# Patient Record
Sex: FEMALE | Race: WHITE | NOT HISPANIC OR LATINO | Employment: FULL TIME | URBAN - METROPOLITAN AREA
[De-identification: names, ages, dates, MRNs, and addresses within clinical notes are randomized per-mention and may not be internally consistent; named-entity substitution may affect disease eponyms.]

---

## 2023-01-05 ENCOUNTER — APPOINTMENT (OUTPATIENT)
Dept: GENERAL RADIOLOGY | Facility: HOSPITAL | Age: 63
End: 2023-01-05
Payer: COMMERCIAL

## 2023-01-05 ENCOUNTER — HOSPITAL ENCOUNTER (INPATIENT)
Facility: HOSPITAL | Age: 63
LOS: 9 days | Discharge: NURSING, REHAB OR LONG-TERM CARE | End: 2023-01-14
Attending: EMERGENCY MEDICINE | Admitting: INTERNAL MEDICINE
Payer: COMMERCIAL

## 2023-01-05 DIAGNOSIS — L03.115 CELLULITIS OF RIGHT FOOT: Primary | ICD-10-CM

## 2023-01-05 DIAGNOSIS — Z86.39 HISTORY OF DIABETES MELLITUS, TYPE II: ICD-10-CM

## 2023-01-05 PROBLEM — E11.9 TYPE 2 DIABETES MELLITUS: Status: ACTIVE | Noted: 2023-01-05

## 2023-01-05 PROBLEM — L97.519 ULCER OF RIGHT FOOT: Status: ACTIVE | Noted: 2023-01-05

## 2023-01-05 PROBLEM — K21.9 GERD (GASTROESOPHAGEAL REFLUX DISEASE): Status: ACTIVE | Noted: 2023-01-05

## 2023-01-05 PROBLEM — L40.50 PSORIATIC ARTHRITIS: Status: ACTIVE | Noted: 2023-01-05

## 2023-01-05 PROBLEM — D84.9 IMMUNOCOMPROMISED: Status: ACTIVE | Noted: 2023-01-05

## 2023-01-05 PROBLEM — A41.9 SEPSIS: Status: ACTIVE | Noted: 2023-01-05

## 2023-01-05 LAB
ALBUMIN SERPL-MCNC: 4.3 G/DL (ref 3.5–5.2)
ALBUMIN/GLOB SERPL: 1.1 G/DL
ALP SERPL-CCNC: 113 U/L (ref 39–117)
ALT SERPL W P-5'-P-CCNC: 44 U/L (ref 1–33)
ANION GAP SERPL CALCULATED.3IONS-SCNC: 13 MMOL/L (ref 5–15)
AST SERPL-CCNC: 25 U/L (ref 1–32)
BASOPHILS # BLD AUTO: 0.08 10*3/MM3 (ref 0–0.2)
BASOPHILS NFR BLD AUTO: 0.4 % (ref 0–1.5)
BILIRUB SERPL-MCNC: 1.2 MG/DL (ref 0–1.2)
BUN SERPL-MCNC: 16 MG/DL (ref 8–23)
BUN/CREAT SERPL: 17 (ref 7–25)
CALCIUM SPEC-SCNC: 9.7 MG/DL (ref 8.6–10.5)
CHLORIDE SERPL-SCNC: 95 MMOL/L (ref 98–107)
CO2 SERPL-SCNC: 27 MMOL/L (ref 22–29)
CREAT SERPL-MCNC: 0.94 MG/DL (ref 0.57–1)
CRP SERPL-MCNC: 12.67 MG/DL (ref 0–0.5)
D-LACTATE SERPL-SCNC: 1.9 MMOL/L (ref 0.5–2)
DEPRECATED RDW RBC AUTO: 46.4 FL (ref 37–54)
EGFRCR SERPLBLD CKD-EPI 2021: 69.2 ML/MIN/1.73
EOSINOPHIL # BLD AUTO: 0.01 10*3/MM3 (ref 0–0.4)
EOSINOPHIL NFR BLD AUTO: 0 % (ref 0.3–6.2)
ERYTHROCYTE [DISTWIDTH] IN BLOOD BY AUTOMATED COUNT: 13.6 % (ref 12.3–15.4)
ERYTHROCYTE [SEDIMENTATION RATE] IN BLOOD: 74 MM/HR (ref 0–30)
GLOBULIN UR ELPH-MCNC: 4 GM/DL
GLUCOSE SERPL-MCNC: 364 MG/DL (ref 65–99)
HCT VFR BLD AUTO: 39.5 % (ref 34–46.6)
HGB BLD-MCNC: 13.1 G/DL (ref 12–15.9)
HOLD SPECIMEN: NORMAL
IMM GRANULOCYTES # BLD AUTO: 0.08 10*3/MM3 (ref 0–0.05)
IMM GRANULOCYTES NFR BLD AUTO: 0.4 % (ref 0–0.5)
LYMPHOCYTES # BLD AUTO: 0.9 10*3/MM3 (ref 0.7–3.1)
LYMPHOCYTES NFR BLD AUTO: 4.5 % (ref 19.6–45.3)
MCH RBC QN AUTO: 31.3 PG (ref 26.6–33)
MCHC RBC AUTO-ENTMCNC: 33.2 G/DL (ref 31.5–35.7)
MCV RBC AUTO: 94.5 FL (ref 79–97)
MONOCYTES # BLD AUTO: 0.97 10*3/MM3 (ref 0.1–0.9)
MONOCYTES NFR BLD AUTO: 4.8 % (ref 5–12)
NEUTROPHILS NFR BLD AUTO: 17.97 10*3/MM3 (ref 1.7–7)
NEUTROPHILS NFR BLD AUTO: 89.9 % (ref 42.7–76)
NRBC BLD AUTO-RTO: 0 /100 WBC (ref 0–0.2)
PLATELET # BLD AUTO: 367 10*3/MM3 (ref 140–450)
PMV BLD AUTO: 9.4 FL (ref 6–12)
POTASSIUM SERPL-SCNC: 3.6 MMOL/L (ref 3.5–5.2)
PROCALCITONIN SERPL-MCNC: 0.35 NG/ML (ref 0–0.25)
PROT SERPL-MCNC: 8.3 G/DL (ref 6–8.5)
RBC # BLD AUTO: 4.18 10*6/MM3 (ref 3.77–5.28)
SODIUM SERPL-SCNC: 135 MMOL/L (ref 136–145)
WBC NRBC COR # BLD: 20.01 10*3/MM3 (ref 3.4–10.8)
WHOLE BLOOD HOLD COAG: NORMAL
WHOLE BLOOD HOLD SPECIMEN: NORMAL

## 2023-01-05 PROCEDURE — 86140 C-REACTIVE PROTEIN: CPT | Performed by: INTERNAL MEDICINE

## 2023-01-05 PROCEDURE — 87205 SMEAR GRAM STAIN: CPT | Performed by: NURSE PRACTITIONER

## 2023-01-05 PROCEDURE — 80053 COMPREHEN METABOLIC PANEL: CPT

## 2023-01-05 PROCEDURE — 99285 EMERGENCY DEPT VISIT HI MDM: CPT

## 2023-01-05 PROCEDURE — 83605 ASSAY OF LACTIC ACID: CPT

## 2023-01-05 PROCEDURE — 85025 COMPLETE CBC W/AUTO DIFF WBC: CPT

## 2023-01-05 PROCEDURE — 99222 1ST HOSP IP/OBS MODERATE 55: CPT | Performed by: INTERNAL MEDICINE

## 2023-01-05 PROCEDURE — 87070 CULTURE OTHR SPECIMN AEROBIC: CPT | Performed by: NURSE PRACTITIONER

## 2023-01-05 PROCEDURE — 87040 BLOOD CULTURE FOR BACTERIA: CPT | Performed by: EMERGENCY MEDICINE

## 2023-01-05 PROCEDURE — 73630 X-RAY EXAM OF FOOT: CPT

## 2023-01-05 PROCEDURE — 36415 COLL VENOUS BLD VENIPUNCTURE: CPT

## 2023-01-05 PROCEDURE — 87040 BLOOD CULTURE FOR BACTERIA: CPT

## 2023-01-05 PROCEDURE — 85652 RBC SED RATE AUTOMATED: CPT | Performed by: INTERNAL MEDICINE

## 2023-01-05 PROCEDURE — 25010000002 MEROPENEM PER 100 MG: Performed by: NURSE PRACTITIONER

## 2023-01-05 PROCEDURE — 84145 PROCALCITONIN (PCT): CPT | Performed by: INTERNAL MEDICINE

## 2023-01-05 RX ORDER — ASPIRIN 81 MG/1
81 TABLET, CHEWABLE ORAL DAILY
COMMUNITY

## 2023-01-05 RX ORDER — BETAMETHASONE DIPROPIONATE 0.5 MG/G
1 CREAM TOPICAL 2 TIMES DAILY PRN
COMMUNITY

## 2023-01-05 RX ORDER — SELENIUM SULFIDE 2.5 MG/100ML
LOTION TOPICAL DAILY PRN
COMMUNITY

## 2023-01-05 RX ORDER — FOLIC ACID 1 MG/1
0.5 TABLET ORAL DAILY
COMMUNITY

## 2023-01-05 RX ORDER — ZINC 25 MG
TABLET ORAL
COMMUNITY

## 2023-01-05 RX ORDER — DEXLANSOPRAZOLE 60 MG/1
60 CAPSULE, DELAYED RELEASE ORAL DAILY
COMMUNITY

## 2023-01-05 RX ORDER — PHENOL 1.4 %
600 AEROSOL, SPRAY (ML) MUCOUS MEMBRANE DAILY
COMMUNITY

## 2023-01-05 RX ORDER — POTASSIUM CITRATE 10 MEQ/1
10 TABLET, EXTENDED RELEASE ORAL
COMMUNITY

## 2023-01-05 RX ORDER — NYSTATIN 100000 [USP'U]/G
POWDER TOPICAL 4 TIMES DAILY
COMMUNITY

## 2023-01-05 RX ORDER — GLUCOSAMINE/D3/BOSWELLIA SERRA 1500MG-400
TABLET ORAL
COMMUNITY

## 2023-01-05 RX ORDER — SODIUM CHLORIDE 0.9 % (FLUSH) 0.9 %
10 SYRINGE (ML) INJECTION AS NEEDED
Status: DISCONTINUED | OUTPATIENT
Start: 2023-01-05 | End: 2023-01-06

## 2023-01-05 RX ORDER — ATORVASTATIN CALCIUM 10 MG/1
10 TABLET, FILM COATED ORAL DAILY
COMMUNITY

## 2023-01-05 RX ORDER — LORATADINE 10 MG/1
CAPSULE, LIQUID FILLED ORAL
COMMUNITY

## 2023-01-05 RX ORDER — HYDROCHLOROTHIAZIDE 25 MG/1
50 TABLET ORAL DAILY
COMMUNITY

## 2023-01-05 RX ORDER — MULTIVIT WITH MINERALS/LUTEIN
500 TABLET ORAL DAILY
COMMUNITY

## 2023-01-05 RX ORDER — ERGOCALCIFEROL 1.25 MG/1
50000 CAPSULE ORAL WEEKLY
COMMUNITY

## 2023-01-05 RX ORDER — VIT C/B6/B5/MAGNESIUM/HERB 173 50-5-6-5MG
1000 CAPSULE ORAL
COMMUNITY

## 2023-01-05 RX ADMIN — SODIUM CHLORIDE 1000 ML: 9 INJECTION, SOLUTION INTRAVENOUS at 21:55

## 2023-01-05 RX ADMIN — MEROPENEM 1 G: 1 INJECTION, POWDER, FOR SOLUTION INTRAVENOUS at 21:53

## 2023-01-06 ENCOUNTER — APPOINTMENT (OUTPATIENT)
Dept: MRI IMAGING | Facility: HOSPITAL | Age: 63
End: 2023-01-06
Payer: COMMERCIAL

## 2023-01-06 ENCOUNTER — ANESTHESIA EVENT (OUTPATIENT)
Dept: PERIOP | Facility: HOSPITAL | Age: 63
End: 2023-01-06
Payer: COMMERCIAL

## 2023-01-06 ENCOUNTER — ANESTHESIA (OUTPATIENT)
Dept: PERIOP | Facility: HOSPITAL | Age: 63
End: 2023-01-06
Payer: COMMERCIAL

## 2023-01-06 PROBLEM — L03.115 CELLULITIS OF RIGHT FOOT: Status: ACTIVE | Noted: 2023-01-05

## 2023-01-06 LAB
ABO GROUP BLD: NORMAL
ABO GROUP BLD: NORMAL
ANION GAP SERPL CALCULATED.3IONS-SCNC: 11 MMOL/L (ref 5–15)
BASOPHILS # BLD AUTO: 0.07 10*3/MM3 (ref 0–0.2)
BASOPHILS NFR BLD AUTO: 0.4 % (ref 0–1.5)
BLD GP AB SCN SERPL QL: NEGATIVE
BUN SERPL-MCNC: 14 MG/DL (ref 8–23)
BUN/CREAT SERPL: 18.2 (ref 7–25)
CALCIUM SPEC-SCNC: 9.3 MG/DL (ref 8.6–10.5)
CHLORIDE SERPL-SCNC: 99 MMOL/L (ref 98–107)
CK SERPL-CCNC: 99 U/L (ref 20–180)
CO2 SERPL-SCNC: 27 MMOL/L (ref 22–29)
CREAT SERPL-MCNC: 0.77 MG/DL (ref 0.57–1)
DEPRECATED RDW RBC AUTO: 46.1 FL (ref 37–54)
EGFRCR SERPLBLD CKD-EPI 2021: 87.9 ML/MIN/1.73
EOSINOPHIL # BLD AUTO: 0.03 10*3/MM3 (ref 0–0.4)
EOSINOPHIL NFR BLD AUTO: 0.2 % (ref 0.3–6.2)
ERYTHROCYTE [DISTWIDTH] IN BLOOD BY AUTOMATED COUNT: 13.5 % (ref 12.3–15.4)
GLUCOSE BLDC GLUCOMTR-MCNC: 150 MG/DL (ref 70–130)
GLUCOSE BLDC GLUCOMTR-MCNC: 152 MG/DL (ref 70–130)
GLUCOSE BLDC GLUCOMTR-MCNC: 162 MG/DL (ref 70–130)
GLUCOSE BLDC GLUCOMTR-MCNC: 187 MG/DL (ref 70–130)
GLUCOSE BLDC GLUCOMTR-MCNC: 278 MG/DL (ref 70–130)
GLUCOSE SERPL-MCNC: 189 MG/DL (ref 65–99)
HBA1C MFR BLD: 8.2 % (ref 4.8–5.6)
HCT VFR BLD AUTO: 34.2 % (ref 34–46.6)
HGB BLD-MCNC: 11.5 G/DL (ref 12–15.9)
IMM GRANULOCYTES # BLD AUTO: 0.12 10*3/MM3 (ref 0–0.05)
IMM GRANULOCYTES NFR BLD AUTO: 0.7 % (ref 0–0.5)
LYMPHOCYTES # BLD AUTO: 1.05 10*3/MM3 (ref 0.7–3.1)
LYMPHOCYTES NFR BLD AUTO: 6.5 % (ref 19.6–45.3)
MAGNESIUM SERPL-MCNC: 1.9 MG/DL (ref 1.6–2.4)
MCH RBC QN AUTO: 31.6 PG (ref 26.6–33)
MCHC RBC AUTO-ENTMCNC: 33.6 G/DL (ref 31.5–35.7)
MCV RBC AUTO: 94 FL (ref 79–97)
MONOCYTES # BLD AUTO: 1.2 10*3/MM3 (ref 0.1–0.9)
MONOCYTES NFR BLD AUTO: 7.4 % (ref 5–12)
MRSA DNA SPEC QL NAA+PROBE: POSITIVE
NEUTROPHILS NFR BLD AUTO: 13.72 10*3/MM3 (ref 1.7–7)
NEUTROPHILS NFR BLD AUTO: 84.8 % (ref 42.7–76)
NRBC BLD AUTO-RTO: 0 /100 WBC (ref 0–0.2)
PLATELET # BLD AUTO: 287 10*3/MM3 (ref 140–450)
PMV BLD AUTO: 9.6 FL (ref 6–12)
POTASSIUM SERPL-SCNC: 3.1 MMOL/L (ref 3.5–5.2)
RBC # BLD AUTO: 3.64 10*6/MM3 (ref 3.77–5.28)
RH BLD: POSITIVE
RH BLD: POSITIVE
SODIUM SERPL-SCNC: 137 MMOL/L (ref 136–145)
T&S EXPIRATION DATE: NORMAL
WBC NRBC COR # BLD: 16.19 10*3/MM3 (ref 3.4–10.8)

## 2023-01-06 PROCEDURE — 0 GADOBENATE DIMEGLUMINE 529 MG/ML SOLUTION: Performed by: INTERNAL MEDICINE

## 2023-01-06 PROCEDURE — 87176 TISSUE HOMOGENIZATION CULTR: CPT | Performed by: THORACIC SURGERY (CARDIOTHORACIC VASCULAR SURGERY)

## 2023-01-06 PROCEDURE — 25010000002 DAPTOMYCIN PER 1 MG: Performed by: INTERNAL MEDICINE

## 2023-01-06 PROCEDURE — 83735 ASSAY OF MAGNESIUM: CPT

## 2023-01-06 PROCEDURE — 80048 BASIC METABOLIC PNL TOTAL CA: CPT | Performed by: NURSE PRACTITIONER

## 2023-01-06 PROCEDURE — 87075 CULTR BACTERIA EXCEPT BLOOD: CPT | Performed by: THORACIC SURGERY (CARDIOTHORACIC VASCULAR SURGERY)

## 2023-01-06 PROCEDURE — 99222 1ST HOSP IP/OBS MODERATE 55: CPT | Performed by: THORACIC SURGERY (CARDIOTHORACIC VASCULAR SURGERY)

## 2023-01-06 PROCEDURE — 86901 BLOOD TYPING SEROLOGIC RH(D): CPT | Performed by: PHYSICIAN ASSISTANT

## 2023-01-06 PROCEDURE — 25010000002 PIPERACILLIN SOD-TAZOBACTAM PER 1 G: Performed by: INTERNAL MEDICINE

## 2023-01-06 PROCEDURE — 86901 BLOOD TYPING SEROLOGIC RH(D): CPT

## 2023-01-06 PROCEDURE — 0JBQ0ZZ EXCISION OF RIGHT FOOT SUBCUTANEOUS TISSUE AND FASCIA, OPEN APPROACH: ICD-10-PCS | Performed by: THORACIC SURGERY (CARDIOTHORACIC VASCULAR SURGERY)

## 2023-01-06 PROCEDURE — 87641 MR-STAPH DNA AMP PROBE: CPT

## 2023-01-06 PROCEDURE — 82550 ASSAY OF CK (CPK): CPT | Performed by: INTERNAL MEDICINE

## 2023-01-06 PROCEDURE — 86900 BLOOD TYPING SEROLOGIC ABO: CPT | Performed by: PHYSICIAN ASSISTANT

## 2023-01-06 PROCEDURE — A9577 INJ MULTIHANCE: HCPCS | Performed by: INTERNAL MEDICINE

## 2023-01-06 PROCEDURE — 88305 TISSUE EXAM BY PATHOLOGIST: CPT | Performed by: THORACIC SURGERY (CARDIOTHORACIC VASCULAR SURGERY)

## 2023-01-06 PROCEDURE — 28810 AMPUTATION TOE & METATARSAL: CPT | Performed by: PHYSICIAN ASSISTANT

## 2023-01-06 PROCEDURE — 82962 GLUCOSE BLOOD TEST: CPT

## 2023-01-06 PROCEDURE — 94799 UNLISTED PULMONARY SVC/PX: CPT

## 2023-01-06 PROCEDURE — 88311 DECALCIFY TISSUE: CPT | Performed by: THORACIC SURGERY (CARDIOTHORACIC VASCULAR SURGERY)

## 2023-01-06 PROCEDURE — 86900 BLOOD TYPING SEROLOGIC ABO: CPT

## 2023-01-06 PROCEDURE — 87205 SMEAR GRAM STAIN: CPT | Performed by: THORACIC SURGERY (CARDIOTHORACIC VASCULAR SURGERY)

## 2023-01-06 PROCEDURE — 63710000001 INSULIN LISPRO (HUMAN) PER 5 UNITS

## 2023-01-06 PROCEDURE — 85025 COMPLETE CBC W/AUTO DIFF WBC: CPT | Performed by: NURSE PRACTITIONER

## 2023-01-06 PROCEDURE — 86923 COMPATIBILITY TEST ELECTRIC: CPT

## 2023-01-06 PROCEDURE — 25010000002 ONDANSETRON PER 1 MG: Performed by: NURSE ANESTHETIST, CERTIFIED REGISTERED

## 2023-01-06 PROCEDURE — 83036 HEMOGLOBIN GLYCOSYLATED A1C: CPT | Performed by: NURSE PRACTITIONER

## 2023-01-06 PROCEDURE — 86850 RBC ANTIBODY SCREEN: CPT | Performed by: PHYSICIAN ASSISTANT

## 2023-01-06 PROCEDURE — 25010000002 MEROPENEM PER 100 MG: Performed by: NURSE PRACTITIONER

## 2023-01-06 PROCEDURE — 87070 CULTURE OTHR SPECIMN AEROBIC: CPT | Performed by: THORACIC SURGERY (CARDIOTHORACIC VASCULAR SURGERY)

## 2023-01-06 PROCEDURE — 87186 SC STD MICRODIL/AGAR DIL: CPT | Performed by: THORACIC SURGERY (CARDIOTHORACIC VASCULAR SURGERY)

## 2023-01-06 PROCEDURE — 0Y6M0ZB DETACHMENT AT RIGHT FOOT, PARTIAL 2ND RAY, OPEN APPROACH: ICD-10-PCS | Performed by: THORACIC SURGERY (CARDIOTHORACIC VASCULAR SURGERY)

## 2023-01-06 PROCEDURE — 99233 SBSQ HOSP IP/OBS HIGH 50: CPT | Performed by: INTERNAL MEDICINE

## 2023-01-06 PROCEDURE — 25010000002 PROPOFOL 10 MG/ML EMULSION: Performed by: NURSE ANESTHETIST, CERTIFIED REGISTERED

## 2023-01-06 PROCEDURE — 88304 TISSUE EXAM BY PATHOLOGIST: CPT | Performed by: THORACIC SURGERY (CARDIOTHORACIC VASCULAR SURGERY)

## 2023-01-06 PROCEDURE — 63710000001 INSULIN LISPRO (HUMAN) PER 5 UNITS: Performed by: NURSE PRACTITIONER

## 2023-01-06 PROCEDURE — 25010000002 VANCOMYCIN 10 G RECONSTITUTED SOLUTION: Performed by: NURSE PRACTITIONER

## 2023-01-06 PROCEDURE — 25010000002 FENTANYL CITRATE (PF) 100 MCG/2ML SOLUTION: Performed by: NURSE ANESTHETIST, CERTIFIED REGISTERED

## 2023-01-06 PROCEDURE — 73720 MRI LWR EXTREMITY W/O&W/DYE: CPT

## 2023-01-06 PROCEDURE — 94660 CPAP INITIATION&MGMT: CPT

## 2023-01-06 PROCEDURE — 28810 AMPUTATION TOE & METATARSAL: CPT | Performed by: THORACIC SURGERY (CARDIOTHORACIC VASCULAR SURGERY)

## 2023-01-06 RX ORDER — SODIUM CHLORIDE 0.9 % (FLUSH) 0.9 %
10 SYRINGE (ML) INJECTION EVERY 12 HOURS SCHEDULED
Status: DISCONTINUED | OUTPATIENT
Start: 2023-01-06 | End: 2023-01-14 | Stop reason: HOSPADM

## 2023-01-06 RX ORDER — NALOXONE HCL 0.4 MG/ML
0.4 VIAL (ML) INJECTION
Status: DISCONTINUED | OUTPATIENT
Start: 2023-01-06 | End: 2023-01-14 | Stop reason: HOSPADM

## 2023-01-06 RX ORDER — SODIUM CHLORIDE 0.9 % (FLUSH) 0.9 %
10 SYRINGE (ML) INJECTION AS NEEDED
Status: DISCONTINUED | OUTPATIENT
Start: 2023-01-06 | End: 2023-01-14 | Stop reason: HOSPADM

## 2023-01-06 RX ORDER — LIDOCAINE HYDROCHLORIDE 10 MG/ML
INJECTION, SOLUTION EPIDURAL; INFILTRATION; INTRACAUDAL; PERINEURAL AS NEEDED
Status: DISCONTINUED | OUTPATIENT
Start: 2023-01-06 | End: 2023-01-06 | Stop reason: SURG

## 2023-01-06 RX ORDER — FENTANYL CITRATE 50 UG/ML
50 INJECTION, SOLUTION INTRAMUSCULAR; INTRAVENOUS
Status: DISCONTINUED | OUTPATIENT
Start: 2023-01-06 | End: 2023-01-06 | Stop reason: HOSPADM

## 2023-01-06 RX ORDER — SODIUM CHLORIDE, SODIUM LACTATE, POTASSIUM CHLORIDE, CALCIUM CHLORIDE 600; 310; 30; 20 MG/100ML; MG/100ML; MG/100ML; MG/100ML
9 INJECTION, SOLUTION INTRAVENOUS CONTINUOUS
Status: DISCONTINUED | OUTPATIENT
Start: 2023-01-06 | End: 2023-01-09

## 2023-01-06 RX ORDER — PROPOFOL 10 MG/ML
VIAL (ML) INTRAVENOUS AS NEEDED
Status: DISCONTINUED | OUTPATIENT
Start: 2023-01-06 | End: 2023-01-06 | Stop reason: SURG

## 2023-01-06 RX ORDER — POTASSIUM CHLORIDE 1.5 G/1.77G
40 POWDER, FOR SOLUTION ORAL AS NEEDED
Status: DISCONTINUED | OUTPATIENT
Start: 2023-01-06 | End: 2023-01-14 | Stop reason: HOSPADM

## 2023-01-06 RX ORDER — FOLIC ACID 1 MG/1
0.5 TABLET ORAL DAILY
Status: DISCONTINUED | OUTPATIENT
Start: 2023-01-06 | End: 2023-01-14 | Stop reason: HOSPADM

## 2023-01-06 RX ORDER — DEXTROSE MONOHYDRATE 25 G/50ML
25 INJECTION, SOLUTION INTRAVENOUS
Status: DISCONTINUED | OUTPATIENT
Start: 2023-01-06 | End: 2023-01-14 | Stop reason: HOSPADM

## 2023-01-06 RX ORDER — ACETAMINOPHEN 325 MG/1
650 TABLET ORAL EVERY 4 HOURS PRN
Status: DISCONTINUED | OUTPATIENT
Start: 2023-01-06 | End: 2023-01-14 | Stop reason: HOSPADM

## 2023-01-06 RX ORDER — MORPHINE SULFATE 2 MG/ML
1 INJECTION, SOLUTION INTRAMUSCULAR; INTRAVENOUS EVERY 4 HOURS PRN
Status: ACTIVE | OUTPATIENT
Start: 2023-01-06 | End: 2023-01-13

## 2023-01-06 RX ORDER — FAMOTIDINE 20 MG/1
20 TABLET, FILM COATED ORAL ONCE
Status: COMPLETED | OUTPATIENT
Start: 2023-01-06 | End: 2023-01-06

## 2023-01-06 RX ORDER — NICOTINE POLACRILEX 4 MG
15 LOZENGE BUCCAL
Status: DISCONTINUED | OUTPATIENT
Start: 2023-01-06 | End: 2023-01-14 | Stop reason: HOSPADM

## 2023-01-06 RX ORDER — POTASSIUM CHLORIDE 7.45 MG/ML
10 INJECTION INTRAVENOUS
Status: DISCONTINUED | OUTPATIENT
Start: 2023-01-06 | End: 2023-01-14 | Stop reason: HOSPADM

## 2023-01-06 RX ORDER — PANTOPRAZOLE SODIUM 40 MG/1
40 TABLET, DELAYED RELEASE ORAL
Status: DISCONTINUED | OUTPATIENT
Start: 2023-01-07 | End: 2023-01-14 | Stop reason: HOSPADM

## 2023-01-06 RX ORDER — NYSTATIN 100000 [USP'U]/G
POWDER TOPICAL EVERY 8 HOURS SCHEDULED
Status: DISCONTINUED | OUTPATIENT
Start: 2023-01-06 | End: 2023-01-14 | Stop reason: HOSPADM

## 2023-01-06 RX ORDER — FENTANYL CITRATE 50 UG/ML
INJECTION, SOLUTION INTRAMUSCULAR; INTRAVENOUS AS NEEDED
Status: DISCONTINUED | OUTPATIENT
Start: 2023-01-06 | End: 2023-01-06 | Stop reason: SURG

## 2023-01-06 RX ORDER — INSULIN LISPRO 100 [IU]/ML
0-7 INJECTION, SOLUTION INTRAVENOUS; SUBCUTANEOUS
Status: DISCONTINUED | OUTPATIENT
Start: 2023-01-06 | End: 2023-01-07

## 2023-01-06 RX ORDER — HYDROCODONE BITARTRATE AND ACETAMINOPHEN 7.5; 325 MG/1; MG/1
1 TABLET ORAL EVERY 4 HOURS PRN
Status: ACTIVE | OUTPATIENT
Start: 2023-01-06 | End: 2023-01-13

## 2023-01-06 RX ORDER — SODIUM CHLORIDE 9 MG/ML
40 INJECTION, SOLUTION INTRAVENOUS AS NEEDED
Status: DISCONTINUED | OUTPATIENT
Start: 2023-01-06 | End: 2023-01-14 | Stop reason: HOSPADM

## 2023-01-06 RX ORDER — ACETAMINOPHEN 160 MG/5ML
650 SOLUTION ORAL EVERY 4 HOURS PRN
Status: DISCONTINUED | OUTPATIENT
Start: 2023-01-06 | End: 2023-01-14 | Stop reason: HOSPADM

## 2023-01-06 RX ORDER — HYDROCHLOROTHIAZIDE 25 MG/1
50 TABLET ORAL DAILY
Status: DISCONTINUED | OUTPATIENT
Start: 2023-01-06 | End: 2023-01-14 | Stop reason: HOSPADM

## 2023-01-06 RX ORDER — ONDANSETRON 2 MG/ML
INJECTION INTRAMUSCULAR; INTRAVENOUS AS NEEDED
Status: DISCONTINUED | OUTPATIENT
Start: 2023-01-06 | End: 2023-01-06 | Stop reason: SURG

## 2023-01-06 RX ORDER — HEPARIN SODIUM 5000 [USP'U]/ML
5000 INJECTION, SOLUTION INTRAVENOUS; SUBCUTANEOUS EVERY 12 HOURS SCHEDULED
Status: DISCONTINUED | OUTPATIENT
Start: 2023-01-07 | End: 2023-01-14 | Stop reason: HOSPADM

## 2023-01-06 RX ORDER — GINGER ROOT/GINGER ROOT EXT 262.5 MG
1 CAPSULE ORAL DAILY
Status: DISCONTINUED | OUTPATIENT
Start: 2023-01-06 | End: 2023-01-14 | Stop reason: HOSPADM

## 2023-01-06 RX ORDER — ACETAMINOPHEN 650 MG/1
650 SUPPOSITORY RECTAL EVERY 4 HOURS PRN
Status: DISCONTINUED | OUTPATIENT
Start: 2023-01-06 | End: 2023-01-14 | Stop reason: HOSPADM

## 2023-01-06 RX ORDER — HYDROMORPHONE HYDROCHLORIDE 1 MG/ML
0.5 INJECTION, SOLUTION INTRAMUSCULAR; INTRAVENOUS; SUBCUTANEOUS
Status: DISCONTINUED | OUTPATIENT
Start: 2023-01-06 | End: 2023-01-06 | Stop reason: HOSPADM

## 2023-01-06 RX ORDER — PANTOPRAZOLE SODIUM 40 MG/1
40 TABLET, DELAYED RELEASE ORAL EVERY MORNING
Status: DISCONTINUED | OUTPATIENT
Start: 2023-01-06 | End: 2023-01-06

## 2023-01-06 RX ORDER — SODIUM CHLORIDE 450 MG/100ML
50 INJECTION, SOLUTION INTRAVENOUS CONTINUOUS
Status: DISCONTINUED | OUTPATIENT
Start: 2023-01-06 | End: 2023-01-12

## 2023-01-06 RX ORDER — ASPIRIN 81 MG/1
81 TABLET, CHEWABLE ORAL DAILY
Status: DISCONTINUED | OUTPATIENT
Start: 2023-01-06 | End: 2023-01-14 | Stop reason: HOSPADM

## 2023-01-06 RX ORDER — ONDANSETRON 2 MG/ML
4 INJECTION INTRAMUSCULAR; INTRAVENOUS EVERY 6 HOURS PRN
Status: DISCONTINUED | OUTPATIENT
Start: 2023-01-06 | End: 2023-01-14 | Stop reason: HOSPADM

## 2023-01-06 RX ORDER — ACETAMINOPHEN 325 MG/1
650 TABLET ORAL EVERY 4 HOURS PRN
Status: DISCONTINUED | OUTPATIENT
Start: 2023-01-06 | End: 2023-01-06

## 2023-01-06 RX ORDER — SODIUM CHLORIDE, SODIUM LACTATE, POTASSIUM CHLORIDE, CALCIUM CHLORIDE 600; 310; 30; 20 MG/100ML; MG/100ML; MG/100ML; MG/100ML
INJECTION, SOLUTION INTRAVENOUS CONTINUOUS PRN
Status: DISCONTINUED | OUTPATIENT
Start: 2023-01-06 | End: 2023-01-06 | Stop reason: SURG

## 2023-01-06 RX ORDER — BETAMETHASONE DIPROPIONATE 0.5 MG/G
1 CREAM TOPICAL 2 TIMES DAILY
Status: DISCONTINUED | OUTPATIENT
Start: 2023-01-06 | End: 2023-01-10

## 2023-01-06 RX ORDER — POTASSIUM CHLORIDE 750 MG/1
40 CAPSULE, EXTENDED RELEASE ORAL AS NEEDED
Status: DISCONTINUED | OUTPATIENT
Start: 2023-01-06 | End: 2023-01-14 | Stop reason: HOSPADM

## 2023-01-06 RX ORDER — PANTOPRAZOLE SODIUM 40 MG/1
40 TABLET, DELAYED RELEASE ORAL DAILY
Status: DISCONTINUED | OUTPATIENT
Start: 2023-01-06 | End: 2023-01-06

## 2023-01-06 RX ORDER — ATORVASTATIN CALCIUM 10 MG/1
10 TABLET, FILM COATED ORAL DAILY
Status: DISCONTINUED | OUTPATIENT
Start: 2023-01-06 | End: 2023-01-14 | Stop reason: HOSPADM

## 2023-01-06 RX ORDER — INSULIN LISPRO 100 [IU]/ML
INJECTION, SOLUTION INTRAVENOUS; SUBCUTANEOUS
Status: COMPLETED
Start: 2023-01-06 | End: 2023-01-06

## 2023-01-06 RX ORDER — ONDANSETRON 4 MG/1
4 TABLET, FILM COATED ORAL EVERY 6 HOURS PRN
Status: DISCONTINUED | OUTPATIENT
Start: 2023-01-06 | End: 2023-01-14 | Stop reason: HOSPADM

## 2023-01-06 RX ORDER — CETIRIZINE HYDROCHLORIDE 10 MG/1
5 TABLET ORAL DAILY
Status: DISCONTINUED | OUTPATIENT
Start: 2023-01-06 | End: 2023-01-14 | Stop reason: HOSPADM

## 2023-01-06 RX ADMIN — FENTANYL CITRATE 25 MCG: 50 INJECTION INTRAMUSCULAR; INTRAVENOUS at 17:25

## 2023-01-06 RX ADMIN — FENTANYL CITRATE 75 MCG: 50 INJECTION INTRAMUSCULAR; INTRAVENOUS at 17:32

## 2023-01-06 RX ADMIN — SODIUM CHLORIDE, POTASSIUM CHLORIDE, SODIUM LACTATE AND CALCIUM CHLORIDE: 600; 310; 30; 20 INJECTION, SOLUTION INTRAVENOUS at 17:17

## 2023-01-06 RX ADMIN — ACETAMINOPHEN 650 MG: 325 TABLET ORAL at 20:24

## 2023-01-06 RX ADMIN — ONDANSETRON 4 MG: 2 INJECTION INTRAMUSCULAR; INTRAVENOUS at 17:58

## 2023-01-06 RX ADMIN — SODIUM CHLORIDE 50 ML/HR: 4.5 INJECTION, SOLUTION INTRAVENOUS at 22:34

## 2023-01-06 RX ADMIN — POTASSIUM CHLORIDE 40 MEQ: 750 CAPSULE, EXTENDED RELEASE ORAL at 09:21

## 2023-01-06 RX ADMIN — FENTANYL CITRATE 100 MCG: 50 INJECTION INTRAMUSCULAR; INTRAVENOUS at 17:47

## 2023-01-06 RX ADMIN — INSULIN LISPRO 2 UNITS: 100 INJECTION, SOLUTION INTRAVENOUS; SUBCUTANEOUS at 09:37

## 2023-01-06 RX ADMIN — MEROPENEM 1 G: 1 INJECTION, POWDER, FOR SOLUTION INTRAVENOUS at 03:13

## 2023-01-06 RX ADMIN — Medication 10 ML: at 00:34

## 2023-01-06 RX ADMIN — NYSTATIN: 100000 POWDER TOPICAL at 20:26

## 2023-01-06 RX ADMIN — GADOBENATE DIMEGLUMINE 20 ML: 529 INJECTION, SOLUTION INTRAVENOUS at 02:37

## 2023-01-06 RX ADMIN — INSULIN LISPRO 2 UNITS: 100 INJECTION, SOLUTION INTRAVENOUS; SUBCUTANEOUS at 12:55

## 2023-01-06 RX ADMIN — Medication 10 ML: at 20:23

## 2023-01-06 RX ADMIN — FAMOTIDINE 20 MG: 20 TABLET, FILM COATED ORAL at 14:15

## 2023-01-06 RX ADMIN — POTASSIUM CHLORIDE 40 MEQ: 750 CAPSULE, EXTENDED RELEASE ORAL at 20:23

## 2023-01-06 RX ADMIN — DAPTOMYCIN 500 MG: 500 INJECTION, POWDER, LYOPHILIZED, FOR SOLUTION INTRAVENOUS at 12:55

## 2023-01-06 RX ADMIN — TAZOBACTAM SODIUM AND PIPERACILLIN SODIUM 3.38 G: 375; 3 INJECTION, SOLUTION INTRAVENOUS at 11:31

## 2023-01-06 RX ADMIN — PROPOFOL 150 MG: 10 INJECTION, EMULSION INTRAVENOUS at 17:17

## 2023-01-06 RX ADMIN — SODIUM CHLORIDE, POTASSIUM CHLORIDE, SODIUM LACTATE AND CALCIUM CHLORIDE 9 ML/HR: 600; 310; 30; 20 INJECTION, SOLUTION INTRAVENOUS at 14:00

## 2023-01-06 RX ADMIN — VANCOMYCIN HYDROCHLORIDE 2250 MG: 10 INJECTION, POWDER, LYOPHILIZED, FOR SOLUTION INTRAVENOUS at 00:33

## 2023-01-06 RX ADMIN — BETAMETHASONE DIPROPIONATE 1 APPLICATION: 0.5 CREAM TOPICAL at 20:56

## 2023-01-06 RX ADMIN — TAZOBACTAM SODIUM AND PIPERACILLIN SODIUM 3.38 G: 375; 3 INJECTION, SOLUTION INTRAVENOUS at 20:55

## 2023-01-06 RX ADMIN — INSULIN LISPRO 4 UNITS: 100 INJECTION, SOLUTION INTRAVENOUS; SUBCUTANEOUS at 22:33

## 2023-01-06 RX ADMIN — LIDOCAINE HYDROCHLORIDE 50 MG: 10 INJECTION, SOLUTION EPIDURAL; INFILTRATION; INTRACAUDAL; PERINEURAL at 17:17

## 2023-01-06 RX ADMIN — INSULIN LISPRO 2 UNITS: 100 INJECTION, SOLUTION INTRAVENOUS; SUBCUTANEOUS at 18:58

## 2023-01-06 NOTE — PAYOR COMM NOTE
"Fatoumata Lynn, RADHA  Utilization Management  P:[phone]  F:[phone]    Ref # [id number]    Casa Nguyễn (62 y.o. Female)     Date of Birth   1960    Social Security Number       Address   [address] [place], [place] [address]    Home Phone   [phone]    MRN   [id number]       Religious   Shinto    Marital Status                               Admission Date   23    Admission Type   Emergency    Admitting Provider   Jazmin Cotton MD    Attending Provider   Jazmin Cotton MD    Department, Room/Bed   [facility] 5G, S551/1       Discharge Date       Discharge Disposition       Discharge Destination                               Attending Provider: Jazmin Cotton MD    Allergies: Ciprofloxacin, Clindamycin/lincomycin, Doxycycline, Nsaids, Sulfa Antibiotics, Tetracyclines & Related    Isolation: None   Infection: None   Code Status: CPR    Ht: 167.6 cm (66\")   Wt: 116 kg (255 lb)    Admission Cmt: None   Principal Problem: Sepsis (HCC) [A41.9]                 Active Insurance as of 2023     Primary Coverage     Payor Plan Insurance Group Employer/Plan Group    [employer] BLUE [employer]OSS AN[employer] BLUE CROSS BLUE SHIELD PPO [id number]     Payor Plan Address Payor Plan Phone Number Payor Plan Fax Number Effective Dates    [id number] [phone]  2023 - None Entered    [place] [address]       Subscriber Name Subscriber Birth Date Member ID       CASA NGUYỄN 1960 [id number]                 Emergency Contacts      (Rel.) Home Phone Work Phone Mobile Phone    Ashwinlawrence-Ivonne Edwards (Daughter) -- -- [phone]    Dereje Nguyễn (Spouse) -- -- [phone]               History & Physical      Yareli Griffin DO at 23 2304              [facility] Medicine Services  HISTORY AND PHYSICAL    Patient Name: Casa Nguyễn  : 1960  MRN: [id number]  Primary Care Physician: Emanuel Fuentes DPM  Date of admission: " 1/5/2023    Subjective    Subjective     Chief Complaint:  Right foot wound     HPI:  Maura Leon is a 62 y.o. female with a past medical history significant for diabetes mellitus type 2, psoriatic arthritis, hyperlipidemia and GERD presents to the ED with complaints of a right foot ulcer that she noticed yesterday.  Patient states that she tries to be consistent with checking her feet every other day for wounds.  Yesterday she noticed when she went to take her sock off it stuck to her foot.  She then got a mirror to look at the bottom of her right foot and noticed the ulceration.   She was evaluated by her podiatrist this morning and was sent to the ED for further evaluation.  She reports increased swelling, redness and warmth over the past 24 hours.  Patient denies any known injury, cough, shortness of air, nausea, vomiting, diarrhea or chest pain.  She does report low grade temp of 99 and body aches.  Xray of right foot obtained tonight shows soft tissue swelling throughout the forefoot and midfoot.  The findings suggest changes of soft tissue cellulitis.  There is suggestion of gas production in the soft tissue of the residual first digit and extending towards the base of the second digit.  Patient will be admitted to [facility] under the care of the Hospitalist for further evaluation and treatment.         Review of Systems   Constitutional: Positive for fever. Negative for appetite change, chills, diaphoresis, fatigue and unexpected weight change.   HENT: Negative.    Eyes: Negative for photophobia and visual disturbance.   Respiratory: Negative for cough, chest tightness and shortness of breath.    Cardiovascular: Negative for chest pain, palpitations and leg swelling.   Gastrointestinal: Negative for abdominal distention, abdominal pain, constipation, diarrhea, nausea and vomiting.   Genitourinary: Negative.    Musculoskeletal: Positive for myalgias. Negative for neck pain and neck stiffness.   Skin: Positive for  color change and wound.   Neurological: Negative for dizziness, speech difficulty, weakness, light-headedness, numbness and headaches.   Psychiatric/Behavioral: Negative.         All other systems reviewed and are negative.     Personal History     No past medical history on file.          No past surgical history on file.    Family History:  family history is not on file. Otherwise pertinent FHx was reviewed and unremarkable.     Social History:    Social History     Social History Narrative   • Not on file       Medications:  Biotin, Insulin Degludec, Loratadine, Minoxidil, Mometasone Furoate, Semaglutide(0.25 or 0.5MG/DOS), Turmeric, Zinc, aspirin, atorvastatin, betamethasone (augmented), calcium carbonate, dexlansoprazole, fluocinolone, folic acid, hydroCHLOROthiazide, metFORMIN, nystatin, potassium citrate, selenium sulfide, vitamin C, vitamin D, and vitamin D3    Allergies   Allergen Reactions   • Ciprofloxacin Provider Review Needed   • Doxycycline Provider Review Needed   • Nsaids Provider Review Needed   • Sulfa Antibiotics Provider Review Needed   • Tetracyclines & Related Provider Review Needed       Objective    Objective     Vital Signs:   Temp:  [99.7 °F (37.6 °C)] 99.7 °F (37.6 °C)  Heart Rate:  [] 90  Resp:  [16-20] 18  BP: (103-174)/(51-83) 105/59    Physical Exam  Vitals and nursing note reviewed.   Constitutional:       General: She is not in acute distress.     Appearance: Normal appearance. She is not ill-appearing, toxic-appearing or diaphoretic.   HENT:      Head: Normocephalic and atraumatic.      Nose: Nose normal.      Mouth/Throat:      Mouth: Mucous membranes are dry.      Pharynx: Oropharynx is clear.   Eyes:      Extraocular Movements: Extraocular movements intact.      Conjunctiva/sclera: Conjunctivae normal.      Pupils: Pupils are equal, round, and reactive to light.   Cardiovascular:      Rate and Rhythm: Normal rate and regular rhythm.      Pulses: Normal pulses.      Heart  sounds: Normal heart sounds.   Pulmonary:      Effort: Pulmonary effort is normal.      Breath sounds: Normal breath sounds.   Abdominal:      General: Bowel sounds are normal. There is no distension.      Palpations: Abdomen is soft. There is no mass.      Tenderness: There is no abdominal tenderness. There is no right CVA tenderness, left CVA tenderness, guarding or rebound.      Hernia: No hernia is present.   Musculoskeletal:         General: No swelling, tenderness, deformity or signs of injury. Normal range of motion.      Cervical back: Normal range of motion and neck supple.      Right lower leg: No edema.      Left lower leg: No edema.   Skin:     General: Skin is warm.      Comments: On the plantar surface of the right foot ulcer noted with a depth of approximately 0.5 cm with local erythema.  The distal half of her foot is warm with soft tissue swelling.    Neurological:      General: No focal deficit present.      Mental Status: She is alert and oriented to person, place, and time. Mental status is at baseline.   Psychiatric:         Mood and Affect: Mood normal.         Behavior: Behavior normal.         Thought Content: Thought content normal.         Judgment: Judgment normal.            Result Review:  I have personally reviewed the results from the time of this admission to 1/5/2023 23:04 EST and agree with these findings:  [x]  Laboratory list / accordion  []  Microbiology  [x]  Radiology  []  EKG/Telemetry   []  Cardiology/Vascular   []  Pathology  []  Old records  []  Other:  Most notable findings include:     LAB RESULTS:      Lab 01/05/23  1755   WBC 20.01*   HEMOGLOBIN 13.1   HEMATOCRIT 39.5   PLATELETS 367   NEUTROS ABS 17.97*   IMMATURE GRANS (ABS) 0.08*   LYMPHS ABS 0.90   MONOS ABS 0.97*   EOS ABS 0.01   MCV 94.5   SED RATE 74*   CRP 12.67*   PROCALCITONIN 0.35*   LACTATE 1.9         Lab 01/05/23  1755   SODIUM 135*   POTASSIUM 3.6   CHLORIDE 95*   CO2 27.0   ANION GAP 13.0   BUN 16    CREATININE 0.94   EGFR 69.2   GLUCOSE 364*   CALCIUM 9.7         Lab 01/05/23  1755   TOTAL PROTEIN 8.3   ALBUMIN 4.3   GLOBULIN 4.0   ALT (SGPT) 44*   AST (SGOT) 25   BILIRUBIN 1.2   ALK PHOS 113                     Brief Urine Lab Results     None        Microbiology Results (last 10 days)     ** No results found for the last 240 hours. **          XR Foot 3+ View Right    Result Date: 1/5/2023  XR FOOT 3+ VW RIGHT Date of Exam: 1/5/2023 8:06 PM EST Indication: DM with foot ulcer; rule out gas in tissue. Comparison: None available. Findings: Postoperative changes are noted status post prior resection of the great toe at the level of the distal diaphysis of the proximal phalanx. There is soft tissue swelling within the residual soft tissues of the great toe extending into the soft tissues of the second through fifth digits and surrounding the forefoot. There is also evidence for edema extending towards the midfoot. The findings suggest changes of soft tissue cellulitis. There is suggestion of possible gas production in the soft tissues at the interdigital space between the first and second metatarsophalangeal joints and extending into the base of the residual stump of the first digit as well as the proximal aspect of the second digit. No definitive cortical erosion or destruction is seen.  There is no definitive evidence for osteomyelitis. Changes of arthropathy are seen throughout the midfoot suggesting developing neuropathic arthropathy or Charcot foot.     Impression: Impression: 1.Soft tissue swelling throughout the forefoot and midfoot. The findings suggest changes of soft tissue cellulitis. 2.There is suggestion of gas production in the soft tissues of the residual first digit and extending towards the base of the second digit. 3.No definitive evidence for osteomyelitis. Electronically Signed: Laith Morgan  1/5/2023 8:37 PM EST  Workstation ID: [id number]          Assessment & Plan   Assessment & Plan        Sepsis (HCC)    Ulcer of right foot (HCC)    Type 2 diabetes mellitus (HCC)    Psoriatic arthritis (HCC)    Immunocompromised (HCC)    GERD (gastroesophageal reflux disease)      62 year old female presents to the ED with right foot ulcer that was noted yesterday.     1) Sepsis       Immunocompromised       Ulcer of right foot  -WBC 20.01, procal 0.35  -continue vancomycin and merrem: patient has multiple antibiotic allergies.  Unable to start clindamycin due to allergy   -blood cultures x2 pending   -consult Dr. Ortiz in am   -MRI of right foot pending   -pain control   -hold methotrexate   -wound culture     2) Diabetes mellitus type 2  -check hgb A1c   -start ldssi   -fingersticks achs     3) Psoriatic arthritis   -on methotrexate: hold for now     4) GERD  -PPI     5) Hyperlipidemia  -continue statin     DVT prophylaxis:  scds to LLE     CODE STATUS:  Full Code        Expected Discharge   TBD    This note has been completed as part of a split-shared workflow.     Signature: Electronically signed by JOEY Nguyen, 01/05/23, 11:18 PM EST.      Attending   Admission Attestation       I have performed an independent face-to-face diagnostic evaluation including performing an independent physical examination as documented here.  The documented plan of care above was reviewed and developed with the advanced practice clinician (APC).      Brief Summary Statement:   Maura Leon is a 62 y.o. female With a PMH significant for diabetes mellitus type 2, psoriatic arthritis, HLD, GERD comes to the ED due to complaints of a right foot ulcer.  Patient says she has not been consistent with checking for foot wounds during the holiday season.  Yesterday she noticed some drainage coming from her right foot, she thought she stepped in jelly.  When she closely examined it she found that she had a new ulceration.  Today she reports redness and warmth to the area with low-grade fever, malaise.  Remainder of detailed HPI is as  noted by APC and has been reviewed and/or edited by me for completeness.    Attending Physical Exam:  Temp:  [98.8 °F (37.1 °C)-99.7 °F (37.6 °C)] 98.8 °F (37.1 °C)  Heart Rate:  [] 88  Resp:  [16-20] 16  BP: (103-174)/(51-83) 128/71    Constitutional: Awake, alert  Eyes: PERRLA, sclerae anicteric, no conjunctival injection  HENT: NCAT, mucous membranes moist  Neck: Supple, no thyromegaly, no lymphadenopathy, trachea midline  Respiratory: Clear to auscultation bilaterally, nonlabored respirations   Cardiovascular: RRR, no murmurs, rubs, or gallops, palpable pedal pulses bilaterally  Gastrointestinal: Positive bowel sounds, soft, nontender, nondistended  Musculoskeletal: No bilateral ankle edema, no clubbing or cyanosis to extremities.  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      Brief Assessment/Plan :  See detailed assessment and plan developed with APC which I have reviewed and/or edited for completeness.            Yareli Griffin DO  23                              Electronically signed by Yareli Griffin DO at 23 0135          Emergency Department Notes      Rosita Humphrey APRN at 23 2132     Attestation signed by Eder Leach DO at 23 2311        SUPERVISE: For this patient encounter, I reviewed the APC's documentation, treatment plan, and medical decision making.  Eder Leach DO 2023 23:11 EST                          EMERGENCY DEPARTMENT ENCOUNTER    Pt Name: Maura Leon  MRN: [id number]  Pt :   1960  Room Number:  15/15  Date of encounter:  2023  PCP: Emanuel Fuentes DPM  ED Provider: JOEY Santos    Historian: Patient    HPI:  Chief Complaint: Right foot  lesion        Context: Maura Leon is a 62 y.o. female who presents to the ED c/o discovery of a sore draining on the bottom of her right foot yesterday.  Patient was seen by a primary care provider today and advised to seek emergency care.  [facility]  Patient acknowledges that she has not been vigilant to check the plantar surfaces of her feet since before the holidays.  Patient states she has multiple antibiotic allergies that complicate her medical management.  She is a type II diabetic.    Review of systems is positive for chills and feeling unwell.  No known fever.  Negative for chest pain or cough or shortness of breath.  GI and  systems are negative.  Positive for generalized weakness without orthostatic dizziness or syncope.  No neurosensory complaints or focal weakness with exception of longstanding peripheral neuropathy.      PAST MEDICAL HISTORY  No past medical history on file.      PAST SURGICAL HISTORY  No past surgical history on file.      FAMILY HISTORY  No family history on file.      SOCIAL HISTORY  Social History     Socioeconomic History   • Marital status:          ALLERGIES  Ciprofloxacin, Doxycycline, Nsaids, Sulfa antibiotics, and Tetracyclines & related        REVIEW OF SYSTEMS  Review of Systems     All systems reviewed and negative except for those discussed in HPI.       PHYSICAL EXAM    I have reviewed the triage vital signs and nursing notes.    ED Triage Vitals [01/05/23 1457]   Temp Heart Rate Resp BP SpO2   99.7 °F (37.6 °C) 118 16 174/83 96 %      Temp src Heart Rate Source Patient Position BP Location FiO2 (%)   Oral Monitor Sitting Left arm --       Physical Exam  GENERAL:   Appears in no acute distress.  Triage tachycardia is improving.  Heart rate 100.  She is a good historian  HENT: Wilver patent.  EYES: No scleral icterus.  CV: Regular rhythm, heart rate 100.  Lower extremity edema  1+.  RESPIRATORY: Normal effort.  No audible wheezes, rales or rhonchi.  ABDOMEN: Soft, nontender  MUSCULOSKELETAL: No deformities.  Patient's right foot: The distal great toe is  surgically absent and well-healed.  On the plantar surface there is a callused, blanched ulcer with a depth of approximately 0.5 cm with local erythema.  The distal half of her foot is warm with soft tissue swelling.  The remainder of her toes have brisk cap refill  NEURO: Alert, moves all extremities, follows commands.  SKIN: Warm, dry, no rash visualized.      LAB RESULTS  Recent Results (from the past 24 hour(s))   Comprehensive Metabolic Panel    Collection Time: 01/05/23  5:55 PM    Specimen: Blood   Result Value Ref Range    Glucose 364 (H) 65 - 99 mg/dL    BUN 16 8 - 23 mg/dL    Creatinine 0.94 0.57 - 1.00 mg/dL    Sodium 135 (L) 136 - 145 mmol/L    Potassium 3.6 3.5 - 5.2 mmol/L    Chloride 95 (L) 98 - 107 mmol/L    CO2 27.0 22.0 - 29.0 mmol/L    Calcium 9.7 8.6 - 10.5 mg/dL    Total Protein 8.3 6.0 - 8.5 g/dL    Albumin 4.3 3.5 - 5.2 g/dL    ALT (SGPT) 44 (H) 1 - 33 U/L    AST (SGOT) 25 1 - 32 U/L    Alkaline Phosphatase 113 39 - 117 U/L    Total Bilirubin 1.2 0.0 - 1.2 mg/dL    Globulin 4.0 gm/dL    A/G Ratio 1.1 g/dL    BUN/Creatinine Ratio 17.0 7.0 - 25.0    Anion Gap 13.0 5.0 - 15.0 mmol/L    eGFR 69.2 >60.0 mL/min/1.73   Lactic Acid, Plasma    Collection Time: 01/05/23  5:55 PM    Specimen: Blood   Result Value Ref Range    Lactate 1.9 0.5 - 2.0 mmol/L   CBC Auto Differential    Collection Time: 01/05/23  5:55 PM    Specimen: Blood   Result Value Ref Range    WBC 20.01 (H) 3.40 - 10.80 10*3/mm3    RBC 4.18 3.77 - 5.28 10*6/mm3    Hemoglobin 13.1 12.0 - 15.9 g/dL    Hematocrit 39.5 34.0 - 46.6 %    MCV 94.5 79.0 - 97.0 fL    MCH 31.3 26.6 - 33.0 pg    MCHC 33.2 31.5 - 35.7 g/dL    RDW 13.6 12.3 - 15.4 %    RDW-SD 46.4 37.0 - 54.0 fl    MPV 9.4 6.0 - 12.0 fL    Platelets 367 140 - 450 10*3/mm3    Neutrophil % 89.9 (H) 42.7 -  76.0 %    Lymphocyte % 4.5 (L) 19.6 - 45.3 %    Monocyte % 4.8 (L) 5.0 - 12.0 %    Eosinophil % 0.0 (L) 0.3 - 6.2 %    Basophil % 0.4 0.0 - 1.5 %    Immature Grans % 0.4 0.0 - 0.5 %    Neutrophils, Absolute 17.97 (H) 1.70 - 7.00 10*3/mm3    Lymphocytes, Absolute 0.90 0.70 - 3.10 10*3/mm3    Monocytes, Absolute 0.97 (H) 0.10 - 0.90 10*3/mm3    Eosinophils, Absolute 0.01 0.00 - 0.40 10*3/mm3    Basophils, Absolute 0.08 0.00 - 0.20 10*3/mm3    Immature Grans, Absolute 0.08 (H) 0.00 - 0.05 10*3/mm3    nRBC 0.0 0.0 - 0.2 /100 WBC   Green Top (Gel)    Collection Time: 01/05/23  5:55 PM   Result Value Ref Range    Extra Tube Hold for add-ons.    Lavender Top    Collection Time: 01/05/23  5:55 PM   Result Value Ref Range    Extra Tube hold for add-on    Gold Top - SST    Collection Time: 01/05/23  5:55 PM   Result Value Ref Range    Extra Tube Hold for add-ons.    Light Blue Top    Collection Time: 01/05/23  5:55 PM   Result Value Ref Range    Extra Tube Hold for add-ons.        If labs were ordered, I independently reviewed the results and considered them in treating the patient.        RADIOLOGY  XR Foot 3+ View Right    Result Date: 1/5/2023  XR FOOT 3+ VW RIGHT Date of Exam: 1/5/2023 8:06 PM EST Indication: DM with foot ulcer; rule out gas in tissue. Comparison: None available. Findings: Postoperative changes are noted status post prior resection of the great toe at the level of the distal diaphysis of the proximal phalanx. There is soft tissue swelling within the residual soft tissues of the great toe extending into the soft tissues of the second through fifth digits and surrounding the forefoot. There is also evidence for edema extending towards the midfoot. The findings suggest changes of soft tissue cellulitis. There is suggestion of possible gas production in the soft tissues at the interdigital space between the first and second metatarsophalangeal joints and extending into the base of the residual stump of the  first digit as well as the proximal aspect of the second digit. No definitive cortical erosion or destruction is seen.  There is no definitive evidence for osteomyelitis. Changes of arthropathy are seen throughout the midfoot suggesting developing neuropathic arthropathy or Charcot foot.     Impression: 1.Soft tissue swelling throughout the forefoot and midfoot. The findings suggest changes of soft tissue cellulitis. 2.There is suggestion of gas production in the soft tissues of the residual first digit and extending towards the base of the second digit. 3.No definitive evidence for osteomyelitis. Electronically Signed: Laith Morgan  1/5/2023 8:37 PM EST  Workstation ID: [id number]      PROCEDURES    Procedures    No orders to display       MEDICATIONS GIVEN IN ER    Medications   sodium chloride 0.9 % flush 10 mL (has no administration in time range)   vancomycin 2250 mg/500 mL 0.9% NS IVPB ([facility]) (has no administration in time range)   meropenem (MERREM) 1 g/100 mL 0.9% NS (mbp) (has no administration in time range)         MEDICAL DECISION MAKING, PROGRESS, and CONSULTS    All labs have been independently reviewed by me.  All radiology studies have been reviewed by me and the radiologist dictating the report.  All EKG's have been independently viewed and interpreted by me.        Orders placed during this visit:  Orders Placed This Encounter   Procedures   • Blood Culture - Blood,   • Blood Culture - Blood,   • XR Foot 3+ View Right   • Comprehensive Metabolic Panel   • Lactic Acid, Plasma   • [place] Draw   • CBC Auto Differential   • Undress & Gown   • Continuous Pulse Oximetry   • Vital Signs   • Oxygen Therapy- Nasal Cannula; 2 LPM; Titrate for SPO2: 92%, Greater Than or Equal To   • Insert Peripheral IV   • CBC & Differential   • Green Top (Gel)   • Lavender Top   • Gold Top - SST   • Gray Top   • Light Blue Top         Additional orders considered but not ordered:      ED Course:    Consultants:      ED  Course as of 01/05/23 2144   Thu Jan 05, 2023 1951 WBC(!): 20.01 [MS]   2142 Patient's work-up is most remarkable for leukocytosis, hyperglycemia and plain imaging consistent with soft tissue swelling of cellulitis as well as the possibility of the presence of gas in the tissue.  Patient understands and concurs with inpatient plan.  She has had no hypotension.  She is slightly tachycardic.  Antibiotics been initiated. [MS]      ED Course User Index  [MS] Rosita Humphrey APRN                  AS OF 21:32 EST VITALS:    BP - 152/53  HR - 101  TEMP - 99.7 °F (37.6 °C) (Oral)  O2 SATS - 97%                   DIAGNOSIS  Final diagnoses:   Cellulitis of right foot   History of diabetes mellitus, type II         DISPOSITION    Admitted         Please note that portions of this document were completed with voice recognition software.      Rosita Humphrey APRN  01/05/23 2214      Electronically signed by Eder Leach DO at 01/05/23 2311         Current Facility-Administered Medications   Medication Dose Route Frequency Provider Last Rate Last Admin   • acetaminophen (TYLENOL) tablet 650 mg  650 mg Oral Q4H PRN Mike, Giuliana, APRN        Or   • acetaminophen (TYLENOL) 160 MG/5ML solution 650 mg  650 mg Oral Q4H PRN Mike, Giuliana, APRN        Or   • acetaminophen (TYLENOL) suppository 650 mg  650 mg Rectal Q4H PRN Mike, Giuliana, APRN       • aspirin chewable tablet 81 mg  81 mg Oral Daily Mike, Giuliana, APRN       • atorvastatin (LIPITOR) tablet 10 mg  10 mg Oral Daily Mike, Giuliana, APRN       • betamethasone (augmented) (DIPROLENE) 0.05 % cream 1 application  1 application Topical BID Mike, Giuliana, APRN       • Calcium Carb-Cholecalciferol 600-20 MG-MCG tablet 1 tablet  1 tablet Oral Daily Mike, Giuliana, APRN       • cetirizine (zyrTEC) tablet 5 mg  5 mg Oral Daily Mike, Giuliana, APRN       • DAPTOmycin (CUBICIN) 500 mg in sodium chloride 0.9 % 50 mL IVPB  6 mg/kg (Adjusted)  Intravenous Q24H Kevin Abdul MD       • dextrose (D50W) (25 g/50 mL) IV injection 25 g  25 g Intravenous Q15 Min PRN Mike, Giuliana, APRN       • dextrose (GLUTOSE) oral gel 15 g  15 g Oral Q15 Min PRN Mike, Giuliana, APRN       • folic acid (FOLVITE) tablet 0.5 mg  0.5 mg Oral Daily Mike, Giuliana, APRN       • glucagon (human recombinant) (GLUCAGEN DIAGNOSTIC) injection 1 mg  1 mg Intramuscular Q15 Min PRN Mike, Giuliana, APRN       • hydroCHLOROthiazide (HYDRODIURIL) tablet 50 mg  50 mg Oral Daily Mike, Giuliana, APRN       • Insulin Lispro (humaLOG) injection 0-7 Units  0-7 Units Subcutaneous 4x Daily With Meals & Nightly Mike, Giuliana, APRN   2 Units at 01/06/23 0937   • nystatin (MYCOSTATIN) powder   Topical Q8H Mike, Giuliana, APRN       • pantoprazole (PROTONIX) EC tablet 40 mg  40 mg Oral Daily Dereje Vega IV, PharmD       • piperacillin-tazobactam (ZOSYN) 3.375 g in iso-osmotic dextrose 50 ml (premix)  3.375 g Intravenous Once Kevin Abdul MD       • piperacillin-tazobactam (ZOSYN) 3.375 g in iso-osmotic dextrose 50 ml (premix)  3.375 g Intravenous Q8H Kevin Abdul MD       • potassium chloride (MICRO-K) CR capsule 40 mEq  40 mEq Oral PRN Jazmin Cotton MD   40 mEq at 01/06/23 0921    Or   • potassium chloride (KLOR-CON) packet 40 mEq  40 mEq Oral PRN Jazmin Cotton MD        Or   • potassium chloride 10 mEq in 100 mL IVPB  10 mEq Intravenous Q1H PRN Jazmin Cotton MD       • sodium chloride 0.9 % flush 10 mL  10 mL Intravenous PRN Eder Leach DO       • sodium chloride 0.9 % flush 10 mL  10 mL Intravenous Q12H Mike, Giuliana, APRN   10 mL at 01/06/23 0034   • sodium chloride 0.9 % flush 10 mL  10 mL Intravenous PRN Mike, Giuliana, APRN       • sodium chloride 0.9 % infusion 40 mL  40 mL Intravenous PRN Mike, Giuliana, APRN       • vitamin D3 capsule 5,000 Units  5,000 Units Oral Daily Mike, Giuliana, APRN           Lab Results  (last 24 hours)     Procedure Component Value Units Date/Time    CK [[id number]] Collected: 01/06/23 0411    Specimen: Blood Updated: 01/06/23 1028    MRSA Screen, PCR (Inpatient) - Swab, Nares [[id number]] Collected: 01/06/23 0939    Specimen: Swab from Nares Updated: 01/06/23 0959    Magnesium [[id number]]  (Normal) Collected: 01/06/23 0411    Specimen: Blood Updated: 01/06/23 0855     Magnesium 1.9 mg/dL     POC Glucose Once [[id number]]  (Abnormal) Collected: 01/06/23 0723    Specimen: Blood Updated: 01/06/23 0724     Glucose 187 mg/dL      Comment: Meter: [id number] : [id number] Andrew Mckeon       Basic Metabolic Panel [[id number]]  (Abnormal) Collected: 01/06/23 0411    Specimen: Blood Updated: 01/06/23 0627     Glucose 189 mg/dL      BUN 14 mg/dL      Creatinine 0.77 mg/dL      Sodium 137 mmol/L      Potassium 3.1 mmol/L      Chloride 99 mmol/L      CO2 27.0 mmol/L      Calcium 9.3 mg/dL      BUN/Creatinine Ratio 18.2     Anion Gap 11.0 mmol/L      eGFR 87.9 mL/min/1.73      Comment: National Kidney Foundation and American Society of Nephrology (ASN) Task Force recommended calculation based on the Chronic Kidney Disease Epidemiology Collaboration (CKD-EPI) equation refit without adjustment for race.       Narrative:      GFR Normal >60  Chronic Kidney Disease <60  Kidney Failure <15      Hemoglobin A1c [[id number]]  (Abnormal) Collected: 01/06/23 0411    Specimen: Blood Updated: 01/06/23 0625     Hemoglobin A1C 8.20 %     Narrative:      Hemoglobin A1C Ranges:    Increased Risk for Diabetes  5.7% to 6.4%  Diabetes                     >= 6.5%  Diabetic Goal                < 7.0%    Wound Culture - Swab, Foot, Right [[id number]] Collected: 01/05/23 2201    Specimen: Swab from Foot, Right Updated: 01/06/23 0615     Gram Stain No WBCs or organisms seen    CBC Auto Differential [[id number]]  (Abnormal) Collected: 01/06/23 0411    Specimen: Blood Updated: 01/06/23 0548     WBC 16.19 10*3/mm3      RBC 3.64 10*6/mm3      " Hemoglobin 11.5 g/dL      Hematocrit 34.2 %      MCV 94.0 fL      MCH 31.6 pg      MCHC 33.6 g/dL      RDW 13.5 %      RDW-SD 46.1 fl      MPV 9.6 fL      Platelets 287 10*3/mm3      Neutrophil % 84.8 %      Lymphocyte % 6.5 %      Monocyte % 7.4 %      Eosinophil % 0.2 %      Basophil % 0.4 %      Immature Grans % 0.7 %      Neutrophils, Absolute 13.72 10*3/mm3      Lymphocytes, Absolute 1.05 10*3/mm3      Monocytes, Absolute 1.20 10*3/mm3      Eosinophils, Absolute 0.03 10*3/mm3      Basophils, Absolute 0.07 10*3/mm3      Immature Grans, Absolute 0.12 10*3/mm3      nRBC 0.0 /100 WBC     Procalcitonin [[id number]]  (Abnormal) Collected: 01/05/23 1755    Specimen: Blood Updated: 01/05/23 2223     Procalcitonin 0.35 ng/mL     Narrative:      As a Marker for Sepsis (Non-Neonates):    1. <0.5 ng/mL represents a low risk of severe sepsis and/or septic shock.  2. >2 ng/mL represents a high risk of severe sepsis and/or septic shock.    As a Marker for Lower Respiratory Tract Infections that require antibiotic therapy:    PCT on Admission    Antibiotic Therapy       6-12 Hrs later    >0.5                Strongly Recommended  >0.25 - <0.5        Recommended   0.1 - 0.25          Discouraged              Remeasure/reassess PCT  <0.1                Strongly Discouraged     Remeasure/reassess PCT    As 28 day mortality risk marker: \"Change in Procalcitonin Result\" (>80% or <=80%) if Day 0 (or Day 1) and Day 4 values are available. Refer to http://www.[facility]-pct-calculator.com    Change in PCT <=80%  A decrease of PCT levels below or equal to 80% defines a positive change in PCT test result representing a higher risk for 28-day all-cause mortality of patients diagnosed with severe sepsis for septic shock.    Change in PCT >80%  A decrease of PCT levels of more than 80% defines a negative change in PCT result representing a lower risk for 28-day all-cause mortality of patients diagnosed with severe sepsis or septic shock.       " C-reactive Protein [[id number]]  (Abnormal) Collected: 01/05/23 1755    Specimen: Blood Updated: 01/05/23 2216     C-Reactive Protein 12.67 mg/dL     Sedimentation Rate [[id number]]  (Abnormal) Collected: 01/05/23 1755    Specimen: Blood Updated: 01/05/23 2213     Sed Rate 74 mm/hr     [place] Draw [[id number]] Collected: 01/05/23 1755    Specimen: Blood Updated: 01/05/23 2200    Narrative:      The following orders were created for panel order [place] Draw.  Procedure                               Abnormality         Status                     ---------                               -----------         ------                     Green Top (Gel)[[id number]]                                  Final result               Lavender Top[[id number]]                                     Final result               Gold Top - SST[[id number]]                                   Final result               Schilling Top[[id number]]                                         Final result               Light Blue Top[[id number]]                                   Final result                 Please view results for these tests on the individual orders.    Gray Top [[id number]] Collected: 01/05/23 1755    Specimen: Blood Updated: 01/05/23 2200     Extra Tube Hold for add-ons.     Comment: Auto resulted.       Blood Culture - Blood, Arm, Left [[id number]] Collected: 01/05/23 2105    Specimen: Blood from Arm, Left Updated: 01/05/23 2121    Green Top (Gel) [[id number]] Collected: 01/05/23 1755    Specimen: Blood Updated: 01/05/23 1900     Extra Tube Hold for add-ons.     Comment: Auto resulted.       Lavender Top [[id number]] Collected: 01/05/23 1755    Specimen: Blood Updated: 01/05/23 1900     Extra Tube hold for add-on     Comment: Auto resulted       Gold Top - SST [[id number]] Collected: 01/05/23 1755    Specimen: Blood Updated: 01/05/23 1900     Extra Tube Hold for add-ons.     Comment: Auto resulted.       Light Blue Top [[id number]] Collected: 01/05/23  1755    Specimen: Blood Updated: 01/05/23 1900     Extra Tube Hold for add-ons.     Comment: Auto resulted       Comprehensive Metabolic Panel [[id number]]  (Abnormal) Collected: 01/05/23 1755    Specimen: Blood Updated: 01/05/23 1821     Glucose 364 mg/dL      BUN 16 mg/dL      Creatinine 0.94 mg/dL      Sodium 135 mmol/L      Potassium 3.6 mmol/L      Chloride 95 mmol/L      CO2 27.0 mmol/L      Calcium 9.7 mg/dL      Total Protein 8.3 g/dL      Albumin 4.3 g/dL      ALT (SGPT) 44 U/L      AST (SGOT) 25 U/L      Alkaline Phosphatase 113 U/L      Total Bilirubin 1.2 mg/dL      Globulin 4.0 gm/dL      Comment: Calculated Result        A/G Ratio 1.1 g/dL      BUN/Creatinine Ratio 17.0     Anion Gap 13.0 mmol/L      eGFR 69.2 mL/min/1.73      Comment: National Kidney Foundation and American Society of Nephrology (ASN) Task Force recommended calculation based on the Chronic Kidney Disease Epidemiology Collaboration (CKD-EPI) equation refit without adjustment for race.       Narrative:      GFR Normal >60  Chronic Kidney Disease <60  Kidney Failure <15      Lactic Acid, Plasma [[id number]]  (Normal) Collected: 01/05/23 1755    Specimen: Blood Updated: 01/05/23 1819     Lactate 1.9 mmol/L      Comment: Falsely depressed results may occur on samples drawn from patients receiving N-Acetylcysteine (NAC) or Metamizole.       CBC & Differential [[id number]]  (Abnormal) Collected: 01/05/23 1755    Specimen: Blood Updated: 01/05/23 1808    Narrative:      The following orders were created for panel order CBC & Differential.  Procedure                               Abnormality         Status                     ---------                               -----------         ------                     CBC Auto Differential[[id number]]        Abnormal            Final result                 Please view results for these tests on the individual orders.    CBC Auto Differential [[id number]]  (Abnormal) Collected: 01/05/23 1755    Specimen:  Blood Updated: 01/05/23 1808     WBC 20.01 10*3/mm3      RBC 4.18 10*6/mm3      Hemoglobin 13.1 g/dL      Hematocrit 39.5 %      MCV 94.5 fL      MCH 31.3 pg      MCHC 33.2 g/dL      RDW 13.6 %      RDW-SD 46.4 fl      MPV 9.4 fL      Platelets 367 10*3/mm3      Neutrophil % 89.9 %      Lymphocyte % 4.5 %      Monocyte % 4.8 %      Eosinophil % 0.0 %      Basophil % 0.4 %      Immature Grans % 0.4 %      Neutrophils, Absolute 17.97 10*3/mm3      Lymphocytes, Absolute 0.90 10*3/mm3      Monocytes, Absolute 0.97 10*3/mm3      Eosinophils, Absolute 0.01 10*3/mm3      Basophils, Absolute 0.08 10*3/mm3      Immature Grans, Absolute 0.08 10*3/mm3      nRBC 0.0 /100 WBC     Blood Culture - Blood, Arm, Left [[id number]] Collected: 01/05/23 1758    Specimen: Blood from Arm, Left Updated: 01/05/23 1807        Imaging Results (Last 24 Hours)     Procedure Component Value Units Date/Time    MRI Foot Right With & Without Contrast [[id number]] Collected: 01/06/23 0209     Updated: 01/06/23 0413    Narrative:      MRI OF THE RIGHT FOOT WITH AND WITHOUT INTRAVENOUS CONTRAST    HISTORY: Right forefoot pain.    TECHNIQUE: Multiplanar and multisequence MR imaging of the right forefoot was performed without the administration of intravenous gadolinium. Imaging sequences include axial and sagittal T1, axial and sagittal proton density, coronal and sagittal   fat-suppressed proton density, and axial fat-suppressed T2-weighted images. 20 mL of MultiHance was administered.    COMPARISON: None.    FINDINGS:    There is a large amount of diffuse soft tissue swelling of the forefoot that could be a mixture of cellulitis and dependent edema. There is a wound/ulceration over the plantar aspect of the second metatarsal phalangeal joint with some necrosis and poor   enhancement of the plantar soft tissues at this site. There is no abscess there is no soft tissue gas. No evidence of osteomyelitis. Partial amputation of the great toe.           Impression:      1. No abscess. No osteomyelitis.  2. Plantar wound/ulceration over the second metatarsal phalangeal joint with some adjacent soft tissue necrosis an inflammatory phlegmon but no well-defined fluid collection.  3. Diffuse soft tissue swelling that could be a mixture of cellulitis and dependent edema.    Electronically signed by:  Burke Evans M.D.    1/6/2023 2:11 AM Mountain Time    XR Foot 3+ View Right [[id number]] Collected: 01/05/23 2033     Updated: 01/05/23 2039    Narrative:      XR FOOT 3+ VW RIGHT    Date of Exam: 1/5/2023 8:06 PM EST    Indication: DM with foot ulcer; rule out gas in tissue.    Comparison: None available.    Findings:  Postoperative changes are noted status post prior resection of the great toe at the level of the distal diaphysis of the proximal phalanx. There is soft tissue swelling within the residual soft tissues of the great toe extending into the soft tissues of   the second through fifth digits and surrounding the forefoot. There is also evidence for edema extending towards the midfoot. The findings suggest changes of soft tissue cellulitis. There is suggestion of possible gas production in the soft tissues at   the interdigital space between the first and second metatarsophalangeal joints and extending into the base of the residual stump of the first digit as well as the proximal aspect of the second digit. No definitive cortical erosion or destruction is seen.   There is no definitive evidence for osteomyelitis. Changes of arthropathy are seen throughout the midfoot suggesting developing neuropathic arthropathy or Charcot foot.      Impression:      Impression:  1.Soft tissue swelling throughout the forefoot and midfoot. The findings suggest changes of soft tissue cellulitis.  2.There is suggestion of gas production in the soft tissues of the residual first digit and extending towards the base of the second digit.  3.No definitive evidence for  osteomyelitis.    Electronically Signed: Laith Morgan    1/5/2023 8:37 PM EST    Workstation ID: [id number]               Consult Notes (last 24 hours)      Ester Silverman PA-C at 01/06/23 0809      Consult Orders    1. Inpatient Cardiothoracic Surgery Consult [[id number]] ordered by Giuliana Mckeon APRN at 01/05/23 3717               Cardiothoracic Surgery History & Physical           Chief complaint: right foot wound    HPI:   Ms. Maura Leon is a 63yo female with PMH of diabetes, GERD, arthritis who presented to the ED last night with complaints of a right foot wound. She initially presented to her PCP who advised her to come to the ED. She had been experiencing low grade temperature and body aches and noted swelling and redness around the site of the ulceration. She has undergone previous right toe amputation.  Currently, patient reports that she has no feeling to her feet. But she sometimes experiences burning sensation on the tips of her toes. She does not recall how she developed the ulceration, whether she may have stepped on glass or other foreign body. She reports that her A1c is actually improved since last check. She is not a smoker.       Past Medical History:   Diagnosis Date   • Arthritis    • Arthritis    • Diabetes mellitus (HCC)    • GERD (gastroesophageal reflux disease)    • Irritable bowel    • Sleep apnea      Past Surgical History:   Procedure Laterality Date   • TOE SURGERY Right 06/30/2022     Family History   Problem Relation Age of Onset   • Diabetes Mother    • Osteoporosis Mother    • Hypertension Father      Social History     Tobacco Use   • Smoking status: Never   • Smokeless tobacco: Never   Substance Use Topics   • Alcohol use: Yes     Alcohol/week: 1.0 standard drink     Types: 1 Glasses of wine per week   • Drug use: Never       Medications Prior to Admission   Medication Sig Dispense Refill Last Dose   • aspirin 81 MG chewable tablet Chew 81 mg Daily.      • atorvastatin  (LIPITOR) 10 MG tablet Take 10 mg by mouth Daily.      • betamethasone, augmented, (DIPROLENE) 0.05 % cream Apply 1 application topically to the appropriate area as directed 2 (Two) Times a Day.      • Biotin 88937 MCG tablet Take  by mouth.      • calcium carbonate (OS-SUSAN) 600 MG tablet Take 600 mg by mouth Daily.      • dexlansoprazole (DEXILANT) 60 MG capsule Take 60 mg by mouth Daily.      • fluocinolone 0.01 % cream Apply 1 application topically to the appropriate area as directed 2 (Two) Times a Day.      • folic acid (FOLVITE) 0.5 MG half tablet Take 0.5 mg by mouth Daily.      • hydroCHLOROthiazide (HYDRODIURIL) 25 MG tablet Take 50 mg by mouth Daily.      • Insulin Degludec (TRESIBA SC) Inject  under the skin into the appropriate area as directed.      • Loratadine (Claritin) 10 MG capsule Take  by mouth.      • metFORMIN (GLUCOPHAGE) 500 MG tablet Take 500 mg by mouth 2 (Two) Times a Day With Meals.      • Minoxidil 5 % solution Apply  topically.      • MOMETASONE FUROATE EX Apply  topically.      • nystatin 956187 UNIT/GM powder Apply  topically to the appropriate area as directed 4 (Four) Times a Day.      • potassium citrate (UROCIT-K) 10 MEQ (1080 MG) CR tablet Take 10 mEq by mouth 3 (Three) Times a Day With Meals.      • selenium sulfide (SELSUN) 2.5 % lotion Apply  topically to the appropriate area as directed Daily As Needed for Dandruff.      • Semaglutide,0.25 or 0.5MG/DOS, (OZEMPIC) 2 MG/1.5ML solution pen-injector Inject  under the skin into the appropriate area as directed 1 (One) Time Per Week.      • Turmeric 500 MG capsule Take 1,000 mg by mouth.      • vitamin C (ASCORBIC ACID) 250 MG tablet Take 500 mg by mouth Daily.      • vitamin D (ERGOCALCIFEROL) 1.25 MG (98092 UT) capsule capsule Take 50,000 Units by mouth 1 (One) Time Per Week.      • vitamin D3 125 MCG (5000 UT) capsule capsule Take 5,000 Units by mouth Daily.      • Zinc 25 MG tablet Take  by mouth.        Allergies:   Ciprofloxacin, Doxycycline, Nsaids, Sulfa antibiotics, and Tetracyclines & related    Review of Systems:  A comprehensive review of systems was negative except for:     All other systems were reviewed and were negative.    Vital Signs:  Temp:  [98.8 °F (37.1 °C)-99.7 °F (37.6 °C)] 98.8 °F (37.1 °C)  Heart Rate:  [] 88  Resp:  [16-20] 18  BP: (103-174)/(50-83) 104/50    Physical Exam:  Physical Exam  Vitals and nursing note reviewed.   HENT:      Head: Normocephalic and atraumatic.   Cardiovascular:      Rate and Rhythm: Normal rate and regular rhythm.      Pulses: Normal pulses.           Dorsalis pedis pulses are 2+ on the right side and 2+ on the left side.        Posterior tibial pulses are 2+ on the right side and 2+ on the left side.      Heart sounds: Normal heart sounds.   Pulmonary:      Effort: Pulmonary effort is normal.      Breath sounds: Normal breath sounds.   Abdominal:      General: Abdomen is flat.      Palpations: Abdomen is soft.   Musculoskeletal:        Feet:    Feet:      Left foot:      Skin integrity: Skin integrity normal.   Skin:     General: Skin is warm and dry.      Capillary Refill: Capillary refill takes less than 2 seconds.   Neurological:      Mental Status: She is oriented to person, place, and time.   Psychiatric:         Mood and Affect: Mood normal.         Behavior: Behavior normal.         Labs:  Results from last 7 days   Lab Units 01/06/23  0411   WBC 10*3/mm3 16.19*   HEMOGLOBIN g/dL 11.5*   HEMATOCRIT % 34.2   PLATELETS 10*3/mm3 287     Results from last 7 days   Lab Units 01/06/23  0411   SODIUM mmol/L 137   POTASSIUM mmol/L 3.1*   CHLORIDE mmol/L 99   CO2 mmol/L 27.0   BUN mg/dL 14   CREATININE mg/dL 0.77   GLUCOSE mg/dL 189*   CALCIUM mg/dL 9.3         Coagulation: No results found for: INR, APTT  Cardiac markers:     ABGs:       Invalid input(s): PO2    Imaging:   XR FOOT 3+ VW RIGHT     Date of Exam: 1/5/2023 8:06 PM EST     Indication: DM with foot ulcer; rule  out gas in tissue.     Comparison: None available.     Findings:  Postoperative changes are noted status post prior resection of the great toe at the level of the distal diaphysis of the proximal phalanx. There is soft tissue swelling within the residual soft tissues of the great toe extending into the soft tissues of   the second through fifth digits and surrounding the forefoot. There is also evidence for edema extending towards the midfoot. The findings suggest changes of soft tissue cellulitis. There is suggestion of possible gas production in the soft tissues at   the interdigital space between the first and second metatarsophalangeal joints and extending into the base of the residual stump of the first digit as well as the proximal aspect of the second digit. No definitive cortical erosion or destruction is seen.   There is no definitive evidence for osteomyelitis. Changes of arthropathy are seen throughout the midfoot suggesting developing neuropathic arthropathy or Charcot foot.     IMPRESSION:  Impression:  1.Soft tissue swelling throughout the forefoot and midfoot. The findings suggest changes of soft tissue cellulitis.  2.There is suggestion of gas production in the soft tissues of the residual first digit and extending towards the base of the second digit.  3.No definitive evidence for osteomyelitis.     Electronically Signed: Laith Morgan    1/5/2023 8:37 PM EST    Workstation ID: [id number]    MRI OF THE RIGHT FOOT WITH AND WITHOUT INTRAVENOUS CONTRAST     HISTORY: Right forefoot pain.     TECHNIQUE: Multiplanar and multisequence MR imaging of the right forefoot was performed without the administration of intravenous gadolinium. Imaging sequences include axial and sagittal T1, axial and sagittal proton density, coronal and sagittal   fat-suppressed proton density, and axial fat-suppressed T2-weighted images. 20 mL of MultiHance was administered.     COMPARISON: None.     FINDINGS:     There is a large  amount of diffuse soft tissue swelling of the forefoot that could be a mixture of cellulitis and dependent edema. There is a wound/ulceration over the plantar aspect of the second metatarsal phalangeal joint with some necrosis and poor   enhancement of the plantar soft tissues at this site. There is no abscess there is no soft tissue gas. No evidence of osteomyelitis. Partial amputation of the great toe.           IMPRESSION:  1. No abscess. No osteomyelitis.  2. Plantar wound/ulceration over the second metatarsal phalangeal joint with some adjacent soft tissue necrosis an inflammatory phlegmon but no well-defined fluid collection.  3. Diffuse soft tissue swelling that could be a mixture of cellulitis and dependent edema.     Electronically signed by:  Burke Evans M.D.    1/6/2023 2:11 AM Mountain Time    Assessment:   Patient Active Problem List   Diagnosis   • Sepsis (HCC)   • Ulcer of right foot (HCC)   • Type 2 diabetes mellitus (HCC)   • Psoriatic arthritis (HCC)   • Immunocompromised (HCC)   • GERD (gastroesophageal reflux disease)       Plan:   IV antibiotics per Infectious Disease   MRI right foot showed no osteomyelitis, however will discuss findings with Dr. Raygoza concern for gas gangrene   NPO      Ester Silverman PA-C  01/06/23  08:09 EST            Electronically signed by Ester Silverman PA-C at 01/06/23 0939     Dereje Vega IV, PharmD at 01/06/23 0500          Pharmacy Consult-Vancomycin Dosing  Maura Leon is a  62 y.o. female receiving vancomycin therapy.     Indication: SSTI, Sepsis  Consulting Provider: Hospitalist  ID Consult:     Goal AUC: 400 - 600 mg/L*hr    Current Antimicrobial Therapy  Anti-Infectives (From admission, onward)      Ordered     Dose/Rate Route Frequency Start Stop    01/06/23 0500  vancomycin in dextrose 5% 150 mL (VANCOCIN) IVPB 750 mg        Ordering Provider: Dereje Vega IV, PharmD    750 mg  over 60 Minutes Intravenous Every 12 Hours 01/06/23 1200  "01/11/23 1159    01/06/23 0024  meropenem (MERREM) 1 g/100 mL 0.9% NS (mbp)        Ordering Provider: Giuliana Mckeon APRN    1 g  over 3 Hours Intravenous Every 8 Hours 01/06/23 0400 01/10/23 0359    01/06/23 0024  Pharmacy to dose vancomycin        Ordering Provider: Giuliana Mckeon APRN     Does not apply Continuous PRN 01/06/23 0024 01/11/23 0023    01/05/23 2047  vancomycin 2250 mg/500 mL 0.9% NS IVPB ([facility])        Ordering Provider: Rosita Humphrey APRN    20 mg/kg × 109 kg  over 2.5 Hours Intravenous Once 01/05/23 2230 01/06/23 0303    01/05/23 2132  meropenem (MERREM) 1 g/100 mL 0.9% NS (mbp)        Ordering Provider: Rosita Humphrey APRN    1 g  over 30 Minutes Intravenous Once 01/05/23 2200 01/05/23 2244            Allergies  Allergies as of 01/05/2023 - Reviewed 01/05/2023   Allergen Reaction Noted   • Ciprofloxacin Provider Review Needed 01/05/2023   • Doxycycline Provider Review Needed 01/05/2023   • Nsaids Provider Review Needed 01/05/2023   • Sulfa antibiotics Provider Review Needed 01/05/2023   • Tetracyclines & related Provider Review Needed 01/05/2023       Labs    Results from last 7 days   Lab Units 01/05/23  1755   BUN mg/dL 16   CREATININE mg/dL 0.94       Results from last 7 days   Lab Units 01/05/23  1755   WBC 10*3/mm3 20.01*       Evaluation of Dosing     Last Dose Received in the ED/Outside Facility: N/A  Is Patient on Dialysis or Renal Replacement: N    Ht - 167.6 cm (66\")  Wt - 116 kg (255 lb)    Estimated Creatinine Clearance: 81.4 mL/min (by C-G formula based on SCr of 0.94 mg/dL).    Intake & Output (last 3 days)         01/03 0701  01/04 0700 01/04 0701  01/05 0700 01/05 0701 01/06 0700    IV Piggyback   1100    Total Intake(mL/kg)   1100 (9.5)    Net   +1100           Urine Unmeasured Occurrence   2 x            Microbiology and Radiology  Microbiology Results (last 10 days)       ** No results found for the last 240 hours. **            Reported Vancomycin Levels      "                    InsightRX AUC Calculation:    Current AUC:    mg/L*hr    Predicted Steady State AUC on Current Dose:  mg/L*hr  _________________________________    Predicted Steady State AUC on New Dose:   492 mg/L*hr    Assessment/Plan:    Moderate age morbidly obese female with normal renal markers.  2250 mg IV vancomycin load.  Start 750 mg IV q12h at noon.  Random level tomorrow with AM labs.  MRSA PCR per protocol.    Thank you for this consult.  Dereje Vega IV, PharmD, BCPS  1/6/2023  05:00 EST      Electronically signed by Dereje Vega IV, PharmD at 01/06/23 5123

## 2023-01-06 NOTE — H&P
[facility] Medicine Services  HISTORY AND PHYSICAL    Patient Name: Maura Leon  : 1960  MRN: [id number]  Primary Care Physician: Emanuel Fuentes DPM  Date of admission: 2023    Subjective   Subjective     Chief Complaint:  Right foot wound     HPI:  Maura Leon is a 62 y.o. female with a past medical history significant for diabetes mellitus type 2, psoriatic arthritis, hyperlipidemia and GERD presents to the ED with complaints of a right foot ulcer that she noticed yesterday.  Patient states that she tries to be consistent with checking her feet every other day for wounds.  Yesterday she noticed when she went to take her sock off it stuck to her foot.  She then got a mirror to look at the bottom of her right foot and noticed the ulceration.   She was evaluated by her podiatrist this morning and was sent to the ED for further evaluation.  She reports increased swelling, redness and warmth over the past 24 hours.  Patient denies any known injury, cough, shortness of air, nausea, vomiting, diarrhea or chest pain.  She does report low grade temp of 99 and body aches.  Xray of right foot obtained tonight shows soft tissue swelling throughout the forefoot and midfoot.  The findings suggest changes of soft tissue cellulitis.  There is suggestion of gas production in the soft tissue of the residual first digit and extending towards the base of the second digit.  Patient will be admitted to [facility] under the care of the Hospitalist for further evaluation and treatment.         Review of Systems   Constitutional: Positive for fever. Negative for appetite change, chills, diaphoresis, fatigue and unexpected weight change.   HENT: Negative.    Eyes: Negative for photophobia and visual disturbance.   Respiratory: Negative for cough, chest tightness and shortness of breath.    Cardiovascular: Negative for chest pain, palpitations and leg swelling.   Gastrointestinal: Negative for abdominal  distention, abdominal pain, constipation, diarrhea, nausea and vomiting.   Genitourinary: Negative.    Musculoskeletal: Positive for myalgias. Negative for neck pain and neck stiffness.   Skin: Positive for color change and wound.   Neurological: Negative for dizziness, speech difficulty, weakness, light-headedness, numbness and headaches.   Psychiatric/Behavioral: Negative.         All other systems reviewed and are negative.     Personal History     No past medical history on file.          No past surgical history on file.    Family History:  family history is not on file. Otherwise pertinent FHx was reviewed and unremarkable.     Social History:    Social History     Social History Narrative   • Not on file       Medications:  Biotin, Insulin Degludec, Loratadine, Minoxidil, Mometasone Furoate, Semaglutide(0.25 or 0.5MG/DOS), Turmeric, Zinc, aspirin, atorvastatin, betamethasone (augmented), calcium carbonate, dexlansoprazole, fluocinolone, folic acid, hydroCHLOROthiazide, metFORMIN, nystatin, potassium citrate, selenium sulfide, vitamin C, vitamin D, and vitamin D3    Allergies   Allergen Reactions   • Ciprofloxacin Provider Review Needed   • Doxycycline Provider Review Needed   • Nsaids Provider Review Needed   • Sulfa Antibiotics Provider Review Needed   • Tetracyclines & Related Provider Review Needed       Objective   Objective     Vital Signs:   Temp:  [99.7 °F (37.6 °C)] 99.7 °F (37.6 °C)  Heart Rate:  [] 90  Resp:  [16-20] 18  BP: (103-174)/(51-83) 105/59    Physical Exam  Vitals and nursing note reviewed.   Constitutional:       General: She is not in acute distress.     Appearance: Normal appearance. She is not ill-appearing, toxic-appearing or diaphoretic.   HENT:      Head: Normocephalic and atraumatic.      Nose: Nose normal.      Mouth/Throat:      Mouth: Mucous membranes are dry.      Pharynx: Oropharynx is clear.   Eyes:      Extraocular Movements: Extraocular movements intact.       Conjunctiva/sclera: Conjunctivae normal.      Pupils: Pupils are equal, round, and reactive to light.   Cardiovascular:      Rate and Rhythm: Normal rate and regular rhythm.      Pulses: Normal pulses.      Heart sounds: Normal heart sounds.   Pulmonary:      Effort: Pulmonary effort is normal.      Breath sounds: Normal breath sounds.   Abdominal:      General: Bowel sounds are normal. There is no distension.      Palpations: Abdomen is soft. There is no mass.      Tenderness: There is no abdominal tenderness. There is no right CVA tenderness, left CVA tenderness, guarding or rebound.      Hernia: No hernia is present.   Musculoskeletal:         General: No swelling, tenderness, deformity or signs of injury. Normal range of motion.      Cervical back: Normal range of motion and neck supple.      Right lower leg: No edema.      Left lower leg: No edema.   Skin:     General: Skin is warm.      Comments: On the plantar surface of the right foot ulcer noted with a depth of approximately 0.5 cm with local erythema.  The distal half of her foot is warm with soft tissue swelling.    Neurological:      General: No focal deficit present.      Mental Status: She is alert and oriented to person, place, and time. Mental status is at baseline.   Psychiatric:         Mood and Affect: Mood normal.         Behavior: Behavior normal.         Thought Content: Thought content normal.         Judgment: Judgment normal.            Result Review:  I have personally reviewed the results from the time of this admission to 1/5/2023 23:04 EST and agree with these findings:  [x]  Laboratory list / accordion  []  Microbiology  [x]  Radiology  []  EKG/Telemetry   []  Cardiology/Vascular   []  Pathology  []  Old records  []  Other:  Most notable findings include:     LAB RESULTS:      Lab 01/05/23  1755   WBC 20.01*   HEMOGLOBIN 13.1   HEMATOCRIT 39.5   PLATELETS 367   NEUTROS ABS 17.97*   IMMATURE GRANS (ABS) 0.08*   LYMPHS ABS 0.90   MONOS  ABS 0.97*   EOS ABS 0.01   MCV 94.5   SED RATE 74*   CRP 12.67*   PROCALCITONIN 0.35*   LACTATE 1.9         Lab 01/05/23  1755   SODIUM 135*   POTASSIUM 3.6   CHLORIDE 95*   CO2 27.0   ANION GAP 13.0   BUN 16   CREATININE 0.94   EGFR 69.2   GLUCOSE 364*   CALCIUM 9.7         Lab 01/05/23  1755   TOTAL PROTEIN 8.3   ALBUMIN 4.3   GLOBULIN 4.0   ALT (SGPT) 44*   AST (SGOT) 25   BILIRUBIN 1.2   ALK PHOS 113                     Brief Urine Lab Results     None        Microbiology Results (last 10 days)     ** No results found for the last 240 hours. **          XR Foot 3+ View Right    Result Date: 1/5/2023  XR FOOT 3+ VW RIGHT Date of Exam: 1/5/2023 8:06 PM EST Indication: DM with foot ulcer; rule out gas in tissue. Comparison: None available. Findings: Postoperative changes are noted status post prior resection of the great toe at the level of the distal diaphysis of the proximal phalanx. There is soft tissue swelling within the residual soft tissues of the great toe extending into the soft tissues of the second through fifth digits and surrounding the forefoot. There is also evidence for edema extending towards the midfoot. The findings suggest changes of soft tissue cellulitis. There is suggestion of possible gas production in the soft tissues at the interdigital space between the first and second metatarsophalangeal joints and extending into the base of the residual stump of the first digit as well as the proximal aspect of the second digit. No definitive cortical erosion or destruction is seen.  There is no definitive evidence for osteomyelitis. Changes of arthropathy are seen throughout the midfoot suggesting developing neuropathic arthropathy or Charcot foot.     Impression: Impression: 1.Soft tissue swelling throughout the forefoot and midfoot. The findings suggest changes of soft tissue cellulitis. 2.There is suggestion of gas production in the soft tissues of the residual first digit and extending towards the  base of the second digit. 3.No definitive evidence for osteomyelitis. Electronically Signed: Laith Morgan  1/5/2023 8:37 PM EST  Workstation ID: [id number]          Assessment & Plan   Assessment & Plan       Sepsis (HCC)    Ulcer of right foot (HCC)    Type 2 diabetes mellitus (HCC)    Psoriatic arthritis (HCC)    Immunocompromised (HCC)    GERD (gastroesophageal reflux disease)      62 year old female presents to the ED with right foot ulcer that was noted yesterday.     1) Sepsis       Immunocompromised       Ulcer of right foot  -WBC 20.01, procal 0.35  -continue vancomycin and merrem: patient has multiple antibiotic allergies.  Unable to start clindamycin due to allergy   -blood cultures x2 pending   -consult Dr. Ortiz in am   -MRI of right foot pending   -pain control   -hold methotrexate   -wound culture     2) Diabetes mellitus type 2  -check hgb A1c   -start ldssi   -fingersticks achs     3) Psoriatic arthritis   -on methotrexate: hold for now     4) GERD  -PPI     5) Hyperlipidemia  -continue statin     DVT prophylaxis:  scds to LLE     CODE STATUS:  Full Code        Expected Discharge   TBD    This note has been completed as part of a split-shared workflow.     Signature: Electronically signed by JOEY Nguyen, 01/05/23, 11:18 PM EST.      Attending   Admission Attestation       I have performed an independent face-to-face diagnostic evaluation including performing an independent physical examination as documented here.  The documented plan of care above was reviewed and developed with the advanced practice clinician (APC).      Brief Summary Statement:   Maura Leon is a 62 y.o. female With a PMH significant for diabetes mellitus type 2, psoriatic arthritis, HLD, GERD comes to the ED due to complaints of a right foot ulcer.  Patient says she has not been consistent with checking for foot wounds during the holiday season.  Yesterday she noticed some drainage coming from her right foot, she thought  she stepped in jelly.  When she closely examined it she found that she had a new ulceration.  Today she reports redness and warmth to the area with low-grade fever, malaise.  Remainder of detailed HPI is as noted by APC and has been reviewed and/or edited by me for completeness.    Attending Physical Exam:  Temp:  [98.8 °F (37.1 °C)-99.7 °F (37.6 °C)] 98.8 °F (37.1 °C)  Heart Rate:  [] 88  Resp:  [16-20] 16  BP: (103-174)/(51-83) 128/71    Constitutional: Awake, alert  Eyes: PERRLA, sclerae anicteric, no conjunctival injection  HENT: NCAT, mucous membranes moist  Neck: Supple, no thyromegaly, no lymphadenopathy, trachea midline  Respiratory: Clear to auscultation bilaterally, nonlabored respirations   Cardiovascular: RRR, no murmurs, rubs, or gallops, palpable pedal pulses bilaterally  Gastrointestinal: Positive bowel sounds, soft, nontender, nondistended  Musculoskeletal: No bilateral ankle edema, no clubbing or cyanosis to extremities.  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      Brief Assessment/Plan :  See detailed assessment and plan developed with APC which I have reviewed and/or edited for completeness.            Yareli Griffin,   01/06/23

## 2023-01-06 NOTE — ANESTHESIA PREPROCEDURE EVALUATION
Anesthesia Evaluation     Patient summary reviewed and Nursing notes reviewed   no history of anesthetic complications:  NPO Solid Status: > 8 hours  NPO Liquid Status: > 2 hours           Airway   Mallampati: II  TM distance: >3 FB  Neck ROM: full  No difficulty expected  Dental - normal exam     Pulmonary - normal exam   (+) sleep apnea,   Cardiovascular - normal exam    (-) dysrhythmias, angina, orthopnea      Neuro/Psych  (-) seizures, CVA  GI/Hepatic/Renal/Endo    (+) morbid obesity, GERD,  diabetes mellitus type 2 poorly controlled,     Musculoskeletal     Abdominal    Substance History      OB/GYN          Other   arthritis,                      Anesthesia Plan    ASA 3     general     (Insensate, no block needed)  intravenous induction     Anesthetic plan, risks, benefits, and alternatives have been provided, discussed and informed consent has been obtained with: patient.    Use of blood products discussed with consented to blood products.   Plan discussed with CRNA.        CODE STATUS:    Level Of Support Discussed With: Patient  Code Status (Patient has no pulse and is not breathing): CPR (Attempt to Resuscitate)  Medical Interventions (Patient has pulse or is breathing): Full Support

## 2023-01-06 NOTE — NURSING NOTE
WOC consulted for wound on plantar surface of right foot.  Vascular team is already evaluated patient this morning, dressed wound and are following for management.    Nothing to add from WOC standpoint this time    Sensory Perception: 2-->very limited (numbness feet)  Moisture: 4-->rarely moist  Activity: 3-->walks occasionally  Mobility: 3-->slightly limited  Nutrition: 3-->adequate  Friction and Shear: 3-->no apparent problem  Bk Score: 18 (01/05/23 8586)    Pressure Injury Prevention Protocol (initiate for Bk Score of 18 or less):   *Keep skin dry, turn q 2 hr, keep heels elevated and offloaded with offloading heel boots.    *Apply z-guard to bottom and bony prominences daily and as needed with incontinence episodes.  *Follow C.A.R.E protocol if medical devices (Bipap, landrum, Ng tube, etc) are being used.     Sign off.    Sher Delaney RN, BSN, CCRN, CWOCN  Wound, Ostomy and Continence (WOC) Department  [facility]

## 2023-01-06 NOTE — CASE MANAGEMENT/SOCIAL WORK
Discharge Planning Assessment  [facility]     Patient Name: Maura Leon  MRN: [id number]  Today's Date: 1/6/2023    Admit Date: 1/5/2023    Plan: Probable home   Discharge Needs Assessment     Row Name 01/06/23 1439       Living Environment    People in Home spouse    Name(s) of People in Home spouse    Current Living Arrangements home    Primary Care Provided by self    Provides Primary Care For spouse    Family Caregiver if Needed child(mane), adult    Family Caregiver Names dr Coronado [phone]    Quality of Family Relationships supportive;helpful    Able to Return to Prior Arrangements yes    Living Arrangement Comments Plan is home       Resource/Environmental Concerns    Transportation Concerns none       Transition Planning    Patient/Family Anticipates Transition to home    Transportation Anticipated family or friend will provide       Discharge Needs Assessment    Equipment Currently Used at Home cpap;walker, rolling;glucometer    Concerns to be Addressed discharge planning    Equipment Needed After Discharge walker, rolling;cpap;glucometer    Discharge Coordination/Progress TBD, probable home               Discharge Plan     Row Name 01/06/23 1442       Plan    Plan Probable home    Patient/Family in Agreement with Plan yes    Plan Comments I spoke with patient at . Patient going to surgery on her toe today with . [facility] following. May need IV AB at discharge and she infused her  at home before.  Patient lives in [place]  and she is his caregiver. Patient has HH with [facility]. Patient independent at home, works FT at home for [employer]. Plan as of now is home at discharge. Paula    Final Discharge Disposition Code 01 - home or self-care              Continued Care and Services - Admitted Since 1/5/2023    Coordination has not been started for this encounter.       Expected Discharge Date and Time     Expected Discharge Date Expected Discharge Time    Jan 12, 2023           Demographic Summary     Row Name 01/06/23 1438       General Information    Admission Type inpatient    Referral Source admission list    Reason for Consult discharge planning    Preferred Language English       Contact Information    Permission Granted to Share Info With     Contact Information Obtained for     Contact Information Comments PCP: last name of [facility]               Functional Status     Row Name 01/06/23 1439       Functional Status    Usual Activity Tolerance moderate    Current Activity Tolerance fair       Functional Status, IADL    Medications independent    Meal Preparation independent    Housekeeping independent    Laundry independent    Shopping independent    IADL Comments has coverage for meds       Mental Status    General Appearance WDL WDL       Mental Status Summary    Recent Changes in Mental Status/Cognitive Functioning ability to speak clearly;ability to understand       Employment/    Employment Status employed full-time               Psychosocial    No documentation.                Abuse/Neglect    No documentation.                Legal    No documentation.                Substance Abuse    No documentation.                Patient Forms    No documentation.                   Meghann Head RN

## 2023-01-06 NOTE — ED PROVIDER NOTES
EMERGENCY DEPARTMENT ENCOUNTER    Pt Name: Maura Leon  MRN: [id number]  Pt :   1960  Room Number:  15/15  Date of encounter:  2023  PCP: Emanuel Fuentes DPM  ED Provider: JOEY Santos    Historian: Patient    HPI:  Chief Complaint: Right foot lesion        Context: Maura Leon is a 62 y.o. female who presents to the ED c/o discovery of a sore draining on the bottom of her right foot yesterday.  Patient was seen by a primary care provider today and advised to seek emergency care.  Patient has a history of distal right toe amputation in [facility] in 2022.  Patient acknowledges that she has not been vigilant to check the plantar surfaces of her feet since before the holidays.  Patient states she has multiple antibiotic allergies that complicate her medical management.  She is a type II diabetic.    Review of systems is positive for chills and feeling unwell.  No known fever.  Negative for chest pain or cough or shortness of breath.  GI and  systems are negative.  Positive for generalized weakness without orthostatic dizziness or syncope.  No neurosensory complaints or focal weakness with exception of longstanding peripheral neuropathy.      PAST MEDICAL HISTORY  No past medical history on file.      PAST SURGICAL HISTORY  No past surgical history on file.      FAMILY HISTORY  No family history on file.      SOCIAL HISTORY  Social History     Socioeconomic History   • Marital status:          ALLERGIES  Ciprofloxacin, Doxycycline, Nsaids, Sulfa antibiotics, and Tetracyclines & related        REVIEW OF SYSTEMS  Review of Systems     All systems reviewed and negative except for those discussed in HPI.       PHYSICAL EXAM    I have reviewed the triage vital signs and nursing notes.    ED Triage Vitals [23 1457]   Temp Heart Rate Resp BP SpO2   99.7 °F (37.6 °C) 118 16 174/83 96 %      Temp src Heart Rate Source Patient Position BP Location FiO2 (%)   Oral Monitor Sitting  Left arm --       Physical Exam  GENERAL:   Appears in no acute distress.  Triage tachycardia is improving.  Heart rate 100.  She is a good historian  HENT: Nares patent.  EYES: No scleral icterus.  CV: Regular rhythm, heart rate 100.  Lower extremity edema 1+.  RESPIRATORY: Normal effort.  No audible wheezes, rales or rhonchi.  ABDOMEN: Soft, nontender  MUSCULOSKELETAL: No deformities.  Patient's right foot: The distal great toe is  surgically absent and well-healed.  On the plantar surface there is a callused, blanched ulcer with a depth of approximately 0.5 cm with local erythema.  The distal half of her foot is warm with soft tissue swelling.  The remainder of her toes have brisk cap refill  NEURO: Alert, moves all extremities, follows commands.  SKIN: Warm, dry, no rash visualized.      LAB RESULTS  Recent Results (from the past 24 hour(s))   Comprehensive Metabolic Panel    Collection Time: 01/05/23  5:55 PM    Specimen: Blood   Result Value Ref Range    Glucose 364 (H) 65 - 99 mg/dL    BUN 16 8 - 23 mg/dL    Creatinine 0.94 0.57 - 1.00 mg/dL    Sodium 135 (L) 136 - 145 mmol/L    Potassium 3.6 3.5 - 5.2 mmol/L    Chloride 95 (L) 98 - 107 mmol/L    CO2 27.0 22.0 - 29.0 mmol/L    Calcium 9.7 8.6 - 10.5 mg/dL    Total Protein 8.3 6.0 - 8.5 g/dL    Albumin 4.3 3.5 - 5.2 g/dL    ALT (SGPT) 44 (H) 1 - 33 U/L    AST (SGOT) 25 1 - 32 U/L    Alkaline Phosphatase 113 39 - 117 U/L    Total Bilirubin 1.2 0.0 - 1.2 mg/dL    Globulin 4.0 gm/dL    A/G Ratio 1.1 g/dL    BUN/Creatinine Ratio 17.0 7.0 - 25.0    Anion Gap 13.0 5.0 - 15.0 mmol/L    eGFR 69.2 >60.0 mL/min/1.73   Lactic Acid, Plasma    Collection Time: 01/05/23  5:55 PM    Specimen: Blood   Result Value Ref Range    Lactate 1.9 0.5 - 2.0 mmol/L   CBC Auto Differential    Collection Time: 01/05/23  5:55 PM    Specimen: Blood   Result Value Ref Range    WBC 20.01 (H) 3.40 - 10.80 10*3/mm3    RBC 4.18 3.77 - 5.28 10*6/mm3    Hemoglobin 13.1 12.0 - 15.9 g/dL     Hematocrit 39.5 34.0 - 46.6 %    MCV 94.5 79.0 - 97.0 fL    MCH 31.3 26.6 - 33.0 pg    MCHC 33.2 31.5 - 35.7 g/dL    RDW 13.6 12.3 - 15.4 %    RDW-SD 46.4 37.0 - 54.0 fl    MPV 9.4 6.0 - 12.0 fL    Platelets 367 140 - 450 10*3/mm3    Neutrophil % 89.9 (H) 42.7 - 76.0 %    Lymphocyte % 4.5 (L) 19.6 - 45.3 %    Monocyte % 4.8 (L) 5.0 - 12.0 %    Eosinophil % 0.0 (L) 0.3 - 6.2 %    Basophil % 0.4 0.0 - 1.5 %    Immature Grans % 0.4 0.0 - 0.5 %    Neutrophils, Absolute 17.97 (H) 1.70 - 7.00 10*3/mm3    Lymphocytes, Absolute 0.90 0.70 - 3.10 10*3/mm3    Monocytes, Absolute 0.97 (H) 0.10 - 0.90 10*3/mm3    Eosinophils, Absolute 0.01 0.00 - 0.40 10*3/mm3    Basophils, Absolute 0.08 0.00 - 0.20 10*3/mm3    Immature Grans, Absolute 0.08 (H) 0.00 - 0.05 10*3/mm3    nRBC 0.0 0.0 - 0.2 /100 WBC   Green Top (Gel)    Collection Time: 01/05/23  5:55 PM   Result Value Ref Range    Extra Tube Hold for add-ons.    Lavender Top    Collection Time: 01/05/23  5:55 PM   Result Value Ref Range    Extra Tube hold for add-on    Gold Top - SST    Collection Time: 01/05/23  5:55 PM   Result Value Ref Range    Extra Tube Hold for add-ons.    Light Blue Top    Collection Time: 01/05/23  5:55 PM   Result Value Ref Range    Extra Tube Hold for add-ons.        If labs were ordered, I independently reviewed the results and considered them in treating the patient.        RADIOLOGY  XR Foot 3+ View Right    Result Date: 1/5/2023  XR FOOT 3+ VW RIGHT Date of Exam: 1/5/2023 8:06 PM EST Indication: DM with foot ulcer; rule out gas in tissue. Comparison: None available. Findings: Postoperative changes are noted status post prior resection of the great toe at the level of the distal diaphysis of the proximal phalanx. There is soft tissue swelling within the residual soft tissues of the great toe extending into the soft tissues of the second through fifth digits and surrounding the forefoot. There is also evidence for edema extending towards the midfoot.  The findings suggest changes of soft tissue cellulitis. There is suggestion of possible gas production in the soft tissues at the interdigital space between the first and second metatarsophalangeal joints and extending into the base of the residual stump of the first digit as well as the proximal aspect of the second digit. No definitive cortical erosion or destruction is seen.  There is no definitive evidence for osteomyelitis. Changes of arthropathy are seen throughout the midfoot suggesting developing neuropathic arthropathy or Charcot foot.     Impression: 1.Soft tissue swelling throughout the forefoot and midfoot. The findings suggest changes of soft tissue cellulitis. 2.There is suggestion of gas production in the soft tissues of the residual first digit and extending towards the base of the second digit. 3.No definitive evidence for osteomyelitis. Electronically Signed: Laith Morgan  1/5/2023 8:37 PM EST  Workstation ID: [id number]      PROCEDURES    Procedures    No orders to display       MEDICATIONS GIVEN IN ER    Medications   sodium chloride 0.9 % flush 10 mL (has no administration in time range)   vancomycin 2250 mg/500 mL 0.9% NS IVPB ([facility]) (has no administration in time range)   meropenem (MERREM) 1 g/100 mL 0.9% NS (mbp) (has no administration in time range)         MEDICAL DECISION MAKING, PROGRESS, and CONSULTS    All labs have been independently reviewed by me.  All radiology studies have been reviewed by me and the radiologist dictating the report.  All EKG's have been independently viewed and interpreted by me.        Orders placed during this visit:  Orders Placed This Encounter   Procedures   • Blood Culture - Blood,   • Blood Culture - Blood,   • XR Foot 3+ View Right   • Comprehensive Metabolic Panel   • Lactic Acid, Plasma   • [place] Draw   • CBC Auto Differential   • Undress & Gown   • Continuous Pulse Oximetry   • Vital Signs   • Oxygen Therapy- Nasal Cannula; 2 LPM; Titrate for SPO2:  92%, Greater Than or Equal To   • Insert Peripheral IV   • CBC & Differential   • Green Top (Gel)   • Lavender Top   • Gold Top - SST   • Gray Top   • Light Blue Top         Additional orders considered but not ordered:      ED Course:    Consultants:      ED Course as of 01/05/23 2144   u Jan 05, 2023 1951 WBC(!): 20.01 [MS]   2142 Patient's work-up is most remarkable for leukocytosis, hyperglycemia and plain imaging consistent with soft tissue swelling of cellulitis as well as the possibility of the presence of gas in the tissue.  Patient understands and concurs with inpatient plan.  She has had no hypotension.  She is slightly tachycardic.  Antibiotics been initiated. [MS]      ED Course User Index  [MS] Rosita Humphrey, JOEY                  AS OF 21:32 EST VITALS:    BP - 152/53  HR - 101  TEMP - 99.7 °F (37.6 °C) (Oral)  O2 SATS - 97%                   DIAGNOSIS  Final diagnoses:   Cellulitis of right foot   History of diabetes mellitus, type II         DISPOSITION    Admitted         Please note that portions of this document were completed with voice recognition software.      Rosita Humphrey, JOEY  01/05/23 1674

## 2023-01-06 NOTE — OP NOTE
OP NOTE    Patient Name:  Maura Leon    Date of Surgery: January 6, 2023      Pre-op Diagnosis: Gas gangrene of the right foot involving the plantar surface of the right second toe metatarsal head area    Post-op Diagnosis:   Same with extensive necrotizing fasciitis of the right second toe involving the flexor and extensor tendons.    Surgeon(s):  Javon Raygoza MD      Assistant(s): Assistant: Pollo Torres PA-C  Assistant: Pollo Torres PA-C  was responsible for performing the following activities: Retraction, Suction, Irrigation and Placing Dressing and their skilled assistance was necessary for the success of this case.    Anesthesia: Intravenous induction of general LMA anesthesia    Indications: The patient 62-year-old female known longstanding diabetic with severe diabetic neuropathy who developed drainage on her sock 2 days ago and discovered that she had a right foot ulceration on the plantar surface in the area of the right second toe metatarsal head area.  She went to her podiatrist who after evaluation centered medially to the emergency department here at [facility].  In the emergency department she was noted to have swelling of the right foot with erythema and had a low-grade temperature 99.  Blood studies showed white blood cell count of 20,000 with elevated inflammatory markers of CRP and sed rate.  Of note is the fact that the patient is on methotrexate for her psoriatic arthritis.  She was admitted to the hospital placed on appropriate intravenous antibiotics and I was consulted this morning.  Of note is the fact she had an MRI as well as 3 views of the right foot done through the emergency department.  The MRI did not show any definitive osteomyelitis but there appeared to be some gas in the soft tissues in the area of the right second toe metatarsal head area.  Dr. Jon Abdul of infectious disease saw her this morning and we discussed the situation and felt as though  that she needed exploration of this plantar surface ulceration and most likely would need a right second toe transmetatarsal amputation.  This was discussed with the patient in the preop area.  She understood the need for this procedure and agreed.  I told her there is a risk in the preop area of bleeding, stroke, heart attack, death, possible postop phantom pain if amputation had to be performed, and the possible need for further surgical procedures of this right foot  Procedure(s): Right second toe transmetatarsal amputation through the distal third of the second metatarsal bone with extensive sharp soft tissue debridement.  Wound left open to heal by secondary intent/wound VAC therapy.      The patient was taken the operating suite underwent successful intravenous induction of general LMA anesthesia.  Timeout was called to verify the surgery to be performed: Right plantar surface ulceration exploration and probable right second toe transmetatarsal amputation.  The right foot and the right lower extremity was prepped to the knee circumferentially with ChloraPrep after allowing a 3-minute drying time and was sterilely draped.  Circumferential incision was initially made around the plantar surface ulceration carried into the deep soft tissues where  there was cloudy fluid present.  Swabs of the fluid was sent for aerobic and anaerobic culture and gram stain.  The flexor and extensor tendons were involved with a necrotizing fasciitis.  At this point in time the incision was carried around circumferentially the base of the second toe up onto the dorsum of the foot extending onto the distal third of the metatarsal bone.  There was necrotizing fasciitis and soft tissue necrosis surrounding the metatarsal head of the second toe.  Wide sharp excisional debridement carried out with the Bovie cautery.  Back to the most distal third of the metatarsal bone all this debrided tissue was removed and the distal third of the  metatarsal bone was then transected in oblique fashion and removed from the operative field.  There was some extension of this  necrotizing fasciitis onto the dorsum of the foot medially to involve soft tissue of the right great toe and laterally to involve the soft tissue of the right third toe.,  However, the necrotizing soft tissue process did not appear to penetrate down to the osseous structures of the great toe or the third toe.  Once all the necrotic tissue as well as  the flexor and extensor tendons to this right second toe had been excised the open surgical wound was irrigated with 450 mL of Irrisept antibiotic solution.  During this dissection hemostasis was controlled with the Bovie cautery.  There was excellent arterial flow within viable soft  tissue once the necrotic soft tissue had been excised.  The extent of the sharp tissue debridement was approximately 6 cm in greatest length and 4 cm in greatest width and to a depth of approximately 3 cm.  This open surgical wound was then packed with Irrisept soaked 4 x 4's followed by dry 4 x 4's between the toes a Kerlix wrap and #8 Spandage tube net dressing to hold the Kerlix in place.  The patient is awakened from their LMA anesthesia and taken recovery area stable vital signs.  Sponge and needle count were correct x2.  A portion of the necrotic soft tissue was sent for aerobic and anaerobic culture and gram stain.  The right second toe was sent for pathological review and separately the distal third of the right second toe metatarsal bone was sent for pathology review.    Estimated Blood Loss: Approximately 10 mL    Findings: A necrotizing fasciitis and necrotizing soft tissue process involving the plantar surface of the right second toe down to the right second toe metatarsal head area.  Once the necrotic soft tissue had been removed there was excellent arterial flow within the soft tissues    Complications: No immediate complications occurred      Javon  ABHISHEK Raygoza MD     Date: 1/6/2023 Time: 18:14 EST

## 2023-01-06 NOTE — CONSULTS
Cardiothoracic Surgery History & Physical           Chief complaint: right foot wound    HPI:   Ms. Maura Leon is a 63yo female with PMH of diabetes, GERD, arthritis who presented to the ED last night with complaints of a right foot wound. She initially presented to her PCP who advised her to come to the ED. She had been experiencing low grade temperature and body aches and noted swelling and redness around the site of the ulceration. She has undergone previous right toe amputation.  Currently, patient reports that she has no feeling to her feet. But she sometimes experiences burning sensation on the tips of her toes. She does not recall how she developed the ulceration, whether she may have stepped on glass or other foreign body. She reports that her A1c is actually improved since last check. She is not a smoker.       Past Medical History:   Diagnosis Date   • Arthritis    • Arthritis    • Diabetes mellitus (HCC)    • GERD (gastroesophageal reflux disease)    • Irritable bowel    • Sleep apnea      Past Surgical History:   Procedure Laterality Date   • TOE SURGERY Right 06/30/2022     Family History   Problem Relation Age of Onset   • Diabetes Mother    • Osteoporosis Mother    • Hypertension Father      Social History     Tobacco Use   • Smoking status: Never   • Smokeless tobacco: Never   Substance Use Topics   • Alcohol use: Yes     Alcohol/week: 1.0 standard drink     Types: 1 Glasses of wine per week   • Drug use: Never       Medications Prior to Admission   Medication Sig Dispense Refill Last Dose   • aspirin 81 MG chewable tablet Chew 81 mg Daily.      • atorvastatin (LIPITOR) 10 MG tablet Take 10 mg by mouth Daily.      • betamethasone, augmented, (DIPROLENE) 0.05 % cream Apply 1 application topically to the appropriate area as directed 2 (Two) Times a Day.      • Biotin 93508 MCG tablet Take  by mouth.      • calcium carbonate (OS-SUSAN) 600 MG tablet Take 600 mg by mouth Daily.      • dexlansoprazole  (DEXILANT) 60 MG capsule Take 60 mg by mouth Daily.      • fluocinolone 0.01 % cream Apply 1 application topically to the appropriate area as directed 2 (Two) Times a Day.      • folic acid (FOLVITE) 0.5 MG half tablet Take 0.5 mg by mouth Daily.      • hydroCHLOROthiazide (HYDRODIURIL) 25 MG tablet Take 50 mg by mouth Daily.      • Insulin Degludec (TRESIBA SC) Inject  under the skin into the appropriate area as directed.      • Loratadine (Claritin) 10 MG capsule Take  by mouth.      • metFORMIN (GLUCOPHAGE) 500 MG tablet Take 500 mg by mouth 2 (Two) Times a Day With Meals.      • Minoxidil 5 % solution Apply  topically.      • MOMETASONE FUROATE EX Apply  topically.      • nystatin 291622 UNIT/GM powder Apply  topically to the appropriate area as directed 4 (Four) Times a Day.      • potassium citrate (UROCIT-K) 10 MEQ (1080 MG) CR tablet Take 10 mEq by mouth 3 (Three) Times a Day With Meals.      • selenium sulfide (SELSUN) 2.5 % lotion Apply  topically to the appropriate area as directed Daily As Needed for Dandruff.      • Semaglutide,0.25 or 0.5MG/DOS, (OZEMPIC) 2 MG/1.5ML solution pen-injector Inject  under the skin into the appropriate area as directed 1 (One) Time Per Week.      • Turmeric 500 MG capsule Take 1,000 mg by mouth.      • vitamin C (ASCORBIC ACID) 250 MG tablet Take 500 mg by mouth Daily.      • vitamin D (ERGOCALCIFEROL) 1.25 MG (98947 UT) capsule capsule Take 50,000 Units by mouth 1 (One) Time Per Week.      • vitamin D3 125 MCG (5000 UT) capsule capsule Take 5,000 Units by mouth Daily.      • Zinc 25 MG tablet Take  by mouth.        Allergies:  Ciprofloxacin, Doxycycline, Nsaids, Sulfa antibiotics, and Tetracyclines & related    Review of Systems:  A comprehensive review of systems was negative except for:     All other systems were reviewed and were negative.    Vital Signs:  Temp:  [98.8 °F (37.1 °C)-99.7 °F (37.6 °C)] 98.8 °F (37.1 °C)  Heart Rate:  [] 88  Resp:  [16-20] 18  BP:  (103-174)/(50-83) 104/50    Physical Exam:  Physical Exam  Vitals and nursing note reviewed.   HENT:      Head: Normocephalic and atraumatic.   Cardiovascular:      Rate and Rhythm: Normal rate and regular rhythm.      Pulses: Normal pulses.           Dorsalis pedis pulses are 2+ on the right side and 2+ on the left side.        Posterior tibial pulses are 2+ on the right side and 2+ on the left side.      Heart sounds: Normal heart sounds.   Pulmonary:      Effort: Pulmonary effort is normal.      Breath sounds: Normal breath sounds.   Abdominal:      General: Abdomen is flat.      Palpations: Abdomen is soft.   Musculoskeletal:        Feet:    Feet:      Left foot:      Skin integrity: Skin integrity normal.   Skin:     General: Skin is warm and dry.      Capillary Refill: Capillary refill takes less than 2 seconds.   Neurological:      Mental Status: She is oriented to person, place, and time.   Psychiatric:         Mood and Affect: Mood normal.         Behavior: Behavior normal.         Labs:  Results from last 7 days   Lab Units 01/06/23  0411   WBC 10*3/mm3 16.19*   HEMOGLOBIN g/dL 11.5*   HEMATOCRIT % 34.2   PLATELETS 10*3/mm3 287     Results from last 7 days   Lab Units 01/06/23  0411   SODIUM mmol/L 137   POTASSIUM mmol/L 3.1*   CHLORIDE mmol/L 99   CO2 mmol/L 27.0   BUN mg/dL 14   CREATININE mg/dL 0.77   GLUCOSE mg/dL 189*   CALCIUM mg/dL 9.3         Coagulation: No results found for: INR, APTT  Cardiac markers:     ABGs:       Invalid input(s): PO2    Imaging:   XR FOOT 3+ VW RIGHT     Date of Exam: 1/5/2023 8:06 PM EST     Indication: DM with foot ulcer; rule out gas in tissue.     Comparison: None available.     Findings:  Postoperative changes are noted status post prior resection of the great toe at the level of the distal diaphysis of the proximal phalanx. There is soft tissue swelling within the residual soft tissues of the great toe extending into the soft tissues of   the second through fifth  digits and surrounding the forefoot. There is also evidence for edema extending towards the midfoot. The findings suggest changes of soft tissue cellulitis. There is suggestion of possible gas production in the soft tissues at   the interdigital space between the first and second metatarsophalangeal joints and extending into the base of the residual stump of the first digit as well as the proximal aspect of the second digit. No definitive cortical erosion or destruction is seen.   There is no definitive evidence for osteomyelitis. Changes of arthropathy are seen throughout the midfoot suggesting developing neuropathic arthropathy or Charcot foot.     IMPRESSION:  Impression:  1.Soft tissue swelling throughout the forefoot and midfoot. The findings suggest changes of soft tissue cellulitis.  2.There is suggestion of gas production in the soft tissues of the residual first digit and extending towards the base of the second digit.  3.No definitive evidence for osteomyelitis.     Electronically Signed: Laith Morgan    1/5/2023 8:37 PM EST    Workstation ID: [id number]    MRI OF THE RIGHT FOOT WITH AND WITHOUT INTRAVENOUS CONTRAST     HISTORY: Right forefoot pain.     TECHNIQUE: Multiplanar and multisequence MR imaging of the right forefoot was performed without the administration of intravenous gadolinium. Imaging sequences include axial and sagittal T1, axial and sagittal proton density, coronal and sagittal   fat-suppressed proton density, and axial fat-suppressed T2-weighted images. 20 mL of MultiHance was administered.     COMPARISON: None.     FINDINGS:     There is a large amount of diffuse soft tissue swelling of the forefoot that could be a mixture of cellulitis and dependent edema. There is a wound/ulceration over the plantar aspect of the second metatarsal phalangeal joint with some necrosis and poor   enhancement of the plantar soft tissues at this site. There is no abscess there is no soft tissue gas. No  evidence of osteomyelitis. Partial amputation of the great toe.           IMPRESSION:  1. No abscess. No osteomyelitis.  2. Plantar wound/ulceration over the second metatarsal phalangeal joint with some adjacent soft tissue necrosis an inflammatory phlegmon but no well-defined fluid collection.  3. Diffuse soft tissue swelling that could be a mixture of cellulitis and dependent edema.     Electronically signed by:  Burke Evans M.D.    1/6/2023 2:11 AM Mountain Time    Assessment:   Patient Active Problem List   Diagnosis   • Sepsis (HCC)   • Ulcer of right foot (HCC)   • Type 2 diabetes mellitus (HCC)   • Psoriatic arthritis (HCC)   • Immunocompromised (HCC)   • GERD (gastroesophageal reflux disease)       Plan:   IV antibiotics per Infectious Disease   MRI right foot showed no osteomyelitis, however will discuss findings with Dr. Raygoza concern for gas gangrene   NPO      Ester Silverman PA-C  01/06/23  08:09 EST

## 2023-01-06 NOTE — ANESTHESIA POSTPROCEDURE EVALUATION
Patient: Maura Leon    Procedure Summary     Date: 01/06/23 Room / [facility]:  [facility]    Anesthesia Start: 1711 Anesthesia Stop: 1808    Procedure: RIGHT FOOT SURGICAL EXPLORATION WITH POSSIBLE TOE AMPUTATION (Right: Foot) Diagnosis:       Cellulitis of right foot      (Cellulitis of right foot [L03.115])    Surgeons: Javon Raygoza MD Provider: Kentrell Null MD    Anesthesia Type: general ASA Status: 3          Anesthesia Type: general    Vitals  Vitals Value Taken Time   BP     Temp     Pulse     Resp     SpO2 99 % 01/06/23 1808           Post Anesthesia Care and Evaluation    Patient location during evaluation: PACU  Patient participation: complete - patient participated  Level of consciousness: awake and alert  Pain management: adequate    Airway patency: patent  Anesthetic complications: No anesthetic complications  PONV Status: none  Cardiovascular status: hemodynamically stable and acceptable  Respiratory status: nonlabored ventilation, acceptable and nasal cannula  Hydration status: acceptable

## 2023-01-06 NOTE — CONSULTS
Pharmacy Consult-Vancomycin Dosing  Maura Leon is a  62 y.o. female receiving vancomycin therapy.     Indication: SSTI, Sepsis  Consulting Provider: Hospitalist  ID Consult:     Goal AUC: 400 - 600 mg/L*hr    Current Antimicrobial Therapy  Anti-Infectives (From admission, onward)      Ordered     Dose/Rate Route Frequency Start Stop    01/06/23 0500  vancomycin in dextrose 5% 150 mL (VANCOCIN) IVPB 750 mg        Ordering Provider: Dereje Vega IV, PharmD    750 mg  over 60 Minutes Intravenous Every 12 Hours 01/06/23 1200 01/11/23 1159    01/06/23 0024  meropenem (MERREM) 1 g/100 mL 0.9% NS (mbp)        Ordering Provider: Giuliana Mckeon APRN    1 g  over 3 Hours Intravenous Every 8 Hours 01/06/23 0400 01/10/23 0359    01/06/23 0024  Pharmacy to dose vancomycin        Ordering Provider: Giuliana Mckeon APRN     Does not apply Continuous PRN 01/06/23 0024 01/11/23 0023    01/05/23 2047  vancomycin 2250 mg/500 mL 0.9% NS IVPB ([facility])        Ordering Provider: Rosita Humphrey APRN    20 mg/kg × 109 kg  over 2.5 Hours Intravenous Once 01/05/23 2230 01/06/23 0303    01/05/23 2132  meropenem (MERREM) 1 g/100 mL 0.9% NS (mbp)        Ordering Provider: Rosita Humphrey APRN    1 g  over 30 Minutes Intravenous Once 01/05/23 2200 01/05/23 2244            Allergies  Allergies as of 01/05/2023 - Reviewed 01/05/2023   Allergen Reaction Noted    Ciprofloxacin Provider Review Needed 01/05/2023    Doxycycline Provider Review Needed 01/05/2023    Nsaids Provider Review Needed 01/05/2023    Sulfa antibiotics Provider Review Needed 01/05/2023    Tetracyclines & related Provider Review Needed 01/05/2023       Labs    Results from last 7 days   Lab Units 01/05/23  1755   BUN mg/dL 16   CREATININE mg/dL 0.94       Results from last 7 days   Lab Units 01/05/23  1755   WBC 10*3/mm3 20.01*       Evaluation of Dosing     Last Dose Received in the ED/Outside Facility: N/A  Is Patient on Dialysis or Renal Replacement: N    Ht  "- 167.6 cm (66\")  Wt - 116 kg (255 lb)    Estimated Creatinine Clearance: 81.4 mL/min (by C-G formula based on SCr of 0.94 mg/dL).    Intake & Output (last 3 days)         01/03 0701  01/04 0700 01/04 0701  01/05 0700 01/05 0701 01/06 0700    IV Piggyback   1100    Total Intake(mL/kg)   1100 (9.5)    Net   +1100           Urine Unmeasured Occurrence   2 x            Microbiology and Radiology  Microbiology Results (last 10 days)       ** No results found for the last 240 hours. **            Reported Vancomycin Levels                         [facility] AUC Calculation:    Current AUC:    mg/L*hr    Predicted Steady State AUC on Current Dose:  mg/L*hr  _________________________________    Predicted Steady State AUC on New Dose:   492 mg/L*hr    Assessment/Plan:    Moderate age morbidly obese female with normal renal markers.  2250 mg IV vancomycin load.  Start 750 mg IV q12h at noon.  Random level tomorrow with AM labs.  MRSA PCR per protocol.    Thank you for this consult.  Dereje Vega IV, PharmD, BCPS  1/6/2023  05:00 EST    "

## 2023-01-06 NOTE — CONSULTS
Maura Leon  1960  [id number]    Date of Consult: 1/6/2023    Admit Date:  1/5/2023      Requesting Provider: No ref. provider found  Evaluating Physician: Kevin Abdul MD    Chief Complaint:  Right foot infection    Reason for Consultation: right foot infection    History of present illness:     Patient is a 62 y.o.  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  Follows with podiatry in [place]; she has diabetes with chronic sensory neuropathy and has not noticed any recent exposures.  No specific blunt force or penetrating trauma.  She has not stepped on anything she knows of.  No animal insect or arthropod bite.  No fresh/brackish/salt water exposure.  No prior history MRSA VRE C. difficile or ESBL/KP C/CRE organisms.    She is admitted on January 5, 2023 with acute deterioration at the right foot.  She had just noticed a wound on the plantar side of her foot when she had spontaneous drainage and does not know how long it had been there.  She has no pain because of her sensory neuropathy.  She developed acute redness/swelling and was told to come to the emergency room by her podiatrist.  She was noted to have fever/leukocytosis with elevated procalcitonin, sepsis per admission H&P and abnormal MRI with plantar foot wound/ulceration over the second MTP joint with adjacent soft tissue and inflammatory phlegmon but no focal fluid collection per radiology and no osteomyelitis per radiology.  Initial x-ray did raise concern for gas in the soft tissue per radiology.  Dr. Raygoza has seen the patient and he is planning for surgical debridement on January 6    She has no pain at the right foot with her sensory neuropathy.  She has redness/swelling unclear duration exactly but evolving over days.  She denies any other specific  focal complaints    No headache photophobia or neck stiffness.  No shortness of breath cough or hemoptysis.  No nausea vomiting diarrhea or abdominal pain.  No dysuria hematuria or pyuria.  No skin rash    Past Medical History:   Diagnosis Date   • Arthritis    • Arthritis    • Diabetes mellitus (HCC)    • GERD (gastroesophageal reflux disease)    • Irritable bowel    • Sleep apnea        Past Surgical History:   Procedure Laterality Date   • TOE SURGERY Right 06/30/2022       Pediatric History   Patient Parents   • Not on file     Other Topics Concern   • Not on file   Social History Narrative   • Not on file       family history includes Diabetes in her mother; Hypertension in her father; Osteoporosis in her mother.    Allergies   Allergen Reactions   • Ciprofloxacin Provider Review Needed   • Clindamycin/Lincomycin Hives   • Doxycycline Provider Review Needed   • Nsaids Provider Review Needed   • Sulfa Antibiotics Provider Review Needed   • Tetracyclines & Related Provider Review Needed   She believes she has tolerated penicillins previously.  She did get prior diarrhea on Keflex but no other reaction to that medication    Medication:  Current Facility-Administered Medications   Medication Dose Route Frequency Provider Last Rate Last Admin   • acetaminophen (TYLENOL) tablet 650 mg  650 mg Oral Q4H PRN Mike, Giuliana, APRN        Or   • acetaminophen (TYLENOL) 160 MG/5ML solution 650 mg  650 mg Oral Q4H PRN Mike, Giuliana, APRN        Or   • acetaminophen (TYLENOL) suppository 650 mg  650 mg Rectal Q4H PRN Mike, Giuliana, APRN       • aspirin chewable tablet 81 mg  81 mg Oral Daily Mike, Giuliana, APRN       • atorvastatin (LIPITOR) tablet 10 mg  10 mg Oral Daily Mike, Giuliana, APRN       • betamethasone (augmented) (DIPROLENE) 0.05 % cream 1 application  1 application Topical BID Mike, Giuliana, APRN       • Calcium Carb-Cholecalciferol 600-20 MG-MCG tablet 1 tablet  1 tablet Oral Daily Mike,  Giuliana, APRN       • cetirizine (zyrTEC) tablet 5 mg  5 mg Oral Daily Mike, Giuliana, APRN       • DAPTOmycin (CUBICIN) 500 mg in sodium chloride 0.9 % 50 mL IVPB  6 mg/kg (Adjusted) Intravenous Q24H Kevin Abdul MD       • dextrose (D50W) (25 g/50 mL) IV injection 25 g  25 g Intravenous Q15 Min PRN Mike, Giuliana, APRN       • dextrose (GLUTOSE) oral gel 15 g  15 g Oral Q15 Min PRN Mike, Giuliana, APRN       • folic acid (FOLVITE) tablet 0.5 mg  0.5 mg Oral Daily Mike, Giuliana, APRN       • glucagon (human recombinant) (GLUCAGEN DIAGNOSTIC) injection 1 mg  1 mg Intramuscular Q15 Min PRN Mike, Giuliana, APRN       • hydroCHLOROthiazide (HYDRODIURIL) tablet 50 mg  50 mg Oral Daily Mike, Giuliana, APRN       • Insulin Lispro (humaLOG) injection 0-7 Units  0-7 Units Subcutaneous 4x Daily With Meals & Nightly Mike, Giuliana, APRN   2 Units at 01/06/23 0937   • nystatin (MYCOSTATIN) powder   Topical Q8H Mike, Giuliana, APRN       • pantoprazole (PROTONIX) EC tablet 40 mg  40 mg Oral Daily Dereje Vega IV, PharmD       • Pharmacy to dose vancomycin   Does not apply Continuous PRN Mike, Giuliana, APRN       • piperacillin-tazobactam (ZOSYN) 3.375 g in iso-osmotic dextrose 50 ml (premix)  3.375 g Intravenous Once Kevin Abdul MD       • piperacillin-tazobactam (ZOSYN) 3.375 g in iso-osmotic dextrose 50 ml (premix)  3.375 g Intravenous Q8H Kevin Abdul MD       • potassium chloride (MICRO-K) CR capsule 40 mEq  40 mEq Oral PRN Jazmin Cotton MD   40 mEq at 01/06/23 0921    Or   • potassium chloride (KLOR-CON) packet 40 mEq  40 mEq Oral PRN Jazmin Cotton MD        Or   • potassium chloride 10 mEq in 100 mL IVPB  10 mEq Intravenous Q1H PRN Jazmin Cotton MD       • sodium chloride 0.9 % flush 10 mL  10 mL Intravenous PRN Eder Leach DO       • sodium chloride 0.9 % flush 10 mL  10 mL Intravenous Q12H Giuliana Mckeon APRN   10 mL at 01/06/23 0034   • sodium  chloride 0.9 % flush 10 mL  10 mL Intravenous PRN Giuliana Mckeon APRN       • sodium chloride 0.9 % infusion 40 mL  40 mL Intravenous PRN Giuliana Mckeon APRN       • vitamin D3 capsule 5,000 Units  5,000 Units Oral Daily Giuliana Mckeon APRN           Antibiotics:  Anti-Infectives (From admission, onward)    Ordered     Dose/Rate Route Frequency Start Stop    01/06/23 1026  piperacillin-tazobactam (ZOSYN) 3.375 g in iso-osmotic dextrose 50 ml (premix)        Ordering Provider: Kevin Abdul MD    3.375 g  over 4 Hours Intravenous Every 8 Hours 01/06/23 1715 01/13/23 1714    01/06/23 1026  piperacillin-tazobactam (ZOSYN) 3.375 g in iso-osmotic dextrose 50 ml (premix)        Ordering Provider: Kevin Abdul MD    3.375 g  over 30 Minutes Intravenous Once 01/06/23 1115      01/06/23 1026  DAPTOmycin (CUBICIN) 500 mg in sodium chloride 0.9 % 50 mL IVPB        Ordering Provider: Kevin Abdul MD    6 mg/kg × 82 kg (Adjusted)  100 mL/hr over 30 Minutes Intravenous Every 24 Hours 01/06/23 1115 01/13/23 1114    01/06/23 0024  Pharmacy to dose vancomycin        Ordering Provider: Giuliana Mckeon APRN     Does not apply Continuous PRN 01/06/23 0024 01/11/23 0023    01/05/23 2047  vancomycin 2250 mg/500 mL 0.9% NS IVPB ([facility])        Ordering Provider: Rosita Humphrey APRN    20 mg/kg × 109 kg  over 2.5 Hours Intravenous Once 01/05/23 2230 01/06/23 0303    01/05/23 2132  meropenem (MERREM) 1 g/100 mL 0.9% NS (mbp)        Ordering Provider: Rosita Humphrey APRN    1 g  over 30 Minutes Intravenous Once 01/05/23 2200 01/05/23 2244            Review of Systems    Constitutional-- No  chills or sweats.  Appetite diminished with fatigue.  Heent-- No new vision, hearing or throat complaints.  No epistaxis or oral sores.  Denies odynophagia or dysphagia.  No flashers, floaters or eye pain. No odynophagia or dysphagia. No headache, photophobia or neck stiffness.  CV-- No chest pain, palpitation or  "syncope  Resp-- No SOB/cough/Hemoptysis  GI- No nausea, vomiting, or diarrhea.  No hematochezia, melena, or hematemesis. Denies jaundice or chronic liver disease.  -- No dysuria, hematuria, or flank pain.  Denies hesitancy, urgency or flank pain.  Lymph- no swollen lymph nodes in neck/axilla or groin.   Heme- No active bruising or bleeding; no Hx of DVT or PE.  MS-- no acute swelling or pain in the bones or joints of arms/legs aside from above.  No new back pain.  Neuro-- No acute focal weakness or numbness in the arms or legs.  No seizures.    Full 12 point review of systems reviewed and negative otherwise for acute complaints, except for above    Physical Exam: Nursing/chaperone present  Vital Signs   /50 (BP Location: Right arm, Patient Position: Lying)   Pulse 88   Temp 98.8 °F (37.1 °C) (Oral)   Resp 18   Ht 167.6 cm (66\")   Wt 116 kg (255 lb)   SpO2 100%   BMI 41.16 kg/m²     GENERAL: Awake and alert, in no acute distress.   HEENT: Normocephalic, atraumatic.  PERRL. EOMI. No conjunctival injection. No icterus. Oropharynx clear without evidence of thrush or exudate. No evidence of peridontal disease.    NECK: Supple without nuchal rigidity. No mass.  LYMPH: No cervical, axillary or inguinal lymphadenopathy.  HEART: RRR; No murmur, rubs, gallops.   LUNGS: Clear to auscultation bilaterally without wheezing, rales, rhonchi. Normal respiratory effort. Nonlabored. No dullness.  ABDOMEN: Soft, nontender, nondistended. Positive bowel sounds. No rebound or guarding. NO mass or HSM.  EXT:  No cyanosis, clubbing . No cord.  See below  : Genitalia generally unremarkable.  Without Irizarry catheter.  MSK: FROM without joint effusions noted arms/legs.    SKIN: Warm and dry without cutaneous eruptions on Inspection/palpation.  See below  NEURO: Oriented to PPT. No focal deficits on motor/sensory exam at arms/legs.  PSYCHIATRIC: Normal insight and judgement. Cooperative with PE    Right foot with diffuse " erythema/warmth and induration distally both dorsal and plantar.  Distal plantar foot near the second MTP region with open wound at least through the dermis.  I am unable to probe to bone tendon joint or ligament at present.  No discrete mass bulge or fluctuance.  No crepitus or bulla.  Partial right hallux amputation noted with healed surgical site.  Onychomycosis noted with thickened nails    No peripheral stigmata/phenomena of endocarditis    IV without obvious redness or drainage    Laboratory Data    Results from last 7 days   Lab Units 01/06/23 0411 01/05/23  1755   WBC 10*3/mm3 16.19* 20.01*   HEMOGLOBIN g/dL 11.5* 13.1   HEMATOCRIT % 34.2 39.5   PLATELETS 10*3/mm3 287 367     Results from last 7 days   Lab Units 01/06/23  0411   SODIUM mmol/L 137   POTASSIUM mmol/L 3.1*   CHLORIDE mmol/L 99   CO2 mmol/L 27.0   BUN mg/dL 14   CREATININE mg/dL 0.77   GLUCOSE mg/dL 189*   CALCIUM mg/dL 9.3     Results from last 7 days   Lab Units 01/05/23  1755   ALK PHOS U/L 113   BILIRUBIN mg/dL 1.2   ALT (SGPT) U/L 44*   AST (SGOT) U/L 25     Results from last 7 days   Lab Units 01/05/23  1755   SED RATE mm/hr 74*     Results from last 7 days   Lab Units 01/05/23  1755   CRP mg/dL 12.67*       Estimated Creatinine Clearance: 99.3 mL/min (by C-G formula based on SCr of 0.77 mg/dL).      Microbiology:      Radiology:  Imaging Results (Last 72 Hours)     Procedure Component Value Units Date/Time    MRI Foot Right With & Without Contrast [[id number]] Collected: 01/06/23 0209     Updated: 01/06/23 0413    Narrative:      MRI OF THE RIGHT FOOT WITH AND WITHOUT INTRAVENOUS CONTRAST    HISTORY: Right forefoot pain.    TECHNIQUE: Multiplanar and multisequence MR imaging of the right forefoot was performed without the administration of intravenous gadolinium. Imaging sequences include axial and sagittal T1, axial and sagittal proton density, coronal and sagittal   fat-suppressed proton density, and axial fat-suppressed T2-weighted  images. 20 mL of MultiHance was administered.    COMPARISON: None.    FINDINGS:    There is a large amount of diffuse soft tissue swelling of the forefoot that could be a mixture of cellulitis and dependent edema. There is a wound/ulceration over the plantar aspect of the second metatarsal phalangeal joint with some necrosis and poor   enhancement of the plantar soft tissues at this site. There is no abscess there is no soft tissue gas. No evidence of osteomyelitis. Partial amputation of the great toe.          Impression:      1. No abscess. No osteomyelitis.  2. Plantar wound/ulceration over the second metatarsal phalangeal joint with some adjacent soft tissue necrosis an inflammatory phlegmon but no well-defined fluid collection.  3. Diffuse soft tissue swelling that could be a mixture of cellulitis and dependent edema.    Electronically signed by:  Burke Evans M.D.    1/6/2023 2:11 AM Mountain Time    XR Foot 3+ View Right [[id number]] Collected: 01/05/23 2033     Updated: 01/05/23 2039    Narrative:      XR FOOT 3+ VW RIGHT    Date of Exam: 1/5/2023 8:06 PM EST    Indication: DM with foot ulcer; rule out gas in tissue.    Comparison: None available.    Findings:  Postoperative changes are noted status post prior resection of the great toe at the level of the distal diaphysis of the proximal phalanx. There is soft tissue swelling within the residual soft tissues of the great toe extending into the soft tissues of   the second through fifth digits and surrounding the forefoot. There is also evidence for edema extending towards the midfoot. The findings suggest changes of soft tissue cellulitis. There is suggestion of possible gas production in the soft tissues at   the interdigital space between the first and second metatarsophalangeal joints and extending into the base of the residual stump of the first digit as well as the proximal aspect of the second digit. No definitive cortical erosion or destruction  is seen.   There is no definitive evidence for osteomyelitis. Changes of arthropathy are seen throughout the midfoot suggesting developing neuropathic arthropathy or Charcot foot.      Impression:      Impression:  1.Soft tissue swelling throughout the forefoot and midfoot. The findings suggest changes of soft tissue cellulitis.  2.There is suggestion of gas production in the soft tissues of the residual first digit and extending towards the base of the second digit.  3.No definitive evidence for osteomyelitis.    Electronically Signed: Laith Morgan    1/5/2023 8:37 PM EST    Workstation ID: [id number]            Impression:     --Acute sepsis present on admission per medicine notes, in the setting of chronic immunosuppression/psoriatic arthritis and acute deterioration in her right foot with right foot infection most likely source.  Significant leukocytosis with abnormal procalcitonin and abnormal imaging.  Dr. Raygoza has seen the patient with plans for surgical debridement and high risk for further serious morbidity and other serious sequela.  She understands the risk for persistent/recurrent or nonhealing wounds, persistent/progressive or recurrent infection and risk for further functional/limb loss, higher level amputations etc.  She knows antibiotics and surgery not a guarantee for cure.  Timing/option of threshold including extent of debridement/amputation at discretion of Dr. Raygoza; he is making urgent plans    -- Psoriatic arthritis with chronic methotrexate, currently on hold by medicine team.    -- Diabetes with chronic sensory neuropathy.  She understands the importance of protecting her feet.  You need to tightly control blood sugar to give best chance for healing    -- Antibiotic allergies as above to clindamycin, doxycycline, sulfa and fluoroquinolones      PLAN: Thank you for asking us to see Maura Leon, I recommend the following:    -- IV daptomycin/Zosyn    -- Check/review labs cultures and  scans    -- Partial history per nursing staff    -- Discussed with microbiology    -- Highly complex set of issues with high risk for further serious morbidity and other serious sequela    -- Discussed with Dr. Raygoza.  He is making surgical plans       Kevin Abdul MD  1/6/2023

## 2023-01-06 NOTE — PROGRESS NOTES
[facility] Medicine Services  PROGRESS NOTE    Patient Name: Maura Leon  : 1960  MRN: [id number]    Date of Admission: 2023  Primary Care Physician: Emanuel Fuentes DPM    Subjective   Subjective     CC:  Diabetic ulcer    HPI:  Patient denies any pain in her foot due to her neuropathy.  Had low-grade fevers overnight.  States that she absolutely does not want an amputation and wants to hold off on surgery until she can think about it.      Objective   Objective     Vital Signs:   Temp:  [98.8 °F (37.1 °C)-99.7 °F (37.6 °C)] 98.8 °F (37.1 °C)  Heart Rate:  [] 88  Resp:  [16-20] 18  BP: (103-174)/(50-83) 104/50     Physical Exam:  Constitutional: No acute distress, awake, alert  Respiratory: Clear to auscultation bilaterally, respiratory effort normal on room air  Cardiovascular: RRR, no murmurs, rubs, or gallops  Gastrointestinal: Positive bowel sounds, soft, nontender, nondistended  Musculoskeletal: No bilateral ankle edema  Psychiatric: Appropriate affect, cooperative  Neurologic: Oriented x 3, strength symmetric in all extremities, Cranial Nerves grossly intact to confrontation, speech clear  Skin: RLE in dressings      Results Reviewed:  LAB RESULTS:      Lab 23  1755   WBC 16.19* 20.01*   HEMOGLOBIN 11.5* 13.1   HEMATOCRIT 34.2 39.5   PLATELETS 287 367   NEUTROS ABS 13.72* 17.97*   IMMATURE GRANS (ABS) 0.12* 0.08*   LYMPHS ABS 1.05 0.90   MONOS ABS 1.20* 0.97*   EOS ABS 0.03 0.01   MCV 94.0 94.5   SED RATE  --  74*   CRP  --  12.67*   PROCALCITONIN  --  0.35*   LACTATE  --  1.9         Lab 23  1755   SODIUM 137 135*   POTASSIUM 3.1* 3.6   CHLORIDE 99 95*   CO2 27.0 27.0   ANION GAP 11.0 13.0   BUN 14 16   CREATININE 0.77 0.94   EGFR 87.9 69.2   GLUCOSE 189* 364*   CALCIUM 9.3 9.7   HEMOGLOBIN A1C 8.20*  --          Lab 23  9665   TOTAL PROTEIN 8.3   ALBUMIN 4.3   GLOBULIN 4.0   ALT (SGPT) 44*   AST (SGOT) 25    BILIRUBIN 1.2   ALK PHOS 113                     Brief Urine Lab Results     None          Microbiology Results Abnormal     Procedure Component Value - Date/Time    Wound Culture - Swab, Foot, Right [[id number]] Collected: 01/05/23 2201    Lab Status: Preliminary result Specimen: Swab from Foot, Right Updated: 01/06/23 0615     Gram Stain No WBCs or organisms seen          XR Foot 3+ View Right    Result Date: 1/5/2023  XR FOOT 3+ VW RIGHT Date of Exam: 1/5/2023 8:06 PM EST Indication: DM with foot ulcer; rule out gas in tissue. Comparison: None available. Findings: Postoperative changes are noted status post prior resection of the great toe at the level of the distal diaphysis of the proximal phalanx. There is soft tissue swelling within the residual soft tissues of the great toe extending into the soft tissues of the second through fifth digits and surrounding the forefoot. There is also evidence for edema extending towards the midfoot. The findings suggest changes of soft tissue cellulitis. There is suggestion of possible gas production in the soft tissues at the interdigital space between the first and second metatarsophalangeal joints and extending into the base of the residual stump of the first digit as well as the proximal aspect of the second digit. No definitive cortical erosion or destruction is seen.  There is no definitive evidence for osteomyelitis. Changes of arthropathy are seen throughout the midfoot suggesting developing neuropathic arthropathy or Charcot foot.     Impression: Impression: 1.Soft tissue swelling throughout the forefoot and midfoot. The findings suggest changes of soft tissue cellulitis. 2.There is suggestion of gas production in the soft tissues of the residual first digit and extending towards the base of the second digit. 3.No definitive evidence for osteomyelitis. Electronically Signed: Liath Morgan  1/5/2023 8:37 PM EST  Workstation ID: [id number]    MRI Foot Right With &  Without Contrast    Result Date: 1/6/2023  MRI OF THE RIGHT FOOT WITH AND WITHOUT INTRAVENOUS CONTRAST HISTORY: Right forefoot pain. TECHNIQUE: Multiplanar and multisequence MR imaging of the right forefoot was performed without the administration of intravenous gadolinium. Imaging sequences include axial and sagittal T1, axial and sagittal proton density, coronal and sagittal fat-suppressed proton density, and axial fat-suppressed T2-weighted images. 20 mL of MultiHance was administered. COMPARISON: None. FINDINGS: There is a large amount of diffuse soft tissue swelling of the forefoot that could be a mixture of cellulitis and dependent edema. There is a wound/ulceration over the plantar aspect of the second metatarsal phalangeal joint with some necrosis and poor enhancement of the plantar soft tissues at this site. There is no abscess there is no soft tissue gas. No evidence of osteomyelitis. Partial amputation of the great toe.     Impression: 1. No abscess. No osteomyelitis. 2. Plantar wound/ulceration over the second metatarsal phalangeal joint with some adjacent soft tissue necrosis an inflammatory phlegmon but no well-defined fluid collection. 3. Diffuse soft tissue swelling that could be a mixture of cellulitis and dependent edema. Electronically signed by:  Burke Evans M.D.  1/6/2023 2:11 AM Mountain Time          I have reviewed the medications:  Scheduled Meds:aspirin, 81 mg, Oral, Daily  atorvastatin, 10 mg, Oral, Daily  betamethasone (augmented), 1 application, Topical, BID  Calcium Carb-Cholecalciferol, 1 tablet, Oral, Daily  cetirizine, 5 mg, Oral, Daily  folic acid, 0.5 mg, Oral, Daily  hydroCHLOROthiazide, 50 mg, Oral, Daily  insulin lispro, 0-7 Units, Subcutaneous, 4x Daily With Meals & Nightly  meropenem, 1 g, Intravenous, Q8H  nystatin, , Topical, Q8H  pantoprazole, 40 mg, Oral, Daily  sodium chloride, 10 mL, Intravenous, Q12H  vancomycin, 750 mg, Intravenous, Q12H  vitamin D3, 5,000  Units, Oral, Daily      Continuous Infusions:Pharmacy to dose vancomycin,       PRN Meds:.•  acetaminophen **OR** acetaminophen **OR** acetaminophen  •  dextrose  •  dextrose  •  glucagon (human recombinant)  •  Pharmacy to dose vancomycin  •  potassium chloride **OR** potassium chloride **OR** potassium chloride  •  sodium chloride  •  sodium chloride  •  sodium chloride    Assessment & Plan   Assessment & Plan     Active Hospital Problems    Diagnosis  POA   • **Sepsis ([facility]) [A41.9]  Yes   • Ulcer of right foot ([facility]) [L97.519]  Yes   • Type 2 diabetes mellitus (HCC) [E11.9]  Yes   • Psoriatic arthritis (HCC) [L40.50]  Yes   • Immunocompromised (HCC) [D84.9]  Yes   • GERD (gastroesophageal reflux disease) [K21.9]  Yes      Resolved Hospital Problems   No resolved problems to display.        Brief Hospital Course to date:  62 year old female with history of type 2 diabetes, psoriatic arthritis on immunosuppressive medications who presents with a right foot ulcer.  Patient was noted to have a leukocytosis of 20,000, and elevated procalcitonin and elevated CRP.  X-ray of right foot showed soft tissue swelling throughout the forefoot and midfoot jesting cellulitis, suggestion of gas production the soft tissues of the first digit and extending towards the second digit. MRI of the right foot with and without contrast showed no abscess or osteomyelitis.  She does have wound ulceration of the second metatarsal phalangeal joint with some soft tissue necrosis and inflammatory phlegmon but no fluid collection.     Sepsis secondary to right foot ulcer with cellulitis in the setting of type 2 diabetes, Immunocompromised state  -White blood cell count trending down with antibiotics.  Low-grade fevers overnight  -X-ray of right foot showed soft tissue swelling throughout the forefoot and midfoot jesting cellulitis, suggestion of gas production the soft tissues of the first digit and extending towards the second digit.  -MRI of  the right foot with and without contrast showed no abscess or osteomyelitis.  She does have wound ulceration of the second metatarsal phalangeal joint with some soft tissue necrosis and inflammatory phlegmon but no fluid collection.  -Started on IV vancomycin and meropenem.  Patient has multiple antibiotic allergies including an allergy to clindamycin.  -Cultures x2 with no growth to date  -Wound culture with no growth  -Dr. Raygoza consult pending  -Hold methotrexate  -ID consult  -Check CBC in a.m.  -Counseled patient that she may end up with an amputation to help control the spread of infection.  Waiting may resulted in having to take more of her limb.    Diabetes mellitus type 2  -Hemoglobin A1c noted to be 8.2  -Continue sliding scale insulin    Acute anemia  -Hemoglobin was noted to be 13.1 on presentation, trended down to 11.5.  May be secondary to dilution.  -Patient has acute bleeding will consider further work-up  -CBC in a.m.     Psoriatic arthritis   -on methotrexate: hold for now      GERD  -PPI      Hyperlipidemia  -continue statin      DVT prophylaxis:  scds to LLE      Expected Discharge Location and Transportation: home vs rehab  Expected Discharge   Expected Discharge Date and Time     Expected Discharge Date Expected Discharge Time    Jan 12, 2023            DVT prophylaxis:  Mechanical DVT prophylaxis orders are present.          CODE STATUS:   Code Status and Medical Interventions:   Ordered at: 01/05/23 5828     Level Of Support Discussed With:    Patient     Code Status (Patient has no pulse and is not breathing):    CPR (Attempt to Resuscitate)     Medical Interventions (Patient has pulse or is breathing):    Full Support     This patient's problems and plans were partially entered by my partner and updated as appropriate by me 01/06/23. Today is my first day evaluating this patient's active medical problems. I Personally reviewed chart and adjusted note to reflect daily changes in  management/clinical condition. Copied text in this note has been reviewed and is accurate as of 1/6.      Jazmin Cotton MD  01/06/23

## 2023-01-06 NOTE — PLAN OF CARE
Problem: Adult Inpatient Plan of Care  Goal: Plan of Care Review  Outcome: Ongoing, Progressing  Flowsheets (Taken 1/6/2023 1832)  Plan of Care Reviewed With: patient  Goal: Patient-Specific Goal (Individualized)  Outcome: Ongoing, Progressing  Goal: Absence of Hospital-Acquired Illness or Injury  Outcome: Ongoing, Progressing  Intervention: Prevent Skin Injury  Description: Perform a screening for skin injury risk, such as pressure or moisture associated skin damage on admission and at regular intervals throughout hospital stay.  Keep all areas of skin (especially folds) clean and dry.  Maintain adequate skin hydration.  Relieve and redistribute pressure and protect bony prominences; implement measures based on patient-specific risk factors.  Match turning and repositioning schedule to clinical condition.  Encourage weight shift frequently; assist with reposition if unable to complete independently.  Float heels off bed; avoid pressure on the Achilles tendon.  Keep skin free from extended contact with medical devices.  Encourage functional activity and mobility, as early as tolerated.  Use aids (e.g., slide boards, mechanical lift) during transfer.  Recent Flowsheet Documentation  Taken 1/6/2023 0800 by Wang Howard LPN  Body Position: position changed independently  Intervention: Prevent and Manage VTE (Venous Thromboembolism) Risk  Description: Assess for VTE (venous thromboembolism) risk.  Encourage and assist with early ambulation.  Initiate and maintain compression or other therapy, as indicated, based on identified risk in accordance with organizational protocol and provider order.  Encourage both active and passive leg exercises while in bed, if unable to ambulate.  Recent Flowsheet Documentation  Taken 1/6/2023 0800 by Wang Howard LPN  Activity Management: bedrest  Goal: Optimal Comfort and Wellbeing  Outcome: Ongoing, Progressing  Goal: Readiness for Transition of Care  Outcome: Ongoing,  Progressing   Goal Outcome Evaluation:  Plan of Care Reviewed With: patient

## 2023-01-07 LAB
ANION GAP SERPL CALCULATED.3IONS-SCNC: 14 MMOL/L (ref 5–15)
BASOPHILS # BLD AUTO: 0.06 10*3/MM3 (ref 0–0.2)
BASOPHILS NFR BLD AUTO: 0.5 % (ref 0–1.5)
BUN SERPL-MCNC: 16 MG/DL (ref 8–23)
BUN/CREAT SERPL: 16.7 (ref 7–25)
CALCIUM SPEC-SCNC: 8.8 MG/DL (ref 8.6–10.5)
CHLORIDE SERPL-SCNC: 102 MMOL/L (ref 98–107)
CO2 SERPL-SCNC: 19 MMOL/L (ref 22–29)
CREAT SERPL-MCNC: 0.96 MG/DL (ref 0.57–1)
DEPRECATED RDW RBC AUTO: 47.7 FL (ref 37–54)
EGFRCR SERPLBLD CKD-EPI 2021: 67.5 ML/MIN/1.73
EOSINOPHIL # BLD AUTO: 0.12 10*3/MM3 (ref 0–0.4)
EOSINOPHIL NFR BLD AUTO: 0.9 % (ref 0.3–6.2)
ERYTHROCYTE [DISTWIDTH] IN BLOOD BY AUTOMATED COUNT: 13.4 % (ref 12.3–15.4)
GLUCOSE BLDC GLUCOMTR-MCNC: 216 MG/DL (ref 70–130)
GLUCOSE BLDC GLUCOMTR-MCNC: 270 MG/DL (ref 70–130)
GLUCOSE BLDC GLUCOMTR-MCNC: 272 MG/DL (ref 70–130)
GLUCOSE BLDC GLUCOMTR-MCNC: 315 MG/DL (ref 70–130)
GLUCOSE SERPL-MCNC: 211 MG/DL (ref 65–99)
HCT VFR BLD AUTO: 36 % (ref 34–46.6)
HGB BLD-MCNC: 11.7 G/DL (ref 12–15.9)
IMM GRANULOCYTES # BLD AUTO: 0.08 10*3/MM3 (ref 0–0.05)
IMM GRANULOCYTES NFR BLD AUTO: 0.6 % (ref 0–0.5)
LYMPHOCYTES # BLD AUTO: 0.84 10*3/MM3 (ref 0.7–3.1)
LYMPHOCYTES NFR BLD AUTO: 6.3 % (ref 19.6–45.3)
MCH RBC QN AUTO: 31.5 PG (ref 26.6–33)
MCHC RBC AUTO-ENTMCNC: 32.5 G/DL (ref 31.5–35.7)
MCV RBC AUTO: 97 FL (ref 79–97)
MONOCYTES # BLD AUTO: 0.96 10*3/MM3 (ref 0.1–0.9)
MONOCYTES NFR BLD AUTO: 7.3 % (ref 5–12)
NEUTROPHILS NFR BLD AUTO: 11.18 10*3/MM3 (ref 1.7–7)
NEUTROPHILS NFR BLD AUTO: 84.4 % (ref 42.7–76)
NRBC BLD AUTO-RTO: 0 /100 WBC (ref 0–0.2)
PLATELET # BLD AUTO: 275 10*3/MM3 (ref 140–450)
PMV BLD AUTO: 9.2 FL (ref 6–12)
POTASSIUM SERPL-SCNC: 3.9 MMOL/L (ref 3.5–5.2)
RBC # BLD AUTO: 3.71 10*6/MM3 (ref 3.77–5.28)
SODIUM SERPL-SCNC: 135 MMOL/L (ref 136–145)
WBC NRBC COR # BLD: 13.24 10*3/MM3 (ref 3.4–10.8)

## 2023-01-07 PROCEDURE — 82962 GLUCOSE BLOOD TEST: CPT

## 2023-01-07 PROCEDURE — 97162 PT EVAL MOD COMPLEX 30 MIN: CPT

## 2023-01-07 PROCEDURE — 97164 PT RE-EVAL EST PLAN CARE: CPT

## 2023-01-07 PROCEDURE — 99232 SBSQ HOSP IP/OBS MODERATE 35: CPT | Performed by: PEDIATRICS

## 2023-01-07 PROCEDURE — 25010000002 DAPTOMYCIN PER 1 MG: Performed by: INTERNAL MEDICINE

## 2023-01-07 PROCEDURE — 94799 UNLISTED PULMONARY SVC/PX: CPT

## 2023-01-07 PROCEDURE — 97530 THERAPEUTIC ACTIVITIES: CPT

## 2023-01-07 PROCEDURE — 97110 THERAPEUTIC EXERCISES: CPT

## 2023-01-07 PROCEDURE — 25010000002 PIPERACILLIN SOD-TAZOBACTAM PER 1 G: Performed by: INTERNAL MEDICINE

## 2023-01-07 PROCEDURE — 25010000002 HEPARIN (PORCINE) PER 1000 UNITS: Performed by: THORACIC SURGERY (CARDIOTHORACIC VASCULAR SURGERY)

## 2023-01-07 PROCEDURE — 99024 POSTOP FOLLOW-UP VISIT: CPT | Performed by: THORACIC SURGERY (CARDIOTHORACIC VASCULAR SURGERY)

## 2023-01-07 PROCEDURE — 80048 BASIC METABOLIC PNL TOTAL CA: CPT | Performed by: THORACIC SURGERY (CARDIOTHORACIC VASCULAR SURGERY)

## 2023-01-07 PROCEDURE — 63710000001 INSULIN LISPRO (HUMAN) PER 5 UNITS: Performed by: PEDIATRICS

## 2023-01-07 PROCEDURE — 97605 NEG PRS WND THER DME<=50SQCM: CPT

## 2023-01-07 PROCEDURE — 94660 CPAP INITIATION&MGMT: CPT

## 2023-01-07 PROCEDURE — 63710000001 INSULIN LISPRO (HUMAN) PER 5 UNITS: Performed by: NURSE PRACTITIONER

## 2023-01-07 PROCEDURE — 85025 COMPLETE CBC W/AUTO DIFF WBC: CPT | Performed by: THORACIC SURGERY (CARDIOTHORACIC VASCULAR SURGERY)

## 2023-01-07 RX ORDER — SACCHAROMYCES BOULARDII 250 MG
250 CAPSULE ORAL 2 TIMES DAILY
Status: DISCONTINUED | OUTPATIENT
Start: 2023-01-07 | End: 2023-01-14 | Stop reason: HOSPADM

## 2023-01-07 RX ORDER — INSULIN LISPRO 100 [IU]/ML
0-14 INJECTION, SOLUTION INTRAVENOUS; SUBCUTANEOUS
Status: DISCONTINUED | OUTPATIENT
Start: 2023-01-07 | End: 2023-01-10

## 2023-01-07 RX ADMIN — INSULIN LISPRO 8 UNITS: 100 INJECTION, SOLUTION INTRAVENOUS; SUBCUTANEOUS at 11:18

## 2023-01-07 RX ADMIN — BETAMETHASONE DIPROPIONATE 1 APPLICATION: 0.5 CREAM TOPICAL at 20:01

## 2023-01-07 RX ADMIN — HYDROCHLOROTHIAZIDE 50 MG: 25 TABLET ORAL at 08:53

## 2023-01-07 RX ADMIN — NYSTATIN: 100000 POWDER TOPICAL at 05:14

## 2023-01-07 RX ADMIN — ASPIRIN 81 MG CHEWABLE TABLET 81 MG: 81 TABLET CHEWABLE at 08:57

## 2023-01-07 RX ADMIN — ATORVASTATIN CALCIUM 10 MG: 10 TABLET, FILM COATED ORAL at 08:54

## 2023-01-07 RX ADMIN — PANTOPRAZOLE SODIUM 40 MG: 40 TABLET, DELAYED RELEASE ORAL at 05:14

## 2023-01-07 RX ADMIN — Medication 1 TABLET: at 08:56

## 2023-01-07 RX ADMIN — TAZOBACTAM SODIUM AND PIPERACILLIN SODIUM 3.38 G: 375; 3 INJECTION, SOLUTION INTRAVENOUS at 20:01

## 2023-01-07 RX ADMIN — TAZOBACTAM SODIUM AND PIPERACILLIN SODIUM 3.38 G: 375; 3 INJECTION, SOLUTION INTRAVENOUS at 05:14

## 2023-01-07 RX ADMIN — FOLIC ACID 0.5 MG: 1 TABLET ORAL at 08:54

## 2023-01-07 RX ADMIN — DAPTOMYCIN 500 MG: 500 INJECTION, POWDER, LYOPHILIZED, FOR SOLUTION INTRAVENOUS at 11:34

## 2023-01-07 RX ADMIN — TAZOBACTAM SODIUM AND PIPERACILLIN SODIUM 3.38 G: 375; 3 INJECTION, SOLUTION INTRAVENOUS at 12:52

## 2023-01-07 RX ADMIN — INSULIN LISPRO 3 UNITS: 100 INJECTION, SOLUTION INTRAVENOUS; SUBCUTANEOUS at 08:52

## 2023-01-07 RX ADMIN — Medication 10 ML: at 20:01

## 2023-01-07 RX ADMIN — BETAMETHASONE DIPROPIONATE 1 APPLICATION: 0.5 CREAM TOPICAL at 09:43

## 2023-01-07 RX ADMIN — Medication 250 MG: at 20:01

## 2023-01-07 RX ADMIN — CETIRIZINE HYDROCHLORIDE 5 MG: 10 TABLET, FILM COATED ORAL at 08:54

## 2023-01-07 RX ADMIN — NYSTATIN: 100000 POWDER TOPICAL at 20:02

## 2023-01-07 RX ADMIN — Medication 250 MG: at 11:39

## 2023-01-07 RX ADMIN — SODIUM CHLORIDE 50 ML/HR: 4.5 INJECTION, SOLUTION INTRAVENOUS at 11:21

## 2023-01-07 RX ADMIN — HEPARIN SODIUM 5000 UNITS: 5000 INJECTION INTRAVENOUS; SUBCUTANEOUS at 20:00

## 2023-01-07 RX ADMIN — POTASSIUM CHLORIDE 40 MEQ: 750 CAPSULE, EXTENDED RELEASE ORAL at 03:50

## 2023-01-07 RX ADMIN — HEPARIN SODIUM 5000 UNITS: 5000 INJECTION INTRAVENOUS; SUBCUTANEOUS at 08:54

## 2023-01-07 RX ADMIN — INSULIN LISPRO 10 UNITS: 100 INJECTION, SOLUTION INTRAVENOUS; SUBCUTANEOUS at 18:08

## 2023-01-07 NOTE — PLAN OF CARE
Goal Outcome Evaluation:  Plan of Care Reviewed With: patient        Progress: no change  Outcome Evaluation: PT wound care initial evaluation complete. Pt presents s/p R 2nd transmetatarsal amputation. PT observed wound with Dr. Raygoza present. Pt's wound with good hemostasis and a majority clean, viable tissue at wound base. PT covered exposed structures with Giuliana Ag collagen and white foam. PT applied NPWT to help increase granulation formation, decrease bioburden, and promote optimal wound healing environment. PT anticipating next wound vac dressing change in 2-3 days.

## 2023-01-07 NOTE — THERAPY RE-EVALUATION
Patient Name: Maura Leon  : 1960    MRN: [id number]                              Today's Date: 2023       Admit Date: 2023    Visit Dx:     ICD-10-CM ICD-9-CM   1. Cellulitis of right foot  L03.115 682.7   2. History of diabetes mellitus, type II  Z86.39 V12.29     Patient Active Problem List   Diagnosis   • Sepsis (HCC)   • Ulcer of right foot (HCC)   • Type 2 diabetes mellitus (HCC)   • Psoriatic arthritis (HCC)   • Immunocompromised (HCC)   • GERD (gastroesophageal reflux disease)   • Cellulitis of right foot     Past Medical History:   Diagnosis Date   • Arthritis    • Diabetes mellitus (HCC)    • GERD (gastroesophageal reflux disease)    • Irritable bowel    • Sleep apnea      Past Surgical History:   Procedure Laterality Date   • TOE SURGERY Right 2022      General Information     Row Name 23 1515          Physical Therapy Time and Intention    Document Type re-evaluation  -KG     Mode of Treatment physical therapy  -KG     Row Name 23 1515          General Information    Patient Profile Reviewed yes  -KG     Prior Level of Function independent:;all household mobility;ADL's  pt indep, no AD, caretaker for   -KG     Existing Precautions/Restrictions other (see comments);non-weight bearing  amputation 2nd L transmetatarsal, NWB, wound vac  -KG     Barriers to Rehab medically complex  -KG     Row Name 23 1515          Living Environment    People in Home spouse  -KG     Row Name 23 1515          Home Main Entrance    Number of Stairs, Main Entrance three  -KG     Stair Railings, Main Entrance railings on both sides of stairs  -KG     Row Name 23 1515          Stairs Within Home, Primary    Number of Stairs, Within Home, Primary none  -KG     Row Name 23 1515          Cognition    Orientation Status (Cognition) oriented x 4  -KG     Row Name 23 1515          Safety Issues, Functional Mobility    Safety Issues Affecting Function (Mobility)  insight into deficits/self-awareness;safety precautions follow-through/compliance;positioning of assistive device;impulsivity  -KG     Impairments Affecting Function (Mobility) balance;endurance/activity tolerance;strength;sensation/sensory awareness  -KG           User Key  (r) = Recorded By, (t) = Taken By, (c) = Cosigned By    Initials Name Provider Type    KG Kaity Narvaez Physical Therapist               Mobility     Row Name 01/07/23 1519          Bed Mobility    Bed Mobility supine-sit  -KG     Supine-Sit Adams (Bed Mobility) supervision  -KG     Assistive Device (Bed Mobility) head of bed elevated  -KG     Row Name 01/07/23 1519          Transfers    Comment, (Transfers) CGA, FWW, good adherence to NWB LLE, min-A x2 to SPT to chair, needed assist for AD management  -KG     Row Name 01/07/23 1519          Bed-Chair Transfer    Bed-Chair Adams (Transfers) minimum assist (75% patient effort);2 person assist  -KG     Assistive Device (Bed-Chair Transfers) walker, front-wheeled  -KG     Row Name 01/07/23 1519          Sit-Stand Transfer    Sit-Stand Adams (Transfers) contact guard  -KG     Assistive Device (Sit-Stand Transfers) walker, front-wheeled  -KG     Row Name 01/07/23 1519          Mobility    Extremity Weight-bearing Status left lower extremity  -KG     Left Lower Extremity (Weight-bearing Status) non weight-bearing (NWB)  -KG           User Key  (r) = Recorded By, (t) = Taken By, (c) = Cosigned By    Initials Name Provider Type    TIANA Kaity Narvaez Physical Therapist               Obj/Interventions     Row Name 01/07/23 1520          Strength Comprehensive (MMT)    General Manual Muscle Testing (MMT) Assessment lower extremity strength deficits identified  -KG     Comment, General Manual Muscle Testing (MMT) Assessment 4/5 BLE  -KG     Row Name 01/07/23 1520          Motor Skills    Therapeutic Exercise other (see comments)  SLR, quad sets, ankle pumps x15  -KG     Row Name  01/07/23 1520          Balance    Balance Assessment standing dynamic balance  -KG     Dynamic Standing Balance minimal assist;contact guard  -KG     Position/Device Used, Standing Balance supported;walker, rolling  -KG     Balance Interventions standing;sit to stand  -KG           User Key  (r) = Recorded By, (t) = Taken By, (c) = Cosigned By    Initials Name Provider Type    TIANA Kaity Narvaez Physical Therapist               Goals/Plan     Row Name 01/07/23 1526          Bed Mobility Goal 1 (PT)    Activity/Assistive Device (Bed Mobility Goal 1, PT) sit to supine/supine to sit  -KG     [place] Level/Cues Needed (Bed Mobility Goal 1, PT) independent  -KG     Time Frame (Bed Mobility Goal 1, PT) short term goal (STG);3 days  -KG     Progress/Outcomes (Bed Mobility Goal 1, PT) continuing progress toward goal  -KG     Row Name 01/07/23 1526          Transfer Goal 1 (PT)    Activity/Assistive Device (Transfer Goal 1, PT) sit-to-stand/stand-to-sit;bed-to-chair/chair-to-bed  -KG     [place] Level/Cues Needed (Transfer Goal 1, PT) standby assist  -KG     Time Frame (Transfer Goal 1, PT) short term goal (STG);4 days  -KG     Progress/Outcome (Transfer Goal 1, PT) continuing progress toward goal  -KG     Row Name 01/07/23 1526          Gait Training Goal 1 (PT)    Activity/Assistive Device (Gait Training Goal 1, PT) gait (walking locomotion);walker, rolling  -KG     [place] Level (Gait Training Goal 1, PT) supervision required  -KG     Distance (Gait Training Goal 1, PT) 20  -KG     Time Frame (Gait Training Goal 1, PT) long term goal (LTG);10 days  -KG     Progress/Outcome (Gait Training Goal 1, PT) continuing progress toward goal  -KG           User Key  (r) = Recorded By, (t) = Taken By, (c) = Cosigned By    Initials Name Provider Type    TIANA Kaity Narvaez Physical Therapist               Clinical Impression     Row Name 01/07/23 1521          Pain    Pretreatment Pain Rating 0/10 - no pain  -KG      Posttreatment Pain Rating 0/10 - no pain  -KG     Pain Intervention(s) Ambulation/increased activity;Repositioned  -KG     Row Name 01/07/23 1521          Plan of Care Review    Plan of Care Reviewed With patient;daughter  -KG     Progress no change  -KG     Outcome Evaluation PT eval performed: Pt presenting s/p 2nd transmetatarsal amputation.  STS performed with CGA, FWW, good adherence to NWB LLE, min-A x2 to SPT to chair with FWW, needed assist for AD management.  if patient's stability improves, potential trial of knee scooter.  Pt is caretaker for , Recommend IPR at d/c.  -KG     Row Name 01/07/23 1521          Therapy Assessment/Plan (PT)    Rehab Potential (PT) good, to achieve stated therapy goals  -KG     Criteria for Skilled Interventions Met (PT) yes;meets criteria;skilled treatment is necessary  -KG     Therapy Frequency (PT) daily  -KG     Row Name 01/07/23 1521          Positioning and Restraints    Pre-Treatment Position in bed  -KG     Post Treatment Position chair  -KG     In Chair notified nsg;call light within reach;encouraged to call for assist;exit alarm on;legs elevated;waffle cushion;with family/caregiver  -KG           User Key  (r) = Recorded By, (t) = Taken By, (c) = Cosigned By    Initials Name Provider Type    Kaity Disla Physical Therapist               Outcome Measures     Row Name 01/07/23 1527 01/07/23 0800       How much help from another person do you currently need...    Turning from your back to your side while in flat bed without using bedrails? 4  -KG 4  -BROOKE    Moving from lying on back to sitting on the side of a flat bed without bedrails? 3  -KG 4  -BROOKE    Moving to and from a bed to a chair (including a wheelchair)? 3  -KG 4  -BROOKE    Standing up from a chair using your arms (e.g., wheelchair, bedside chair)? 3  -KG 3  -BROOKE    Climbing 3-5 steps with a railing? 1  -KG 3  -BROOKE    To walk in hospital room? 2  -KG 2  -BROOKE    AM-PAC 6 Clicks Score (PT) 16  -KG 20  -BROOKE     Highest level of mobility 5 --> Static standing  -KG 6 --> Walked 10 steps or more  -BROOKE    Row Name 01/07/23 1527          Functional Assessment    Outcome Measure Options AM-PAC 6 Clicks Basic Mobility (PT)  -KG           User Key  (r) = Recorded By, (t) = Taken By, (c) = Cosigned By    Initials Name Provider Type    Wang Pal, LPN Licensed Nurse    Kaity Disla Physical Therapist                             Physical Therapy Education     Title: PT OT SLP Therapies (Done)     Topic: Physical Therapy (Done)     Point: Mobility training (Done)     Learning Progress Summary           Patient Acceptance, E, VU,NR by KG at 1/7/2023 1529                   Point: Home exercise program (Done)     Learning Progress Summary           Patient Acceptance, E, VU,NR by KG at 1/7/2023 1529                   Point: Body mechanics (Done)     Learning Progress Summary           Patient Acceptance, E, VU,NR by KG at 1/7/2023 1529                   Point: Precautions (Done)     Learning Progress Summary           Patient Acceptance, E, VU,NR by KG at 1/7/2023 1529                               User Key     Initials Effective Dates Name Provider Type Discipline    KG 01/04/23 -  Kaity Narvaez Physical Therapist PT              PT Recommendation and Plan     Plan of Care Reviewed With: patient, daughter  Progress: no change  Outcome Evaluation: PT eval performed: Pt presenting s/p 2nd transmetatarsal amputation.  STS performed with CGA, FWW, good adherence to NWB LLE, min-A x2 to SPT to chair with FWW, needed assist for AD management.  if patient's stability improves, potential trial of knee scooter.  Pt is caretaker for , Recommend IPR at d/c.     Time Calculation:    PT Charges     Row Name 01/07/23 1530 01/07/23 0958          Time Calculation    Start Time 1355  -KG 0846  -LH     PT Received On 01/07/23  -KG --     PT Goal Re-Cert Due Date 01/17/23  -KG 01/17/23  -        Time Calculation- PT     Total Timed Code Minutes- PT 40 minute(s)  -KG --        Timed Charges    44327 - PT Therapeutic Exercise Minutes 10  -KG --     94205 - PT Therapeutic Activity Minutes 30  -KG --        Untimed Charges    PT Eval/Re-eval Minutes -- 35  -LH     Wound Care -- 21847 Neg Press (DME) wound TO 50 sqcm  -     [id number] Pressure wound to 50 sqcm -- 30  -LH        Total Minutes    Timed Charges Total Minutes 40  -KG --     Untimed Charges Total Minutes -- 65  -LH      Total Minutes 40  -KG 65  -LH           User Key  (r) = Recorded By, (t) = Taken By, (c) = Cosigned By    Initials Name Provider Type     Kaity Narvaez Physical Therapist     Rafy Chauhan, PT Physical Therapist              Therapy Charges for Today     Code Description Service Date Service Provider Modifiers Qty    [id number]  PT THERAPEUTIC ACT EA 15 MIN 1/7/2023 Kaity Narvaez GP 2    [id number]  PT RE-EVAL ESTABLISHED PLAN 2 1/7/2023 Kaity Narvaez GP 1          PT G-Codes  Outcome Measure Options: AM-PAC 6 Clicks Basic Mobility (PT)  AM-PAC 6 Clicks Score (PT): 16  PT Discharge Summary  Anticipated Discharge Disposition (PT): inpatient rehabilitation facility    Kaity Narvaez  1/7/2023

## 2023-01-07 NOTE — PROGRESS NOTES
"Maura Leon  1960  [id number]     Chief Complaint:  Right foot infection    Reason for Consultation: right foot infection    History of present illness:     Patient is a 62 y.o.  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  Follows with podiatry in [place]; she has diabetes with chronic sensory neuropathy and has not noticed any recent exposures.  No specific blunt force or penetrating trauma.  She has not stepped on anything she knows of.  No animal insect or arthropod bite.  No fresh/brackish/salt water exposure.  No prior history MRSA VRE C. difficile or ESBL/KP C/CRE organisms.    She is admitted on January 5, 2023 with acute deterioration at the right foot.  She had just noticed a wound on the plantar side of her foot when she had spontaneous drainage and does not know how long it had been there.  She has no pain because of her sensory neuropathy.  She developed acute redness/swelling and was told to come to the emergency room by her podiatrist.  She was noted to have fever/leukocytosis with elevated procalcitonin, sepsis per admission H&P and abnormal MRI with plantar foot wound/ulceration over the second MTP joint with adjacent soft tissue and inflammatory phlegmon but no focal fluid collection per radiology and no osteomyelitis per radiology.  Initial x-ray did raise concern for gas in the soft tissue per radiology.  Dr. Raygoza has seen the patient and he is planning for surgical debridement on January 6 1/6/23 surgery by Dr Raygoza:  \"Pre-op Diagnosis: Gas gangrene of the right foot involving the plantar surface of the right second toe metatarsal head area  Post-op Diagnosis:   Same with extensive necrotizing fasciitis of the right second toe involving the flexor and extensor tendons.\"    \"Procedure(s): Right second " "toe transmetatarsal amputation through the distal third of the second metatarsal bone with extensive sharp soft tissue debridement.  Wound left open to heal by secondary intent/wound VAC therapy\"    1/6 MRSA surveillance culture positive    1/7/23 operative cultures pending.nursing notes fever down and wbc trending down;  postop no pain at the right foot with her sensory neuropathy.  She has redness/swelling unclear duration exactly but evolving over days.  She denies any other specific focal complaints    No headache photophobia or neck stiffness.  No shortness of breath cough or hemoptysis.  No nausea vomiting diarrhea or abdominal pain.  No dysuria hematuria or pyuria.  No skin rash    Review of Systems    1/7/23    Constitutional-- No  chills or sweats.  Appetite diminished with fatigue.  Heent-- No new vision, hearing or throat complaints.  No epistaxis or oral sores.  Denies odynophagia or dysphagia.  No flashers, floaters or eye pain. No odynophagia or dysphagia. No headache, photophobia or neck stiffness.  CV-- No chest pain, palpitation or syncope  Resp-- No SOB/cough/Hemoptysis  GI- No nausea, vomiting, or diarrhea.  No hematochezia, melena, or hematemesis. Denies jaundice or chronic liver disease.  -- No dysuria, hematuria, or flank pain.  Denies hesitancy, urgency or flank pain.  Lymph- no swollen lymph nodes in neck/axilla or groin.   Heme- No active bruising or bleeding; no Hx of DVT or PE.  MS-- no acute swelling or pain in the bones or joints of arms/legs aside from above.  No new back pain.  Neuro-- No acute focal weakness or numbness in the arms or legs.  No seizures.    Full 12 point review of systems reviewed and negative otherwise for acute complaints, except for above    Physical Exam: Nursing/chaperone present  Vital Signs   /72 (BP Location: Right arm, Patient Position: Lying)   Pulse 85   Temp 98.3 °F (36.8 °C) (Oral)   Resp 16   Ht 167.6 cm (66\")   Wt 116 kg (255 lb)   SpO2 " 99%   BMI 41.16 kg/m²     GENERAL: Awake and alert, in no acute distress.   HEENT: Normocephalic, atraumatic.  PERRL. EOMI. No conjunctival injection. No icterus. Oropharynx clear without evidence of thrush or exudate. No evidence of peridontal disease.    NECK: Supple without nuchal rigidity. No mass.  LYMPH: No cervical, axillary or inguinal lymphadenopathy.  HEART: RRR; No murmur, rubs, gallops.   LUNGS: Clear to auscultation bilaterally without wheezing, rales, rhonchi. Normal respiratory effort. Nonlabored. No dullness.  ABDOMEN: Soft, nontender, nondistended. Positive bowel sounds. No rebound or guarding. NO mass or HSM.  EXT:  No cyanosis, clubbing . No cord.  See below   MSK: FROM without joint effusions noted arms/legs.    SKIN: Warm and dry without cutaneous eruptions on Inspection/palpation.  See below  NEURO: Oriented to PPT. No focal deficits on motor/sensory exam at arms/legs.  PSYCHIATRIC: Normal insight and judgement. Cooperative with PE    Right foot surgical site noted;  Redness at foot no progression.  No discrete mass bulge or fluctuance.  No crepitus or bulla.  Partial right hallux amputation noted with healed surgical site.  Onychomycosis noted with thickened nails; no new purulence    No peripheral stigmata/phenomena of endocarditis    IV without obvious redness or drainage    Laboratory Data    Results from last 7 days   Lab Units 01/07/23  0353 01/06/23  0411 01/05/23  1755   WBC 10*3/mm3 13.24* 16.19* 20.01*   HEMOGLOBIN g/dL 11.7* 11.5* 13.1   HEMATOCRIT % 36.0 34.2 39.5   PLATELETS 10*3/mm3 275 287 367     Results from last 7 days   Lab Units 01/07/23  0353   SODIUM mmol/L 135*   POTASSIUM mmol/L 3.9   CHLORIDE mmol/L 102   CO2 mmol/L 19.0*   BUN mg/dL 16   CREATININE mg/dL 0.96   GLUCOSE mg/dL 211*   CALCIUM mg/dL 8.8     Results from last 7 days   Lab Units 01/05/23  1755   ALK PHOS U/L 113   BILIRUBIN mg/dL 1.2   ALT (SGPT) U/L 44*   AST (SGOT) U/L 25     Results from last 7 days    Lab Units 01/05/23  1755   SED RATE mm/hr 74*     Results from last 7 days   Lab Units 01/05/23  1755   CRP mg/dL 12.67*       Estimated Creatinine Clearance: 79.7 mL/min (by C-G formula based on SCr of 0.96 mg/dL).      Microbiology:      Radiology:  Imaging Results (Last 72 Hours)     Procedure Component Value Units Date/Time    MRI Foot Right With & Without Contrast [[id number]] Collected: 01/06/23 0209     Updated: 01/06/23 0413    Narrative:      MRI OF THE RIGHT FOOT WITH AND WITHOUT INTRAVENOUS CONTRAST    HISTORY: Right forefoot pain.    TECHNIQUE: Multiplanar and multisequence MR imaging of the right forefoot was performed without the administration of intravenous gadolinium. Imaging sequences include axial and sagittal T1, axial and sagittal proton density, coronal and sagittal   fat-suppressed proton density, and axial fat-suppressed T2-weighted images. 20 mL of MultiHance was administered.    COMPARISON: None.    FINDINGS:    There is a large amount of diffuse soft tissue swelling of the forefoot that could be a mixture of cellulitis and dependent edema. There is a wound/ulceration over the plantar aspect of the second metatarsal phalangeal joint with some necrosis and poor   enhancement of the plantar soft tissues at this site. There is no abscess there is no soft tissue gas. No evidence of osteomyelitis. Partial amputation of the great toe.          Impression:      1. No abscess. No osteomyelitis.  2. Plantar wound/ulceration over the second metatarsal phalangeal joint with some adjacent soft tissue necrosis an inflammatory phlegmon but no well-defined fluid collection.  3. Diffuse soft tissue swelling that could be a mixture of cellulitis and dependent edema.    Electronically signed by:  uBrke Evans M.D.    1/6/2023 2:11 AM Mountain Time    XR Foot 3+ View Right [[id number]] Collected: 01/05/23 2033     Updated: 01/05/23 2039    Narrative:      XR FOOT 3+ VW RIGHT    Date of Exam: 1/5/2023  8:06 PM EST    Indication: DM with foot ulcer; rule out gas in tissue.    Comparison: None available.    Findings:  Postoperative changes are noted status post prior resection of the great toe at the level of the distal diaphysis of the proximal phalanx. There is soft tissue swelling within the residual soft tissues of the great toe extending into the soft tissues of   the second through fifth digits and surrounding the forefoot. There is also evidence for edema extending towards the midfoot. The findings suggest changes of soft tissue cellulitis. There is suggestion of possible gas production in the soft tissues at   the interdigital space between the first and second metatarsophalangeal joints and extending into the base of the residual stump of the first digit as well as the proximal aspect of the second digit. No definitive cortical erosion or destruction is seen.   There is no definitive evidence for osteomyelitis. Changes of arthropathy are seen throughout the midfoot suggesting developing neuropathic arthropathy or Charcot foot.      Impression:      Impression:  1.Soft tissue swelling throughout the forefoot and midfoot. The findings suggest changes of soft tissue cellulitis.  2.There is suggestion of gas production in the soft tissues of the residual first digit and extending towards the base of the second digit.  3.No definitive evidence for osteomyelitis.    Electronically Signed: Laith Morgan    1/5/2023 8:37 PM EST    Workstation ID: [id number]            Impression:     --Acute sepsis present on admission per medicine notes, in the setting of chronic immunosuppression/psoriatic arthritis and acute deterioration in her right foot with right foot infection most likely source.  Significant leukocytosis with abnormal procalcitonin and abnormal imaging.  Dr. Raygoza has seen the patient with plans for surgical debridement and high risk for further serious morbidity and other serious sequela.  She understands  the risk for persistent/recurrent or nonhealing wounds, persistent/progressive or recurrent infection and risk for further functional/limb loss, higher level amputations etc.  She knows antibiotics and surgery not a guarantee for cure.  Timing/option of threshold including extent for any further debridement/amputation at discretion of Dr. Raygoza; surgery 1/6 as above    --acute /severe right foot infection with necrotizing fasciitis at surgery as above    **Cx data pending    --MRSA surveillance culture positivity    -- Psoriatic arthritis with chronic methotrexate, currently on hold by medicine team.    -- Diabetes with chronic sensory neuropathy.  She understands the importance of protecting her feet.  You need to tightly control blood sugar to give best chance for healing    -- Antibiotic allergies as above to clindamycin, doxycycline, sulfa and fluoroquinolones      PLAN:      -- IV daptomycin/Zosyn    -- Check/review labs cultures and scans    -- Partial history per nursing staff    -- Discussed with microbiology    -- Highly complex set of issues with high risk for further serious morbidity and other serious sequela    -- Discussed with Dr. Raygoza.  Severe infection as outlined above.  Operative culture data pending.    Copied text in this note has been reviewed and is accurate as of 01/07/23.        Kevin Abdul MD  1/7/2023

## 2023-01-07 NOTE — PROGRESS NOTES
Cardiothoracic Surgery Progress Note      POD #: 1-transmetatarsal amputation of the right foot wound left open to heal by second intent wound VAC.     LOS: 2 days      Subjective: Awake and alert    Objective:  Vital Signs vital signs below noted T-max past 24 hours 102.2 °F  Temp:  [98.1 °F (36.7 °C)-102.2 °F (39 °C)] 98.3 °F (36.8 °C)  Heart Rate:  [77-93] 85  Resp:  [12-21] 16  BP: (115-136)/(60-90) 121/72    Physical Exam:   General Appearance: Oriented x3   Lungs:   Heart:   Skin:   Incision: Amputation site has surgical dressing applied.     Results:  Results from last 7 days   Lab Units 01/07/23  0353   WBC 10*3/mm3 13.24*   HEMOGLOBIN g/dL 11.7*   HEMATOCRIT % 36.0   PLATELETS 10*3/mm3 275     Results from last 7 days   Lab Units 01/07/23  0353   SODIUM mmol/L 135*   POTASSIUM mmol/L 3.9   CHLORIDE mmol/L 102   CO2 mmol/L 19.0*   BUN mg/dL 16   CREATININE mg/dL 0.96   GLUCOSE mg/dL 211*   CALCIUM mg/dL 8.8         Assessment:  #1.  Postop day 1 right second toe transmetatarsal amputation wound left open to heal by secondary closure wound VAC.  #2.  Diabetic neuropathy with plantar ulceration right second toe metatarsal head area./Gas gangrene present.  Plan: PT wound care for wound VAC placement.  Overall medical manage per hospitalist.  Antibiotics per infectious disease.      Javon Raygoza MD - 01/07/23 - 06:17 EST

## 2023-01-07 NOTE — PROGRESS NOTES
[facility] Medicine Services  PROGRESS NOTE    Patient Name: Maura Leon  : 1960  MRN: [id number]    Date of Admission: 2023  Primary Care Physician: Emanuel Fuentes DPM    Subjective   Subjective     CC:  Diabetic ulcer    HPI:  Patient is overall doing well.  Denies any pain since she cannot feel her feet.  Tolerating her breakfast and feeling very hungry.  Last time she was in the hospital in July she had significant diarrhea due to antibiotics, and is worried about that happening again.  Patient is requesting some probiotics    ROS:  Gen- No fevers, chills  CV- No chest pain, palpitations  Resp- No cough, dyspnea  GI- No N/V/D, abd pain        Objective   Objective     Vital Signs:   Temp:  [98.1 °F (36.7 °C)-102.2 °F (39 °C)] 98.3 °F (36.8 °C)  Heart Rate:  [77-93] 85  Resp:  [12-21] 16  BP: (115-136)/(60-90) 121/72  Flow (L/min):  [2-4] 2     Physical Exam:  Constitutional: No acute distress, awake, alert, sitting in bed eating breakfast  Respiratory: Clear to auscultation bilaterally, respiratory effort normal on room air  Cardiovascular: RRR, no murmurs, rubs, or gallops  Gastrointestinal: Positive bowel sounds, soft, nontender, nondistended  Musculoskeletal: No bilateral ankle edema, wound VAC in place on right foot  Psychiatric: Appropriate affect, cooperative  Neurologic: Oriented x 3, strength symmetric in all extremities, Cranial Nerves grossly intact to confrontation, speech clear        Results Reviewed:  LAB RESULTS:      Lab 23  0353 23  0411 23  1755   WBC 13.24* 16.19* 20.01*   HEMOGLOBIN 11.7* 11.5* 13.1   HEMATOCRIT 36.0 34.2 39.5   PLATELETS 275 287 367   NEUTROS ABS 11.18* 13.72* 17.97*   IMMATURE GRANS (ABS) 0.08* 0.12* 0.08*   LYMPHS ABS 0.84 1.05 0.90   MONOS ABS 0.96* 1.20* 0.97*   EOS ABS 0.12 0.03 0.01   MCV 97.0 94.0 94.5   SED RATE  --   --  74*   CRP  --   --  12.67*   PROCALCITONIN  --   --  0.35*   LACTATE  --   --  1.9          Lab 01/07/23  0353 01/06/23  0411 01/05/23  1755   SODIUM 135* 137 135*   POTASSIUM 3.9 3.1* 3.6   CHLORIDE 102 99 95*   CO2 19.0* 27.0 27.0   ANION GAP 14.0 11.0 13.0   BUN 16 14 16   CREATININE 0.96 0.77 0.94   EGFR 67.5 87.9 69.2   GLUCOSE 211* 189* 364*   CALCIUM 8.8 9.3 9.7   MAGNESIUM  --  1.9  --    HEMOGLOBIN A1C  --  8.20*  --          Lab 01/05/23  1755   TOTAL PROTEIN 8.3   ALBUMIN 4.3   GLOBULIN 4.0   ALT (SGPT) 44*   AST (SGOT) 25   BILIRUBIN 1.2   ALK PHOS 113                 Lab 01/06/23  1155   ABO TYPING O   RH TYPING Positive   ANTIBODY SCREEN Negative         Brief Urine Lab Results     None          Microbiology Results Abnormal     Procedure Component Value - Date/Time    Tissue / Bone Culture - Tissue, Toe, Right [[id number]] Collected: 01/06/23 1832    Lab Status: Preliminary result Specimen: Tissue from Toe, Right Updated: 01/07/23 0908     Tissue Culture No growth     Gram Stain No WBCs or organisms seen    Wound Culture - Wound, Toe, Right [[id number]] Collected: 01/06/23 1733    Lab Status: Preliminary result Specimen: Wound from Toe, Right Updated: 01/07/23 0900     Wound Culture No growth     Gram Stain No WBCs or organisms seen    Blood Culture - Blood, Arm, Left [[id number]]  (Normal) Collected: 01/05/23 2105    Lab Status: Preliminary result Specimen: Blood from Arm, Left Updated: 01/06/23 2131     Blood Culture No growth at 24 hours    Blood Culture - Blood, Arm, Left [[id number]]  (Normal) Collected: 01/05/23 1758    Lab Status: Preliminary result Specimen: Blood from Arm, Left Updated: 01/06/23 1815     Blood Culture No growth at 24 hours    Wound Culture - Swab, Foot, Right [[id number]] Collected: 01/05/23 2201    Lab Status: Preliminary result Specimen: Swab from Foot, Right Updated: 01/06/23 0615     Gram Stain No WBCs or organisms seen          XR Foot 3+ View Right    Result Date: 1/5/2023  XR FOOT 3+ VW RIGHT Date of Exam: 1/5/2023 8:06 PM EST Indication: DM with foot ulcer;  rule out gas in tissue. Comparison: None available. Findings: Postoperative changes are noted status post prior resection of the great toe at the level of the distal diaphysis of the proximal phalanx. There is soft tissue swelling within the residual soft tissues of the great toe extending into the soft tissues of the second through fifth digits and surrounding the forefoot. There is also evidence for edema extending towards the midfoot. The findings suggest changes of soft tissue cellulitis. There is suggestion of possible gas production in the soft tissues at the interdigital space between the first and second metatarsophalangeal joints and extending into the base of the residual stump of the first digit as well as the proximal aspect of the second digit. No definitive cortical erosion or destruction is seen.  There is no definitive evidence for osteomyelitis. Changes of arthropathy are seen throughout the midfoot suggesting developing neuropathic arthropathy or Charcot foot.     Impression: Impression: 1.Soft tissue swelling throughout the forefoot and midfoot. The findings suggest changes of soft tissue cellulitis. 2.There is suggestion of gas production in the soft tissues of the residual first digit and extending towards the base of the second digit. 3.No definitive evidence for osteomyelitis. Electronically Signed: Laith Morgan  1/5/2023 8:37 PM EST  Workstation ID: [id number]    MRI Foot Right With & Without Contrast    Result Date: 1/6/2023  MRI OF THE RIGHT FOOT WITH AND WITHOUT INTRAVENOUS CONTRAST HISTORY: Right forefoot pain. TECHNIQUE: Multiplanar and multisequence MR imaging of the right forefoot was performed without the administration of intravenous gadolinium. Imaging sequences include axial and sagittal T1, axial and sagittal proton density, coronal and sagittal fat-suppressed proton density, and axial fat-suppressed T2-weighted images. 20 mL of MultiHance was administered. COMPARISON: None.  FINDINGS: There is a large amount of diffuse soft tissue swelling of the forefoot that could be a mixture of cellulitis and dependent edema. There is a wound/ulceration over the plantar aspect of the second metatarsal phalangeal joint with some necrosis and poor enhancement of the plantar soft tissues at this site. There is no abscess there is no soft tissue gas. No evidence of osteomyelitis. Partial amputation of the great toe.     Impression: 1. No abscess. No osteomyelitis. 2. Plantar wound/ulceration over the second metatarsal phalangeal joint with some adjacent soft tissue necrosis an inflammatory phlegmon but no well-defined fluid collection. 3. Diffuse soft tissue swelling that could be a mixture of cellulitis and dependent edema. Electronically signed by:  Burke Evans M.D.  1/6/2023 2:11 AM Mountain Time          I have reviewed the medications:  Scheduled Meds:aspirin, 81 mg, Oral, Daily  atorvastatin, 10 mg, Oral, Daily  betamethasone (augmented), 1 application, Topical, BID  Calcium Carb-Cholecalciferol, 1 tablet, Oral, Daily  cetirizine, 5 mg, Oral, Daily  DAPTOmycin, 6 mg/kg (Adjusted), Intravenous, Q24H  folic acid, 0.5 mg, Oral, Daily  heparin (porcine), 5,000 Units, Subcutaneous, Q12H  hydroCHLOROthiazide, 50 mg, Oral, Daily  insulin lispro, 0-7 Units, Subcutaneous, 4x Daily With Meals & Nightly  nystatin, , Topical, Q8H  pantoprazole, 40 mg, Oral, Q AM  piperacillin-tazobactam, 3.375 g, Intravenous, Q8H  sodium chloride, 10 mL, Intravenous, Q12H  vitamin D3, 5,000 Units, Oral, Daily      Continuous Infusions:lactated ringers, 9 mL/hr, Last Rate: 9 mL/hr (01/06/23 1400)  sodium chloride, 50 mL/hr, Last Rate: 50 mL/hr (01/06/23 2234)      PRN Meds:.•  [DISCONTINUED] acetaminophen **OR** acetaminophen **OR** acetaminophen  •  acetaminophen  •  dextrose  •  dextrose  •  glucagon (human recombinant)  •  HYDROcodone-acetaminophen  •  HYDROmorphone **AND** naloxone  •  Morphine **AND** naloxone  •   ondansetron **OR** ondansetron  •  potassium chloride **OR** potassium chloride **OR** potassium chloride  •  sodium chloride  •  sodium chloride    Assessment & Plan   Assessment & Plan     Active Hospital Problems    Diagnosis  POA   • **Sepsis (HCC) [A41.9]  Yes   • Ulcer of right foot ([facility]) [L97.519]  Yes   • Type 2 diabetes mellitus (HCC) [E11.9]  Yes   • Psoriatic arthritis (HCC) [L40.50]  Yes   • Immunocompromised (HCC) [D84.9]  Yes   • GERD (gastroesophageal reflux disease) [K21.9]  Yes   • Cellulitis of right foot [L03.115]  Unknown      Resolved Hospital Problems   No resolved problems to display.        Brief Hospital Course to date:  62 year old female with history of type 2 diabetes, psoriatic arthritis on immunosuppressive medications who presents with a right foot ulcer.  Patient was noted to have a leukocytosis of 20,000, and elevated procalcitonin and elevated CRP.  X-ray of right foot showed soft tissue swelling throughout the forefoot and midfoot jesting cellulitis, suggestion of gas production the soft tissues of the first digit and extending towards the second digit. MRI of the right foot no abscess or osteomyelitis.       Sepsis secondary to right foot ulcer with cellulitis in the setting of type 2 diabetes, Immunocompromised state  -Started on IV vancomycin and meropenem, but changed to dapto and zosyn.  -Cultures x2 with no growth to date, MRSA +  -Wound culture with no growth  -S/p R 2nd toe transmetatarsal amputation with Dr. Raygoza  -Hold methotrexate  -ID recs appreciated      Diabetes mellitus type 2  -Hemoglobin A1c noted to be 8.2  -Continue sliding scale insulin    Acute anemia  -H/H stable, monitor for any bleeding.     Psoriatic arthritis   -on methotrexate: hold for now      GERD  -PPI      Hyperlipidemia  -continue statin      DVT prophylaxis:  scds to LLE      Expected Discharge Location and Transportation: home vs rehab  Expected Discharge   Expected Discharge Date and Time      Expected Discharge Date Expected Discharge Time    Jan 12, 2023            DVT prophylaxis:  Medical and mechanical DVT prophylaxis orders are present.     AM-PAC 6 Clicks Score (PT): 23 (01/06/23 0800)    CODE STATUS:   Code Status and Medical Interventions:   Ordered at: 01/05/23 2143     Level Of Support Discussed With:    Patient     Code Status (Patient has no pulse and is not breathing):    CPR (Attempt to Resuscitate)     Medical Interventions (Patient has pulse or is breathing):    Full Support     This patient's problems and plans were partially entered by my partner and updated as appropriate by me 01/07/23. Today is my first day evaluating this patient's active medical problems. I Personally reviewed chart and adjusted note to reflect daily changes in management/clinical condition. Copied text in this note has been reviewed and is accurate as of 01/07/23.       Isela Jasmine MD  01/07/23

## 2023-01-07 NOTE — PLAN OF CARE
Problem: Adult Inpatient Plan of Care  Goal: Plan of Care Review  Outcome: Ongoing, Progressing  Goal: Patient-Specific Goal (Individualized)  Outcome: Ongoing, Progressing  Goal: Absence of Hospital-Acquired Illness or Injury  Outcome: Ongoing, Progressing  Intervention: Identify and Manage Fall Risk  Description: Perform standard risk assessment on admission using a validated tool or comprehensive approach appropriate to the patient; reassess fall risk frequently, with change in status or transfer to another level of care.  Communicate fall injury risk to interprofessional healthcare team.  Determine need for increased observation, equipment and environmental modification, such as low bed, signage and supportive, nonskid footwear.  Adjust safety measures to individual developmental age, stage and identified risk factors.  Reinforce the importance of safety and physical activity with patient and family.  Perform regular intentional rounding to assess need for position change, pain assessment and personal needs, including assistance with toileting.  Recent Flowsheet Documentation  Taken 1/7/2023 0400 by Jeannette Miles, RN  Safety Promotion/Fall Prevention:   activity supervised   assistive device/personal items within reach   clutter free environment maintained   fall prevention program maintained   lighting adjusted   mobility aid in reach   nonskid shoes/slippers when out of bed   room organization consistent   safety round/check completed  Taken 1/7/2023 0200 by Jeannette Miles, RN  Safety Promotion/Fall Prevention:   activity supervised   assistive device/personal items within reach   clutter free environment maintained   fall prevention program maintained   lighting adjusted   mobility aid in reach   nonskid shoes/slippers when out of bed   room organization consistent   safety round/check completed  Taken 1/7/2023 0000 by Jeannette Miles, RN  Safety Promotion/Fall Prevention:   activity supervised   assistive  device/personal items within reach   clutter free environment maintained   fall prevention program maintained   lighting adjusted   mobility aid in reach   nonskid shoes/slippers when out of bed   room organization consistent   safety round/check completed  Taken 1/6/2023 2200 by Jeannette Miles RN  Safety Promotion/Fall Prevention:   activity supervised   assistive device/personal items within reach   clutter free environment maintained   fall prevention program maintained   lighting adjusted   mobility aid in reach   nonskid shoes/slippers when out of bed   room organization consistent   safety round/check completed  Taken 1/6/2023 2000 by Jeannette Miles RN  Safety Promotion/Fall Prevention:   activity supervised   assistive device/personal items within reach   clutter free environment maintained   fall prevention program maintained   lighting adjusted   mobility aid in reach   nonskid shoes/slippers when out of bed   room organization consistent   safety round/check completed  Intervention: Prevent Skin Injury  Description: Perform a screening for skin injury risk, such as pressure or moisture associated skin damage on admission and at regular intervals throughout hospital stay.  Keep all areas of skin (especially folds) clean and dry.  Maintain adequate skin hydration.  Relieve and redistribute pressure and protect bony prominences; implement measures based on patient-specific risk factors.  Match turning and repositioning schedule to clinical condition.  Encourage weight shift frequently; assist with reposition if unable to complete independently.  Float heels off bed; avoid pressure on the Achilles tendon.  Keep skin free from extended contact with medical devices.  Encourage functional activity and mobility, as early as tolerated.  Use aids (e.g., slide boards, mechanical lift) during transfer.  Recent Flowsheet Documentation  Taken 1/7/2023 0400 by Jeannette Miles RN  Body Position: position changed  independently  Taken 1/7/2023 0200 by Jeannette Miles RN  Body Position: position changed independently  Taken 1/6/2023 2200 by Jeannette Miles RN  Body Position: position changed independently  Taken 1/6/2023 2000 by Jeannette Miles RN  Body Position: position changed independently  Skin Protection:   adhesive use limited   tubing/devices free from skin contact   transparent dressing maintained   skin-to-skin areas padded   skin-to-device areas padded  Intervention: Prevent and Manage VTE (Venous Thromboembolism) Risk  Description: Assess for VTE (venous thromboembolism) risk.  Encourage and assist with early ambulation.  Initiate and maintain compression or other therapy, as indicated, based on identified risk in accordance with organizational protocol and provider order.  Encourage both active and passive leg exercises while in bed, if unable to ambulate.  Recent Flowsheet Documentation  Taken 1/7/2023 0400 by Jeannette Miles RN  Activity Management: activity adjusted per tolerance  Taken 1/7/2023 0200 by Jeannette Miles RN  Activity Management: activity adjusted per tolerance  Taken 1/7/2023 0000 by Jeannette Miles RN  Activity Management: activity adjusted per tolerance  Taken 1/6/2023 2200 by Jeannette Miles RN  Activity Management: activity adjusted per tolerance  Taken 1/6/2023 2000 by Jeannette Miles RN  Activity Management: activity adjusted per tolerance  Intervention: Prevent Infection  Description: Maintain skin and mucous membrane integrity; promote hand, oral and pulmonary hygiene.  Optimize fluid balance, nutrition, sleep and glycemic control to maximize infection resistance.  Identify potential sources of infection early to prevent or mitigate progression of infection (e.g., wound, lines, devices).  Evaluate ongoing need for invasive devices; remove promptly when no longer indicated.  Recent Flowsheet Documentation  Taken 1/6/2023 2000 by Jeannette Miles RN  Infection Prevention:   environmental  surveillance performed   equipment surfaces disinfected   hand hygiene promoted   personal protective equipment utilized   rest/sleep promoted  Goal: Optimal Comfort and Wellbeing  Outcome: Ongoing, Progressing  Goal: Readiness for Transition of Care  Outcome: Ongoing, Progressing   Goal Outcome Evaluation:

## 2023-01-07 NOTE — THERAPY EVALUATION
Acute Care - Wound/Debridement Initial Evaluation  [facility]     Patient Name: Maura Leon  : 1960  MRN: [id number]  Today's Date: 2023                Admit Date: 2023    Visit Dx:    ICD-10-CM ICD-9-CM   1. Cellulitis of right foot  L03.115 682.7   2. History of diabetes mellitus, type II  Z86.39 V12.29     R 2nd toe amputation site          Patient Active Problem List   Diagnosis   • Sepsis (HCC)   • Ulcer of right foot (HCC)   • Type 2 diabetes mellitus (HCC)   • Psoriatic arthritis (HCC)   • Immunocompromised (HCC)   • GERD (gastroesophageal reflux disease)   • Cellulitis of right foot        Past Medical History:   Diagnosis Date   • Arthritis    • Diabetes mellitus (HCC)    • GERD (gastroesophageal reflux disease)    • Irritable bowel    • Sleep apnea         Past Surgical History:   Procedure Laterality Date   • TOE SURGERY Right 2022           Wound 23 2315 Right posterior plantar Diabetic Ulcer (Active)   Dressing Appearance intact;dry 23   Closure None 23   Base dressing in place, unable to visualize 23   Periwound Care dry periwound area maintained 23       Wound 23 1733 Right anterior foot Incision (Active)   Wound Image    23 08   Dressing Appearance intact;moist drainage 23 08   Base moist;pink;red;yellow;subcutaneous;exposed structure;other (see comments) 23 08   Periwound dry;intact;pink 23   Periwound Temperature warm 23   Periwound Skin Turgor soft 23 0846   Edges open 23 0846   Wound Length (cm) 6 cm 23 0846   Wound Width (cm) 3.8 cm 23 0846   Wound Depth (cm) 4.7 cm 23 0846   Wound Surface Area (cm^2) 22.8 cm^2 23 0846   Wound Volume (cm^3) 107.16 cm^3 23 0846   Drainage Characteristics/Odor serosanguineous 23   Drainage Amount scant 23 08   Care, Wound irrigated with;sterile normal saline;negative pressure  wound therapy 01/07/23 0846   Dressing Care dressing applied;silver impregnated;collagen;foam 01/07/23 0846   Periwound Care cleansed with pH balanced cleanser;dry periwound area maintained;barrier film applied;other (see comments) 01/07/23 0846       NPWT (Negative Pressure Wound Therapy) 01/07/23 0900 R 2nd toe amputation site (Active)   Therapy Setting continuous therapy 01/07/23 0846   Dressing foam, white;foam, black 01/07/23 0846   Contact Layer other (see comments) 01/07/23 0846   Pressure Setting 150 mmHg 01/07/23 0846   Sponges Inserted 2 01/07/23 0846   Sponges Removed other (see comments) 01/07/23 0846   Finger sweep complete Yes 01/07/23 0846         WOUND DEBRIDEMENT                     PT Assessment (last 12 hours)     PT Evaluation and Treatment     Row Name 01/07/23 0846          Physical Therapy Time and Intention    Subjective Information no complaints  -     Document Type evaluation;wound care  -     Mode of Treatment physical therapy;individual therapy  -     Row Name 01/07/23 0846          General Information    Patient Profile Reviewed yes  -     Patient Observations alert;cooperative;agree to therapy  -     Risks Reviewed patient:;increased discomfort  -     Benefits Reviewed patient:;increase knowledge;improve skin integrity  -     Barriers to Rehab medically complex  -     Row Name 01/07/23 0846          Pain    Pretreatment Pain Rating 0/10 - no pain  -     Posttreatment Pain Rating 0/10 - no pain  -     Row Name 01/07/23 0846          Cognition    Affect/Mental Status (Cognition) [facility]     Orientation Status (Cognition) oriented x 4  -     Row Name             Wound 01/05/23 2315 Right posterior plantar Diabetic Ulcer    Wound - Properties Group Placement Date: 01/05/23  -DP Placement Time: 2315 -DP Present on Hospital Admission: Y  -DP Side: Right  -DP Orientation: posterior  -DP Location: plantar  -DP Primary Wound Type: Diabetic ulc  -DP    Retired Wound -  Properties Group Placement Date: 01/05/23  -DP Placement Time: 2315  -DP Present on Hospital Admission: Y  -DP Side: Right  -DP Orientation: posterior  -DP Location: plantar  -DP Primary Wound Type: Diabetic ulc  -DP    Retired Wound - Properties Group Date first assessed: 01/05/23  -DP Time first assessed: 2315  -DP Present on Hospital Admission: Y  -DP Side: Right  -DP Location: plantar  -DP Primary Wound Type: Diabetic ulc  -DP    Row Name 01/07/23 0846          Wound 01/06/23 1733 Right anterior foot Incision    Wound - Properties Group Placement Date: 01/06/23  -EQ Placement Time: 1733  -EQ Present on Hospital Admission: N  -EQ Side: Right  -EQ Orientation: anterior  -EQ Location: foot  -EQ Primary Wound Type: Incision  -EQ    Wound Image Images linked: 2  -LH     Dressing Appearance intact;moist drainage  4x4 gauze packing  -     Base moist;pink;red;yellow;subcutaneous;exposed structure;other (see comments)  Exposed bone, muscle, and tendon  -     Periwound dry;intact;pink  -     Periwound Temperature warm  -     Periwound Skin Turgor soft  -     Edges open  -     Wound Length (cm) 6 cm  -     Wound Width (cm) 3.8 cm  -     Wound Depth (cm) 4.7 cm  -     Wound Surface Area (cm^2) 22.8 cm^2  -LH     Wound Volume (cm^3) 107.16 cm^3  -LH     Drainage Characteristics/Odor serosanguineous  -     Drainage Amount scant  -     Care, Wound irrigated with;sterile normal saline;negative pressure wound therapy  -     Dressing Care dressing applied;silver impregnated;collagen;foam  Ptisma Ag, vac foam  -     Periwound Care cleansed with pH balanced cleanser;dry periwound area maintained;barrier film applied;other (see comments)  NoSting, stomapaste, border drape  -LH     Retired Wound - Properties Group Placement Date: 01/06/23  -EQ Placement Time: 1733  -EQ Present on Hospital Admission: N  -EQ Side: Right  -EQ Orientation: anterior  -EQ Location: foot  -EQ Primary Wound Type: Incision  -EQ     Retired Wound - Properties Group Date first assessed: 01/06/23  -EQ Time first assessed: 1733  -EQ Present on Hospital Admission: N  -EQ Side: Right  -EQ Location: foot  -EQ Primary Wound Type: Incision  -EQ    Row Name 01/07/23 0846          NPWT (Negative Pressure Wound Therapy) 01/07/23 0900 R 2nd toe amputation site    NPWT (Negative Pressure Wound Therapy) - Properties Group Placement Date: 01/07/23  - Placement Time: 0900  -LH Location: R 2nd toe amputation site  -    Therapy Setting continuous therapy  -     Dressing foam, white;foam, black  -     Contact Layer other (see comments)  Giuliana Ag  -     Pressure Setting 150 mmHg  -     Sponges Inserted 2  1 white, 1 black  -     Sponges Removed other (see comments)  4x4 gauze packing  -     Finger sweep complete Yes  -     Retired NPWT (Negative Pressure Wound Therapy) - Properties Group Placement Date: 01/07/23  - Placement Time: 0900  -LH Location: R 2nd toe amputation site  -LH    Retired NPWT (Negative Pressure Wound Therapy) - Properties Group Placement Date: 01/07/23  - Placement Time: 0900  -LH Location: R 2nd toe amputation site  -    Row Name 01/07/23 0846          Coping    Observed Emotional State calm;cooperative  -     Verbalized Emotional State acceptance  -     Trust Relationship/Rapport care explained;questions answered  -     Row Name 01/07/23 0846          Plan of Care Review    Plan of Care Reviewed With patient  -     Progress no change  -     Outcome Evaluation PT wound care initial evaluation complete. Pt presents s/p R 2nd transmetatarsal amputation. PT observed wound with Dr. Raygoza present. Pt's wound with good hemostasis and a majority clean, viable tissue at wound base. PT covered exposed structures with Giuliana Ag collagen and white foam. PT applied NPWT to help increase granulation formation, decrease bioburden, and promote optimal wound healing environment. PT anticipating next wound vac dressing  change in 2-3 days.  -     Row Name 01/07/23 0846          Positioning and Restraints    Pre-Treatment Position in bed  -     Post Treatment Position bed  -     In Bed supine;call light within reach;encouraged to call for assist;with nsg;with other staff  -     Row Name 01/07/23 0846          Therapy Assessment/Plan (PT)    Patient/Family Therapy Goals Statement (PT) Wound healing  -     PT Diagnosis (PT) R 2nd toe transmetatarsal amputation.  -     Rehab Potential (PT) good, to achieve stated therapy goals  -     Criteria for Skilled Interventions Met (PT) yes;meets criteria;skilled treatment is necessary  -     Row Name 01/07/23 0846          PT Evaluation Complexity    History, PT Evaluation Complexity 1-2 personal factors and/or comorbidities  -     Examination of Body Systems (PT Eval Complexity) total of 3 or more elements  -     Clinical Presentation (PT Evaluation Complexity) evolving  -     Clinical Decision Making (PT Evaluation Complexity) moderate complexity  -     Overall Complexity (PT Evaluation Complexity) moderate complexity  -     Row Name 01/07/23 0846          Therapy Plan Review/Discharge Plan (PT)    Therapy Plan Review (PT) evaluation/treatment results reviewed;care plan/treatment goals reviewed;risks/benefits reviewed;current/potential barriers reviewed;participants voiced agreement with care plan;participants included;patient  -     Row Name 01/07/23 0846          Physical Therapy Goals    Wound Care Goal Selection (PT) wound care, PT goal 2;wound care, PT goal 1  -[facility] Name 01/07/23 0846          Wound Care Goal 1 (PT)    Wound Care Goal 1 (PT) Demonstrate 75% granulation of wound base to promote optimal wound healing environment.  -     Time Frame (Wound Care Goal 1, PT) long term goal (LTG);10 days  -     Row Name 01/07/23 0846          Wound Care Goal 2 (PT)    Wound Care Goal 2 (PT) Decrease depth of wound by 1cm as evidence of wound healing.  -      Time Frame (Wound Care Goal 2, PT) long term goal (LTG);10 days  -           User Key  (r) = Recorded By, (t) = Taken By, (c) = Cosigned By    Initials Name Provider Type     Rafy Chauhan, PT Physical Therapist    Aicha Thomas, RN Registered Nurse    Destiny Carmichael RN Registered Nurse                  Recommendation and Plan  Planned Therapy Interventions (PT): wound care, patient/family education  Plan of Care Reviewed With: patient   Progress: no change       Progress: no change  Outcome Evaluation: PT wound care initial evaluation complete. Pt presents s/p R 2nd transmetatarsal amputation. PT observed wound with Dr. Raygoza present. Pt's wound with good hemostasis and a majority clean, viable tissue at wound base. PT covered exposed structures with Giuliana Ag collagen and white foam. PT applied NPWT to help increase granulation formation, decrease bioburden, and promote optimal wound healing environment. PT anticipating next wound vac dressing change in 2-3 days.  Plan of Care Reviewed With: patient            Time Calculation   PT Charges     Row Name 01/07/23 0958             Time Calculation    Start Time 0846  -      PT Goal Re-Cert Due Date 01/17/23  -         Untimed Charges    PT Eval/Re-eval Minutes 35  -LH      Wound Care 48994 Neg Press (DME) wound TO 50 sqcm  -      [id number] Pressure wound to 50 sqcm 30  -         Total Minutes    Untimed Charges Total Minutes 65  -LH       Total Minutes 65  -LH            User Key  (r) = Recorded By, (t) = Taken By, (c) = Cosigned By    Initials Name Provider Type     Rafy Chauhan, PT Physical Therapist                  Therapy Charges for Today     Code Description Service Date Service Provider Modifiers Qty    96051751232 HC PT EVAL MOD COMPLEXITY 3 1/7/2023 Rafy Chauhan, PT GP 1    59273347684 HC PT NEG PRESS WOUND TO 50SQCM DME2 1/7/2023 Rafy Chauhan, PT GP 1            PT G-Codes  AM-PAC 6 Clicks Score (PT): 23        Rafy Chauhan, PT  1/7/2023

## 2023-01-07 NOTE — PLAN OF CARE
Goal Outcome Evaluation:  Plan of Care Reviewed With: patient, daughter        Progress: no change  Outcome Evaluation: PT eval performed: Pt presenting s/p 2nd transmetatarsal amputation.  STS performed with CGA, FWW, good adherence to NWB LLE, min-A x2 to SPT to chair with FWW, needed assist for AD management.  if patient's stability improves, potential trial of knee scooter.  Pt is caretaker for , Recommend IPR at d/c.

## 2023-01-08 LAB
BACTERIA SPEC AEROBE CULT: NORMAL
GLUCOSE BLDC GLUCOMTR-MCNC: 263 MG/DL (ref 70–130)
GLUCOSE BLDC GLUCOMTR-MCNC: 294 MG/DL (ref 70–130)
GLUCOSE BLDC GLUCOMTR-MCNC: 326 MG/DL (ref 70–130)
GRAM STN SPEC: NORMAL

## 2023-01-08 PROCEDURE — 63710000001 INSULIN DETEMIR PER 5 UNITS: Performed by: PEDIATRICS

## 2023-01-08 PROCEDURE — 94799 UNLISTED PULMONARY SVC/PX: CPT

## 2023-01-08 PROCEDURE — 25010000002 PIPERACILLIN SOD-TAZOBACTAM PER 1 G: Performed by: INTERNAL MEDICINE

## 2023-01-08 PROCEDURE — 82962 GLUCOSE BLOOD TEST: CPT

## 2023-01-08 PROCEDURE — 99024 POSTOP FOLLOW-UP VISIT: CPT | Performed by: THORACIC SURGERY (CARDIOTHORACIC VASCULAR SURGERY)

## 2023-01-08 PROCEDURE — 99232 SBSQ HOSP IP/OBS MODERATE 35: CPT | Performed by: PEDIATRICS

## 2023-01-08 PROCEDURE — 25010000002 DAPTOMYCIN PER 1 MG: Performed by: INTERNAL MEDICINE

## 2023-01-08 PROCEDURE — 25010000002 HEPARIN (PORCINE) PER 1000 UNITS: Performed by: THORACIC SURGERY (CARDIOTHORACIC VASCULAR SURGERY)

## 2023-01-08 PROCEDURE — 63710000001 INSULIN LISPRO (HUMAN) PER 5 UNITS: Performed by: PEDIATRICS

## 2023-01-08 PROCEDURE — 97605 NEG PRS WND THER DME<=50SQCM: CPT

## 2023-01-08 PROCEDURE — 94660 CPAP INITIATION&MGMT: CPT

## 2023-01-08 RX ORDER — CARBOXYMETHYLCELLULOSE SODIUM 5 MG/ML
SOLUTION/ DROPS OPHTHALMIC 3 TIMES DAILY PRN
COMMUNITY

## 2023-01-08 RX ADMIN — Medication 1 TABLET: at 07:51

## 2023-01-08 RX ADMIN — NYSTATIN: 100000 POWDER TOPICAL at 16:53

## 2023-01-08 RX ADMIN — FOLIC ACID 0.5 MG: 1 TABLET ORAL at 07:49

## 2023-01-08 RX ADMIN — TAZOBACTAM SODIUM AND PIPERACILLIN SODIUM 3.38 G: 375; 3 INJECTION, SOLUTION INTRAVENOUS at 05:32

## 2023-01-08 RX ADMIN — Medication 10 ML: at 21:32

## 2023-01-08 RX ADMIN — HEPARIN SODIUM 5000 UNITS: 5000 INJECTION INTRAVENOUS; SUBCUTANEOUS at 21:31

## 2023-01-08 RX ADMIN — ASPIRIN 81 MG CHEWABLE TABLET 81 MG: 81 TABLET CHEWABLE at 07:50

## 2023-01-08 RX ADMIN — BETAMETHASONE DIPROPIONATE 1 APPLICATION: 0.5 CREAM TOPICAL at 07:47

## 2023-01-08 RX ADMIN — Medication 250 MG: at 07:50

## 2023-01-08 RX ADMIN — Medication 5000 UNITS: at 07:51

## 2023-01-08 RX ADMIN — NYSTATIN: 100000 POWDER TOPICAL at 21:32

## 2023-01-08 RX ADMIN — NYSTATIN: 100000 POWDER TOPICAL at 05:32

## 2023-01-08 RX ADMIN — Medication 250 MG: at 21:31

## 2023-01-08 RX ADMIN — TAZOBACTAM SODIUM AND PIPERACILLIN SODIUM 3.38 G: 375; 3 INJECTION, SOLUTION INTRAVENOUS at 21:31

## 2023-01-08 RX ADMIN — HEPARIN SODIUM 5000 UNITS: 5000 INJECTION INTRAVENOUS; SUBCUTANEOUS at 07:49

## 2023-01-08 RX ADMIN — INSULIN DETEMIR 10 UNITS: 100 INJECTION, SOLUTION SUBCUTANEOUS at 21:31

## 2023-01-08 RX ADMIN — DAPTOMYCIN 500 MG: 500 INJECTION, POWDER, LYOPHILIZED, FOR SOLUTION INTRAVENOUS at 11:33

## 2023-01-08 RX ADMIN — ATORVASTATIN CALCIUM 10 MG: 10 TABLET, FILM COATED ORAL at 07:51

## 2023-01-08 RX ADMIN — INSULIN LISPRO 8 UNITS: 100 INJECTION, SOLUTION INTRAVENOUS; SUBCUTANEOUS at 07:49

## 2023-01-08 RX ADMIN — INSULIN LISPRO 8 UNITS: 100 INJECTION, SOLUTION INTRAVENOUS; SUBCUTANEOUS at 16:52

## 2023-01-08 RX ADMIN — BETAMETHASONE DIPROPIONATE 1 APPLICATION: 0.5 CREAM TOPICAL at 21:32

## 2023-01-08 RX ADMIN — PANTOPRAZOLE SODIUM 40 MG: 40 TABLET, DELAYED RELEASE ORAL at 05:32

## 2023-01-08 RX ADMIN — NYSTATIN 1 APPLICATION: 100000 POWDER TOPICAL at 07:54

## 2023-01-08 RX ADMIN — HYDROCHLOROTHIAZIDE 50 MG: 25 TABLET ORAL at 07:50

## 2023-01-08 RX ADMIN — INSULIN LISPRO 10 UNITS: 100 INJECTION, SOLUTION INTRAVENOUS; SUBCUTANEOUS at 11:38

## 2023-01-08 RX ADMIN — CETIRIZINE HYDROCHLORIDE 5 MG: 10 TABLET, FILM COATED ORAL at 07:57

## 2023-01-08 RX ADMIN — TAZOBACTAM SODIUM AND PIPERACILLIN SODIUM 3.38 G: 375; 3 INJECTION, SOLUTION INTRAVENOUS at 13:12

## 2023-01-08 NOTE — PLAN OF CARE
Goal Outcome Evaluation:  Plan of Care Reviewed With: patient        Progress: no change  Outcome Evaluation: Wound vac check complete. Vac remains intact with no issues noted, approximately 10mL serosanguineous drainage in canister. Pt due for next wound vac dressing change in 1-2 days.

## 2023-01-08 NOTE — PLAN OF CARE
Problem: Adult Inpatient Plan of Care  Goal: Plan of Care Review  Outcome: Ongoing, Progressing  Goal: Patient-Specific Goal (Individualized)  Outcome: Ongoing, Progressing  Goal: Absence of Hospital-Acquired Illness or Injury  Outcome: Ongoing, Progressing  Intervention: Identify and Manage Fall Risk  Description: Perform standard risk assessment on admission using a validated tool or comprehensive approach appropriate to the patient; reassess fall risk frequently, with change in status or transfer to another level of care.  Communicate fall injury risk to interprofessional healthcare team.  Determine need for increased observation, equipment and environmental modification, such as low bed, signage and supportive, nonskid footwear.  Adjust safety measures to individual developmental age, stage and identified risk factors.  Reinforce the importance of safety and physical activity with patient and family.  Perform regular intentional rounding to assess need for position change, pain assessment and personal needs, including assistance with toileting.  Recent Flowsheet Documentation  Taken 1/8/2023 0400 by Jeannette Miles, RN  Safety Promotion/Fall Prevention:   activity supervised   assistive device/personal items within reach   clutter free environment maintained   fall prevention program maintained   lighting adjusted   mobility aid in reach   nonskid shoes/slippers when out of bed   room organization consistent   safety round/check completed  Taken 1/8/2023 0200 by Jeannette Miles, RN  Safety Promotion/Fall Prevention:   activity supervised   assistive device/personal items within reach   clutter free environment maintained   fall prevention program maintained   lighting adjusted   mobility aid in reach   nonskid shoes/slippers when out of bed   room organization consistent   safety round/check completed  Taken 1/8/2023 0000 by Jeannette Miles, RN  Safety Promotion/Fall Prevention:   activity supervised   assistive  device/personal items within reach   clutter free environment maintained   fall prevention program maintained   lighting adjusted   mobility aid in reach   nonskid shoes/slippers when out of bed   room organization consistent   safety round/check completed  Taken 1/7/2023 2200 by Jeannette Miles RN  Safety Promotion/Fall Prevention:   activity supervised   assistive device/personal items within reach   clutter free environment maintained   fall prevention program maintained   lighting adjusted   mobility aid in reach   nonskid shoes/slippers when out of bed   room organization consistent   safety round/check completed  Taken 1/7/2023 2000 by Jeannette Miles RN  Safety Promotion/Fall Prevention:   activity supervised   assistive device/personal items within reach   clutter free environment maintained   fall prevention program maintained   lighting adjusted   mobility aid in reach   nonskid shoes/slippers when out of bed   room organization consistent   safety round/check completed  Intervention: Prevent Skin Injury  Description: Perform a screening for skin injury risk, such as pressure or moisture associated skin damage on admission and at regular intervals throughout hospital stay.  Keep all areas of skin (especially folds) clean and dry.  Maintain adequate skin hydration.  Relieve and redistribute pressure and protect bony prominences; implement measures based on patient-specific risk factors.  Match turning and repositioning schedule to clinical condition.  Encourage weight shift frequently; assist with reposition if unable to complete independently.  Float heels off bed; avoid pressure on the Achilles tendon.  Keep skin free from extended contact with medical devices.  Encourage functional activity and mobility, as early as tolerated.  Use aids (e.g., slide boards, mechanical lift) during transfer.  Recent Flowsheet Documentation  Taken 1/8/2023 0400 by Jeannette Miles RN  Body Position: position changed  independently  Taken 1/8/2023 0200 by Jeannette Miles RN  Body Position: position changed independently  Taken 1/8/2023 0000 by Jeannette Miles RN  Body Position: position changed independently  Taken 1/7/2023 2200 by Jeannette Miles RN  Body Position: position changed independently  Taken 1/7/2023 2000 by Jeannette Miles RN  Body Position: position changed independently  Skin Protection:   adhesive use limited   tubing/devices free from skin contact   transparent dressing maintained   skin-to-skin areas padded   skin-to-device areas padded  Intervention: Prevent and Manage VTE (Venous Thromboembolism) Risk  Description: Assess for VTE (venous thromboembolism) risk.  Encourage and assist with early ambulation.  Initiate and maintain compression or other therapy, as indicated, based on identified risk in accordance with organizational protocol and provider order.  Encourage both active and passive leg exercises while in bed, if unable to ambulate.  Recent Flowsheet Documentation  Taken 1/8/2023 0400 by Jeannette Miles RN  Activity Management: activity adjusted per tolerance  Taken 1/8/2023 0200 by Jeannette Miles RN  Activity Management: activity adjusted per tolerance  Taken 1/8/2023 0000 by Jeannette Miles RN  Activity Management: activity adjusted per tolerance  Taken 1/7/2023 2200 by Jeannette Miles RN  Activity Management: activity adjusted per tolerance  Taken 1/7/2023 2000 by Jeannette Miles RN  Activity Management: activity adjusted per tolerance  Intervention: Prevent Infection  Description: Maintain skin and mucous membrane integrity; promote hand, oral and pulmonary hygiene.  Optimize fluid balance, nutrition, sleep and glycemic control to maximize infection resistance.  Identify potential sources of infection early to prevent or mitigate progression of infection (e.g., wound, lines, devices).  Evaluate ongoing need for invasive devices; remove promptly when no longer indicated.  Recent Flowsheet  Documentation  Taken 1/7/2023 2000 by Jeannette Miles, RN  Infection Prevention:   environmental surveillance performed   equipment surfaces disinfected   hand hygiene promoted   personal protective equipment utilized   rest/sleep promoted  Goal: Optimal Comfort and Wellbeing  Outcome: Ongoing, Progressing  Goal: Readiness for Transition of Care  Outcome: Ongoing, Progressing   Goal Outcome Evaluation:

## 2023-01-08 NOTE — PROGRESS NOTES
[facility] Medicine Services  PROGRESS NOTE    Patient Name: Maura Leon  : 1960  MRN: [id number]    Date of Admission: 2023  Primary Care Physician: Emanuel Fuentes DPM    Subjective   Subjective     CC:  Diabetic ulcer    HPI:  Patient is overall doing well.  Denies any pain since she cannot feel her feet.  Tolerating her meals.      ROS:  Gen- No fevers, chills  CV- No chest pain, palpitations  Resp- No cough, dyspnea  GI- No N/V/D, abd pain        Objective   Objective     Vital Signs:   Temp:  [97.9 °F (36.6 °C)-99.6 °F (37.6 °C)] 97.9 °F (36.6 °C)  Heart Rate:  [73-95] 73  Resp:  [16-18] 18  BP: ()/(57-65) 98/57     Physical Exam:  Constitutional: No acute distress, awake, alert, resting in bed.  Respiratory: Clear to auscultation bilaterally, respiratory effort normal on room air  Cardiovascular: RRR, no murmurs, rubs, or gallops  Gastrointestinal: Positive bowel sounds, soft, nontender, nondistended  Musculoskeletal: No bilateral ankle edema, wound VAC in place on right foot  Psychiatric: Appropriate affect, cooperative  Neurologic: Oriented x 3, strength symmetric in all extremities, Cranial Nerves grossly intact to confrontation, speech clear        Results Reviewed:  LAB RESULTS:      Lab 23  1755   WBC 13.24* 16.19* 20.01*   HEMOGLOBIN 11.7* 11.5* 13.1   HEMATOCRIT 36.0 34.2 39.5   PLATELETS 275 287 367   NEUTROS ABS 11.18* 13.72* 17.97*   IMMATURE GRANS (ABS) 0.08* 0.12* 0.08*   LYMPHS ABS 0.84 1.05 0.90   MONOS ABS 0.96* 1.20* 0.97*   EOS ABS 0.12 0.03 0.01   MCV 97.0 94.0 94.5   SED RATE  --   --  74*   CRP  --   --  12.67*   PROCALCITONIN  --   --  0.35*   LACTATE  --   --  1.9         Lab 23  0353 23/23  1755   SODIUM 135* 137 135*   POTASSIUM 3.9 3.1* 3.6   CHLORIDE 102 99 95*   CO2 19.0* 27.0 27.0   ANION GAP 14.0 11.0 13.0   BUN 16 14 16   CREATININE 0.96 0.77 0.94   EGFR 67.5 87.9 69.2    GLUCOSE 211* 189* 364*   CALCIUM 8.8 9.3 9.7   MAGNESIUM  --  1.9  --    HEMOGLOBIN A1C  --  8.20*  --          Lab 01/05/23  1755   TOTAL PROTEIN 8.3   ALBUMIN 4.3   GLOBULIN 4.0   ALT (SGPT) 44*   AST (SGOT) 25   BILIRUBIN 1.2   ALK PHOS 113                 Lab 01/06/23  1155   ABO TYPING O   RH TYPING Positive   ANTIBODY SCREEN Negative         Brief Urine Lab Results     None          Microbiology Results Abnormal     Procedure Component Value - Date/Time    Tissue / Bone Culture - Tissue, Toe, Right [[id number]] Collected: 01/06/23 1832    Lab Status: Preliminary result Specimen: Tissue from Toe, Right Updated: 01/08/23 0747     Tissue Culture No growth at 2 days     Gram Stain No WBCs or organisms seen    Wound Culture - Swab, Foot, Right [[id number]] Collected: 01/05/23 2201    Lab Status: Final result Specimen: Swab from Foot, Right Updated: 01/08/23 0730     Wound Culture Moderate growth (3+) Normal Skin Kayli     Gram Stain No WBCs or organisms seen    Blood Culture - Blood, Arm, Left [[id number]]  (Normal) Collected: 01/05/23 2105    Lab Status: Preliminary result Specimen: Blood from Arm, Left Updated: 01/07/23 2131     Blood Culture No growth at 2 days    Blood Culture - Blood, Arm, Left [[id number]]  (Normal) Collected: 01/05/23 1758    Lab Status: Preliminary result Specimen: Blood from Arm, Left Updated: 01/07/23 1815     Blood Culture No growth at 2 days          No radiology results from the last 24 hrs        I have reviewed the medications:  Scheduled Meds:aspirin, 81 mg, Oral, Daily  atorvastatin, 10 mg, Oral, Daily  betamethasone (augmented), 1 application, Topical, BID  Calcium Carb-Cholecalciferol, 1 tablet, Oral, Daily  cetirizine, 5 mg, Oral, Daily  DAPTOmycin, 6 mg/kg (Adjusted), Intravenous, Q24H  folic acid, 0.5 mg, Oral, Daily  heparin (porcine), 5,000 Units, Subcutaneous, Q12H  hydroCHLOROthiazide, 50 mg, Oral, Daily  insulin lispro, 0-14 Units, Subcutaneous, TID AC  nystatin, , Topical,  Q8H  pantoprazole, 40 mg, Oral, Q AM  piperacillin-tazobactam, 3.375 g, Intravenous, Q8H  saccharomyces boulardii, 250 mg, Oral, BID  sodium chloride, 10 mL, Intravenous, Q12H  vitamin D3, 5,000 Units, Oral, Daily      Continuous Infusions:lactated ringers, 9 mL/hr, Last Rate: 9 mL/hr (01/06/23 1400)  sodium chloride, 50 mL/hr, Last Rate: 50 mL/hr (01/07/23 1121)      PRN Meds:.•  [DISCONTINUED] acetaminophen **OR** acetaminophen **OR** acetaminophen  •  acetaminophen  •  dextrose  •  dextrose  •  glucagon (human recombinant)  •  HYDROcodone-acetaminophen  •  HYDROmorphone **AND** naloxone  •  Morphine **AND** naloxone  •  ondansetron **OR** ondansetron  •  potassium chloride **OR** potassium chloride **OR** potassium chloride  •  sodium chloride  •  sodium chloride    Assessment & Plan   Assessment & Plan     Active Hospital Problems    Diagnosis  POA   • **Sepsis ([facility]) [A41.9]  Yes   • Ulcer of right foot ([facility]) [L97.519]  Yes   • Type 2 diabetes mellitus ([facility]) [E11.9]  Yes   • Psoriatic arthritis ([facility]) [L40.50]  Yes   • Immunocompromised (HCC) [D84.9]  Yes   • GERD (gastroesophageal reflux disease) [K21.9]  Yes   • Cellulitis of right foot [L03.115]  Unknown      Resolved Hospital Problems   No resolved problems to display.        Brief Hospital Course to date:  62 year old female with history of type 2 diabetes, psoriatic arthritis on immunosuppressive medications who presents with a right foot ulcer.  Patient was noted to have a leukocytosis of 20,000, and elevated procalcitonin and elevated CRP.  X-ray of right foot showed soft tissue swelling throughout the forefoot and midfoot jesting cellulitis, suggestion of gas production the soft tissues of the first digit and extending towards the second digit. MRI of the right foot no abscess or osteomyelitis.       Sepsis secondary to right foot ulcer with cellulitis in the setting of type 2 diabetes, Immunocompromised state  -Continue dapto and zosyn.  - MRSA  +  -Wound culture with alpha strep  -S/p R 2nd toe transmetatarsal amputation with Dr. Raygoza  -Hold methotrexate  -ID and surg recs appreciated      Diabetes mellitus type 2  -Hemoglobin A1c noted to be 8.2  -Continue sliding scale insulin, add levemir 10u qhs for better control    Acute anemia  -H/H stable, monitor for any bleeding.     Psoriatic arthritis   -on methotrexate: hold for now      GERD  -PPI      Hyperlipidemia  -continue statin      DVT prophylaxis:  scds to LLE      Expected Discharge Location and Transportation: home vs rehab  Expected Discharge   Expected Discharge Date and Time     Expected Discharge Date Expected Discharge Time    Jan 12, 2023            DVT prophylaxis:  Medical and mechanical DVT prophylaxis orders are present.     AM-PAC 6 Clicks Score (PT): 18 (01/08/23 0800)    CODE STATUS:   Code Status and Medical Interventions:   Ordered at: 01/05/23 7024     Level Of Support Discussed With:    Patient     Code Status (Patient has no pulse and is not breathing):    CPR (Attempt to Resuscitate)     Medical Interventions (Patient has pulse or is breathing):    Full Support     This patient's problems and plans were partially entered by my partner and updated as appropriate by me 01/08/23. Today is my first day evaluating this patient's active medical problems. I Personally reviewed chart and adjusted note to reflect daily changes in management/clinical condition. Copied text in this note has been reviewed and is accurate as of 01/08/23.       Isela Jasmine MD  01/08/23

## 2023-01-08 NOTE — THERAPY WOUND CARE TREATMENT
Acute Care - Wound/Debridement Treatment Note  [facility]     Patient Name: Maura Leon  : 1960  MRN: [id number]  Today's Date: 2023                Admit Date: 2023    Visit Dx:    ICD-10-CM ICD-9-CM   1. Cellulitis of right foot  L03.115 682.7   2. History of diabetes mellitus, type II  Z86.39 V12.29       Patient Active Problem List   Diagnosis   • Sepsis (HCC)   • Ulcer of right foot (HCC)   • Type 2 diabetes mellitus (HCC)   • Psoriatic arthritis (HCC)   • Immunocompromised (HCC)   • GERD (gastroesophageal reflux disease)   • Cellulitis of right foot        Past Medical History:   Diagnosis Date   • Arthritis    • Diabetes mellitus (HCC)    • GERD (gastroesophageal reflux disease)    • Irritable bowel    • Sleep apnea         Past Surgical History:   Procedure Laterality Date   • TOE SURGERY Right 2022           Wound 23 2315 Right posterior plantar Diabetic Ulcer (Active)   Dressing Appearance open to air 23   Closure None 23   Base dressing in place, unable to visualize 23       Wound 23 1733 Right anterior foot Incision (Active)   Dressing Appearance intact;dry 23   Base dressing in place, unable to visualize 23   Periwound Care dry periwound area maintained 23   Wound Output (mL) 10 23       NPWT (Negative Pressure Wound Therapy) 23 0900 R 2nd toe amputation site (Active)   Therapy Setting continuous therapy 23   Dressing unable to visualize 23   Pressure Setting 150 mmHg 23         WOUND DEBRIDEMENT                     PT Assessment (last 12 hours)     PT Evaluation and Treatment     Row Name 23          Physical Therapy Time and Intention    Subjective Information no complaints  -     Document Type therapy note (daily note);wound care  -     Mode of Treatment physical therapy;individual therapy  -     Row Name 23          Pain     Pretreatment Pain Rating 0/10 - no pain  -LH     Posttreatment Pain Rating 0/10 - no pain  -LH     Row Name 01/08/23 0823          Cognition    Affect/Mental Status (Cognition) WFL  -     Orientation Status (Cognition) oriented x 4  -LH     Row Name             Wound 01/05/23 2315 Right posterior plantar Diabetic Ulcer    Wound - Properties Group Placement Date: 01/05/23  -DP Placement Time: 2315  -DP Present on Hospital Admission: Y  -DP Side: Right  -DP Orientation: posterior  -DP Location: plantar  -DP Primary Wound Type: Diabetic ulc  -DP    Retired Wound - Properties Group Placement Date: 01/05/23  -DP Placement Time: 2315  -DP Present on Hospital Admission: Y  -DP Side: Right  -DP Orientation: posterior  -DP Location: plantar  -DP Primary Wound Type: Diabetic ulc  -DP    Retired Wound - Properties Group Date first assessed: 01/05/23  -DP Time first assessed: 2315  -DP Present on Hospital Admission: Y  -DP Side: Right  -DP Location: plantar  -DP Primary Wound Type: Diabetic ulc  -DP    Row Name 01/08/23 0823          Wound 01/06/23 1733 Right anterior foot Incision    Wound - Properties Group Placement Date: 01/06/23  -EQ Placement Time: 1733  -EQ Present on Hospital Admission: N  -EQ Side: Right  -EQ Orientation: anterior  -EQ Location: foot  -EQ Primary Wound Type: Incision  -EQ    Dressing Appearance intact;dry  -LH     Base dressing in place, unable to visualize  -LH     Wound Output (mL) 10  -LH     Retired Wound - Properties Group Placement Date: 01/06/23  -EQ Placement Time: 1733  -EQ Present on Hospital Admission: N  -EQ Side: Right  -EQ Orientation: anterior  -EQ Location: foot  -EQ Primary Wound Type: Incision  -EQ    Retired Wound - Properties Group Date first assessed: 01/06/23  -EQ Time first assessed: 1733  -EQ Present on Hospital Admission: N  -EQ Side: Right  -EQ Location: foot  -EQ Primary Wound Type: Incision  -EQ    Row Name 01/08/23 0823          NPWT (Negative Pressure Wound Therapy)  01/07/23 0900 R 2nd toe amputation site    NPWT (Negative Pressure Wound Therapy) - Properties Group Placement Date: 01/07/23  - Placement Time: 0900 -LH Location: R 2nd toe amputation site  -    Therapy Setting continuous therapy  -     Pressure Setting 150 mmHg  -     Retired NPWT (Negative Pressure Wound Therapy) - Properties Group Placement Date: 01/07/23  - Placement Time: 0900  -LH Location: R 2nd toe amputation site  -    Retired NPWT (Negative Pressure Wound Therapy) - Properties Group Placement Date: 01/07/23  - Placement Time: 0900  -LH Location: R 2nd toe amputation site  -LH    Row Name 01/08/23 0823          Coping    Observed Emotional State calm;cooperative  -     Verbalized Emotional State acceptance  -     Trust Relationship/Rapport care explained;questions answered  -     Row Name 01/08/23 0823          Plan of Care Review    Plan of Care Reviewed With patient  -     Progress no change  -     Outcome Evaluation Wound vac check complete. Vac remains intact with no issues noted, approximately 10mL serosanguineous drainage in canister. Pt due for next wound vac dressing change in 1-2 days.  -     Row Name 01/08/23 0823          Positioning and Restraints    Pre-Treatment Position in bed  -     Post Treatment Position bed  -     In Bed supine;call light within reach;encouraged to call for assist  -           User Key  (r) = Recorded By, (t) = Taken By, (c) = Cosigned By    Initials Name Provider Type     Rafy Chauhan, PT Physical Therapist    Aicha Thomas, RN Registered Nurse    Destiny Carmichael RN Registered Nurse              Physical Therapy Education     Title: PT OT SLP Therapies (Done)     Topic: Physical Therapy (Done)     Point: Mobility training (Done)     Learning Progress Summary           Patient Acceptance, E, VU,NR by KG at 1/7/2023 2389                   Point: Home exercise program (Done)     Learning Progress Summary           Patient  Acceptance, E, VU,NR by KG at 1/7/2023 1529                   Point: Body mechanics (Done)     Learning Progress Summary           Patient Acceptance, E, VU,NR by KG at 1/7/2023 1529                   Point: Precautions (Done)     Learning Progress Summary           Patient Acceptance, E, VU,NR by KG at 1/7/2023 1529                               User Key     Initials Effective Dates Name Provider Type Discipline     01/04/23 -  Kaity Narvaez Physical Therapist PT                Recommendation and Plan  Anticipated Equipment Needs at Discharge (PT): front wheeled walker  Anticipated Discharge Disposition (PT): inpatient rehabilitation facility  Planned Therapy Interventions (PT): wound care, patient/family education  Therapy Frequency (PT): daily  Plan of Care Reviewed With: patient   Progress: no change       Progress: no change  Outcome Evaluation: Wound vac check complete. Vac remains intact with no issues noted, approximately 10mL serosanguineous drainage in canister. Pt due for next wound vac dressing change in 1-2 days.  Plan of Care Reviewed With: patient            Time Calculation   PT Charges     Row Name 01/08/23 0946             Time Calculation    Start Time 0823  -      PT Goal Re-Cert Due Date 01/17/23  -         Untimed Charges    25330-Ana Pressure wound to 50 sqcm 10  -LH         Total Minutes    Untimed Charges Total Minutes 10  -LH       Total Minutes 10  -LH            User Key  (r) = Recorded By, (t) = Taken By, (c) = Cosigned By    Initials Name Provider Type     Rafy Chauhan, PT Physical Therapist                  Therapy Charges for Today     Code Description Service Date Service Provider Modifiers Qty    48781660599 HC PT EVAL MOD COMPLEXITY 3 1/7/2023 Rafy Chauhan, PT GP 1    83565113697 HC PT NEG PRESS WOUND TO 50SQCM DME2 1/7/2023 Rafy Chauhan, PT GP 1    [id number] HC PT NEG PRESS WOUND TO 50SQCM DME1 1/8/2023 Rafy Chauhan, PT  1            PT  G-Codes  Outcome Measure Options: AM-PAC 6 Clicks Basic Mobility (PT)  AM-PAC 6 Clicks Score (PT): 18       Rafy Chauhan, PT  1/8/2023

## 2023-01-08 NOTE — PROGRESS NOTES
"Maura Leon  1960  [id number]     Chief Complaint:  Right foot infection    Reason for Consultation: right foot infection    History of present illness:     Patient is a 62 y.o.  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  Follows with podiatry in [place]; she has diabetes with chronic sensory neuropathy and has not noticed any recent exposures.  No specific blunt force or penetrating trauma.  She has not stepped on anything she knows of.  No animal insect or arthropod bite.  No fresh/brackish/salt water exposure.  No prior history MRSA VRE C. difficile or ESBL/KP C/CRE organisms.    She is admitted on January 5, 2023 with acute deterioration at the right foot.  She had just noticed a wound on the plantar side of her foot when she had spontaneous drainage and does not know how long it had been there.  She has no pain because of her sensory neuropathy.  She developed acute redness/swelling and was told to come to the emergency room by her podiatrist.  She was noted to have fever/leukocytosis with elevated procalcitonin, sepsis per admission H&P and abnormal MRI with plantar foot wound/ulceration over the second MTP joint with adjacent soft tissue and inflammatory phlegmon but no focal fluid collection per radiology and no osteomyelitis per radiology.  Initial x-ray did raise concern for gas in the soft tissue per radiology.  Dr. Raygoza has seen the patient and he is planning for surgical debridement on January 6 1/6/23 surgery by Dr Raygoza:  \"Pre-op Diagnosis: Gas gangrene of the right foot involving the plantar surface of the right second toe metatarsal head area  Post-op Diagnosis:   Same with extensive necrotizing fasciitis of the right second toe involving the flexor and extensor tendons.\"    \"Procedure(s): Right second " "toe transmetatarsal amputation through the distal third of the second metatarsal bone with extensive sharp soft tissue debridement.  Wound left open to heal by secondary intent/wound VAC therapy\"    1/6 MRSA surveillance culture positive    1/8/23 operative cultures with alpha strep so far.  Constipated; No fever;  postop no pain at the right foot with her sensory neuropathy.  She has redness/swelling unclear duration exactly but evolving over days.  She denies any other specific focal complaints    No headache photophobia or neck stiffness.  No shortness of breath cough or hemoptysis.  No nausea vomiting diarrhea or abdominal pain.  No dysuria hematuria or pyuria.  No skin rash    Review of Systems    1/8/23    Constitutional-- No  chills or sweats.  Appetite diminished with fatigue.  Heent-- No new vision, hearing or throat complaints.  No epistaxis or oral sores.  Denies odynophagia or dysphagia.  No flashers, floaters or eye pain. No odynophagia or dysphagia. No headache, photophobia or neck stiffness.  CV-- No chest pain, palpitation or syncope  Resp-- No SOB/cough/Hemoptysis  GI- No nausea, vomiting, or diarrhea.  No hematochezia, melena, or hematemesis. Denies jaundice or chronic liver disease.  -- No dysuria, hematuria, or flank pain.  Denies hesitancy, urgency or flank pain.  Lymph- no swollen lymph nodes in neck/axilla or groin.   Heme- No active bruising or bleeding; no Hx of DVT or PE.  MS-- no acute swelling or pain in the bones or joints of arms/legs aside from above.  No new back pain.  Neuro-- No acute focal weakness or numbness in the arms or legs.  No seizures.    Full 12 point review of systems reviewed and negative otherwise for acute complaints, except for above    Physical Exam: Nursing/chaperone present  Vital Signs   BP 98/57 (BP Location: Right arm, Patient Position: Lying)   Pulse 73   Temp 97.9 °F (36.6 °C) (Oral)   Resp 18   Ht 167.6 cm (66\")   Wt 116 kg (255 lb)   SpO2 99%   BMI " 41.16 kg/m²     GENERAL: Awake and alert, in no acute distress.   HEENT: Normocephalic, atraumatic.   . No conjunctival injection. No icterus. Oropharynx clear without evidence of thrush or exudate. No evidence of peridontal disease.    NECK: Supple without nuchal rigidity. No mass.  LYMPH: No cervical, axillary or inguinal lymphadenopathy.  HEART: RRR; No murmur, rubs, gallops.   LUNGS: Clear to auscultation bilaterally without wheezing, rales, rhonchi. Normal respiratory effort. Nonlabored. No dullness.  ABDOMEN: Soft, nontender, nondistended. Positive bowel sounds. No rebound or guarding. NO mass or HSM.  EXT:  No cyanosis, clubbing . No cord.  See below   MSK: FROM without joint effusions noted arms/legs.    SKIN: Warm and dry without cutaneous eruptions on Inspection/palpation.  See below  NEURO: Oriented to PPT. No focal deficits on motor/sensory exam at arms/legs.    Right foot surgical site noted;  Redness at foot no progression.  No discrete mass bulge or fluctuance.  No crepitus or bulla.  Partial right hallux amputation noted with healed surgical site.  Onychomycosis noted with thickened nails; no new purulence    No peripheral stigmata/phenomena of endocarditis    IV without obvious redness or drainage    Laboratory Data    Results from last 7 days   Lab Units 01/07/23  0353 01/06/23  0411 01/05/23  1755   WBC 10*3/mm3 13.24* 16.19* 20.01*   HEMOGLOBIN g/dL 11.7* 11.5* 13.1   HEMATOCRIT % 36.0 34.2 39.5   PLATELETS 10*3/mm3 275 287 367     Results from last 7 days   Lab Units 01/07/23  0353   SODIUM mmol/L 135*   POTASSIUM mmol/L 3.9   CHLORIDE mmol/L 102   CO2 mmol/L 19.0*   BUN mg/dL 16   CREATININE mg/dL 0.96   GLUCOSE mg/dL 211*   CALCIUM mg/dL 8.8     Results from last 7 days   Lab Units 01/05/23  1755   ALK PHOS U/L 113   BILIRUBIN mg/dL 1.2   ALT (SGPT) U/L 44*   AST (SGOT) U/L 25     Results from last 7 days   Lab Units 01/05/23  1755   SED RATE mm/hr 74*     Results from last 7 days   Lab Units  01/05/23  1755   CRP mg/dL 12.67*       Estimated Creatinine Clearance: 79.7 mL/min (by C-G formula based on SCr of 0.96 mg/dL).      Microbiology:      Radiology:  Imaging Results (Last 72 Hours)     Procedure Component Value Units Date/Time    MRI Foot Right With & Without Contrast [[id number]] Collected: 01/06/23 0209     Updated: 01/06/23 0413    Narrative:      MRI OF THE RIGHT FOOT WITH AND WITHOUT INTRAVENOUS CONTRAST    HISTORY: Right forefoot pain.    TECHNIQUE: Multiplanar and multisequence MR imaging of the right forefoot was performed without the administration of intravenous gadolinium. Imaging sequences include axial and sagittal T1, axial and sagittal proton density, coronal and sagittal   fat-suppressed proton density, and axial fat-suppressed T2-weighted images. 20 mL of MultiHance was administered.    COMPARISON: None.    FINDINGS:    There is a large amount of diffuse soft tissue swelling of the forefoot that could be a mixture of cellulitis and dependent edema. There is a wound/ulceration over the plantar aspect of the second metatarsal phalangeal joint with some necrosis and poor   enhancement of the plantar soft tissues at this site. There is no abscess there is no soft tissue gas. No evidence of osteomyelitis. Partial amputation of the great toe.          Impression:      1. No abscess. No osteomyelitis.  2. Plantar wound/ulceration over the second metatarsal phalangeal joint with some adjacent soft tissue necrosis an inflammatory phlegmon but no well-defined fluid collection.  3. Diffuse soft tissue swelling that could be a mixture of cellulitis and dependent edema.    Electronically signed by:  Burke Evans M.D.    1/6/2023 2:11 AM Mountain Time    XR Foot 3+ View Right [[id number]] Collected: 01/05/23 2033     Updated: 01/05/23 2039    Narrative:      XR FOOT 3+ VW RIGHT    Date of Exam: 1/5/2023 8:06 PM EST    Indication: DM with foot ulcer; rule out gas in tissue.    Comparison: None  available.    Findings:  Postoperative changes are noted status post prior resection of the great toe at the level of the distal diaphysis of the proximal phalanx. There is soft tissue swelling within the residual soft tissues of the great toe extending into the soft tissues of   the second through fifth digits and surrounding the forefoot. There is also evidence for edema extending towards the midfoot. The findings suggest changes of soft tissue cellulitis. There is suggestion of possible gas production in the soft tissues at   the interdigital space between the first and second metatarsophalangeal joints and extending into the base of the residual stump of the first digit as well as the proximal aspect of the second digit. No definitive cortical erosion or destruction is seen.   There is no definitive evidence for osteomyelitis. Changes of arthropathy are seen throughout the midfoot suggesting developing neuropathic arthropathy or Charcot foot.      Impression:      Impression:  1.Soft tissue swelling throughout the forefoot and midfoot. The findings suggest changes of soft tissue cellulitis.  2.There is suggestion of gas production in the soft tissues of the residual first digit and extending towards the base of the second digit.  3.No definitive evidence for osteomyelitis.    Electronically Signed: Laith Morgan    1/5/2023 8:37 PM EST    Workstation ID: [id number]            Impression:     --Acute sepsis present on admission per medicine notes, in the setting of chronic immunosuppression/psoriatic arthritis and acute deterioration in her right foot with right foot infection most likely source.  Significant leukocytosis with abnormal procalcitonin and abnormal imaging.     Timing/option of threshold including extent for any further debridement/amputation at discretion of Dr. Raygoza; surgery 1/6 as above    --acute /severe right foot infection with necrotizing fasciitis at surgery as above    **Cx with alpha strep  so far    --MRSA surveillance culture positivity    -- Psoriatic arthritis with chronic methotrexate, currently on hold by medicine team.    -- Diabetes with chronic sensory neuropathy.  She understands the importance of protecting her feet.  You need to tightly control blood sugar to give best chance for healing    -- Antibiotic allergies as above to clindamycin, doxycycline, sulfa and fluoroquinolones      PLAN:      -- IV daptomycin/Zosyn    -- Check/review labs cultures and scans    -- Partial history per nursing staff    -- Discussed with microbiology    -- Highly complex set of issues with high risk for further serious morbidity and other serious sequela    -- Discussed with Dr. Raygoza.  Severe infection as outlined above.      **Cx data pending.    Copied text in this note has been reviewed and is accurate as of 01/08/23.        Kevin Abdul MD  1/8/2023

## 2023-01-08 NOTE — PROGRESS NOTES
Cardiothoracic Surgery Progress Note      POD #: 2-transmetatarsal amputation right foot with extensive soft tissue sharp debridement.  Wound left open to heal by secondary intent.     LOS: 3 days      Subjective: Awake alert    Objective:  Vital Signs vital signs below noted T-max past 24 hours 99.6 °F  Temp:  [97.9 °F (36.6 °C)-99.6 °F (37.6 °C)] 97.9 °F (36.6 °C)  Heart Rate:  [73-95] 73  Resp:  [16-18] 18  BP: ()/(57-65) 98/57    Physical Exam:   General Appearance: Oriented x3   Lungs:   Heart:   Skin:   Incision: Wound VAC in place.  Dry dressing-no drainage noted.     Results: White blood cell count 13,200 hematocrit 36% creatinine 0.96 BUN 16  Results from last 7 days   Lab Units 01/07/23  0353   WBC 10*3/mm3 13.24*   HEMOGLOBIN g/dL 11.7*   HEMATOCRIT % 36.0   PLATELETS 10*3/mm3 275     Results from last 7 days   Lab Units 01/07/23  0353   SODIUM mmol/L 135*   POTASSIUM mmol/L 3.9   CHLORIDE mmol/L 102   CO2 mmol/L 19.0*   BUN mg/dL 16   CREATININE mg/dL 0.96   GLUCOSE mg/dL 211*   CALCIUM mg/dL 8.8         Assessment: #1 postop day 2 right second toe transmetatarsal amputation wound left open to heal by secondary intent and closure by wound VAC.  2.  Diabetic neuropathy with plantar surface ulceration metatarsal head area with gas gangrene in the deep soft tissues      Plan: Wound VAC management PT wound care.  Overall medical manage per hospitalist.  Antibiotics per infectious disease.      Javon Raygoza MD - 01/08/23 - 05:52 EST

## 2023-01-09 LAB
ALBUMIN SERPL-MCNC: 3.2 G/DL (ref 3.5–5.2)
ALBUMIN/GLOB SERPL: 1.1 G/DL
ALP SERPL-CCNC: 98 U/L (ref 39–117)
ALT SERPL W P-5'-P-CCNC: 43 U/L (ref 1–33)
ANION GAP SERPL CALCULATED.3IONS-SCNC: 10 MMOL/L (ref 5–15)
AST SERPL-CCNC: 22 U/L (ref 1–32)
BACTERIA SPEC AEROBE CULT: ABNORMAL
BACTERIA SPEC AEROBE CULT: ABNORMAL
BILIRUB SERPL-MCNC: 0.6 MG/DL (ref 0–1.2)
BUN SERPL-MCNC: 15 MG/DL (ref 8–23)
BUN/CREAT SERPL: 15 (ref 7–25)
CALCIUM SPEC-SCNC: 9.2 MG/DL (ref 8.6–10.5)
CHLORIDE SERPL-SCNC: 99 MMOL/L (ref 98–107)
CO2 SERPL-SCNC: 28 MMOL/L (ref 22–29)
CREAT SERPL-MCNC: 1 MG/DL (ref 0.57–1)
DEPRECATED RDW RBC AUTO: 45.7 FL (ref 37–54)
EGFRCR SERPLBLD CKD-EPI 2021: 64.2 ML/MIN/1.73
ERYTHROCYTE [DISTWIDTH] IN BLOOD BY AUTOMATED COUNT: 13.1 % (ref 12.3–15.4)
GLOBULIN UR ELPH-MCNC: 2.9 GM/DL
GLUCOSE BLDC GLUCOMTR-MCNC: 307 MG/DL (ref 70–130)
GLUCOSE BLDC GLUCOMTR-MCNC: 335 MG/DL (ref 70–130)
GLUCOSE BLDC GLUCOMTR-MCNC: 366 MG/DL (ref 70–130)
GLUCOSE BLDC GLUCOMTR-MCNC: 384 MG/DL (ref 70–130)
GLUCOSE SERPL-MCNC: 352 MG/DL (ref 65–99)
GRAM STN SPEC: ABNORMAL
GRAM STN SPEC: ABNORMAL
HCT VFR BLD AUTO: 34.7 % (ref 34–46.6)
HGB BLD-MCNC: 11.4 G/DL (ref 12–15.9)
MCH RBC QN AUTO: 31.8 PG (ref 26.6–33)
MCHC RBC AUTO-ENTMCNC: 32.9 G/DL (ref 31.5–35.7)
MCV RBC AUTO: 96.7 FL (ref 79–97)
PLATELET # BLD AUTO: 313 10*3/MM3 (ref 140–450)
PMV BLD AUTO: 9.7 FL (ref 6–12)
POTASSIUM SERPL-SCNC: 3.9 MMOL/L (ref 3.5–5.2)
PROT SERPL-MCNC: 6.1 G/DL (ref 6–8.5)
RBC # BLD AUTO: 3.59 10*6/MM3 (ref 3.77–5.28)
SODIUM SERPL-SCNC: 137 MMOL/L (ref 136–145)
WBC NRBC COR # BLD: 8.53 10*3/MM3 (ref 3.4–10.8)

## 2023-01-09 PROCEDURE — 94799 UNLISTED PULMONARY SVC/PX: CPT

## 2023-01-09 PROCEDURE — 97165 OT EVAL LOW COMPLEX 30 MIN: CPT

## 2023-01-09 PROCEDURE — 99232 SBSQ HOSP IP/OBS MODERATE 35: CPT | Performed by: NURSE PRACTITIONER

## 2023-01-09 PROCEDURE — 94660 CPAP INITIATION&MGMT: CPT

## 2023-01-09 PROCEDURE — 63710000001 INSULIN LISPRO (HUMAN) PER 5 UNITS: Performed by: NURSE PRACTITIONER

## 2023-01-09 PROCEDURE — 63710000001 INSULIN DETEMIR PER 5 UNITS: Performed by: NURSE PRACTITIONER

## 2023-01-09 PROCEDURE — 25010000002 PIPERACILLIN SOD-TAZOBACTAM PER 1 G: Performed by: INTERNAL MEDICINE

## 2023-01-09 PROCEDURE — 97530 THERAPEUTIC ACTIVITIES: CPT

## 2023-01-09 PROCEDURE — 85027 COMPLETE CBC AUTOMATED: CPT | Performed by: INTERNAL MEDICINE

## 2023-01-09 PROCEDURE — 97605 NEG PRS WND THER DME<=50SQCM: CPT

## 2023-01-09 PROCEDURE — 97110 THERAPEUTIC EXERCISES: CPT

## 2023-01-09 PROCEDURE — 63710000001 INSULIN LISPRO (HUMAN) PER 5 UNITS: Performed by: PEDIATRICS

## 2023-01-09 PROCEDURE — 25010000002 HEPARIN (PORCINE) PER 1000 UNITS: Performed by: THORACIC SURGERY (CARDIOTHORACIC VASCULAR SURGERY)

## 2023-01-09 PROCEDURE — 82962 GLUCOSE BLOOD TEST: CPT

## 2023-01-09 PROCEDURE — 25010000002 DAPTOMYCIN PER 1 MG: Performed by: INTERNAL MEDICINE

## 2023-01-09 PROCEDURE — 80053 COMPREHEN METABOLIC PANEL: CPT | Performed by: INTERNAL MEDICINE

## 2023-01-09 PROCEDURE — 97116 GAIT TRAINING THERAPY: CPT

## 2023-01-09 RX ORDER — INSULIN LISPRO 100 [IU]/ML
3 INJECTION, SOLUTION INTRAVENOUS; SUBCUTANEOUS
Status: DISCONTINUED | OUTPATIENT
Start: 2023-01-09 | End: 2023-01-11

## 2023-01-09 RX ADMIN — PANTOPRAZOLE SODIUM 40 MG: 40 TABLET, DELAYED RELEASE ORAL at 05:10

## 2023-01-09 RX ADMIN — INSULIN DETEMIR 15 UNITS: 100 INJECTION, SOLUTION SUBCUTANEOUS at 21:31

## 2023-01-09 RX ADMIN — CETIRIZINE HYDROCHLORIDE 5 MG: 10 TABLET, FILM COATED ORAL at 09:07

## 2023-01-09 RX ADMIN — Medication 250 MG: at 09:07

## 2023-01-09 RX ADMIN — MINERAL OIL, AND WHITE PETROLATUM: 425; 573 OINTMENT OPHTHALMIC at 23:57

## 2023-01-09 RX ADMIN — Medication 5000 UNITS: at 09:07

## 2023-01-09 RX ADMIN — NYSTATIN: 100000 POWDER TOPICAL at 05:10

## 2023-01-09 RX ADMIN — SODIUM CHLORIDE 50 ML/HR: 4.5 INJECTION, SOLUTION INTRAVENOUS at 05:12

## 2023-01-09 RX ADMIN — NYSTATIN: 100000 POWDER TOPICAL at 21:34

## 2023-01-09 RX ADMIN — INSULIN LISPRO 3 UNITS: 100 INJECTION, SOLUTION INTRAVENOUS; SUBCUTANEOUS at 16:51

## 2023-01-09 RX ADMIN — HEPARIN SODIUM 5000 UNITS: 5000 INJECTION INTRAVENOUS; SUBCUTANEOUS at 09:07

## 2023-01-09 RX ADMIN — INSULIN LISPRO 12 UNITS: 100 INJECTION, SOLUTION INTRAVENOUS; SUBCUTANEOUS at 09:07

## 2023-01-09 RX ADMIN — TAZOBACTAM SODIUM AND PIPERACILLIN SODIUM 3.38 G: 375; 3 INJECTION, SOLUTION INTRAVENOUS at 11:56

## 2023-01-09 RX ADMIN — HEPARIN SODIUM 5000 UNITS: 5000 INJECTION INTRAVENOUS; SUBCUTANEOUS at 21:31

## 2023-01-09 RX ADMIN — INSULIN LISPRO 10 UNITS: 100 INJECTION, SOLUTION INTRAVENOUS; SUBCUTANEOUS at 16:50

## 2023-01-09 RX ADMIN — TAZOBACTAM SODIUM AND PIPERACILLIN SODIUM 3.38 G: 375; 3 INJECTION, SOLUTION INTRAVENOUS at 05:10

## 2023-01-09 RX ADMIN — SODIUM CHLORIDE 50 ML/HR: 4.5 INJECTION, SOLUTION INTRAVENOUS at 09:10

## 2023-01-09 RX ADMIN — NYSTATIN: 100000 POWDER TOPICAL at 11:56

## 2023-01-09 RX ADMIN — ASPIRIN 81 MG CHEWABLE TABLET 81 MG: 81 TABLET CHEWABLE at 09:06

## 2023-01-09 RX ADMIN — Medication 250 MG: at 21:31

## 2023-01-09 RX ADMIN — DAPTOMYCIN 500 MG: 500 INJECTION, POWDER, LYOPHILIZED, FOR SOLUTION INTRAVENOUS at 11:56

## 2023-01-09 RX ADMIN — ATORVASTATIN CALCIUM 10 MG: 10 TABLET, FILM COATED ORAL at 09:07

## 2023-01-09 RX ADMIN — INSULIN LISPRO 3 UNITS: 100 INJECTION, SOLUTION INTRAVENOUS; SUBCUTANEOUS at 11:56

## 2023-01-09 RX ADMIN — INSULIN LISPRO 12 UNITS: 100 INJECTION, SOLUTION INTRAVENOUS; SUBCUTANEOUS at 11:57

## 2023-01-09 RX ADMIN — FOLIC ACID 0.5 MG: 1 TABLET ORAL at 09:07

## 2023-01-09 RX ADMIN — TAZOBACTAM SODIUM AND PIPERACILLIN SODIUM 3.38 G: 375; 3 INJECTION, SOLUTION INTRAVENOUS at 21:31

## 2023-01-09 RX ADMIN — Medication 10 ML: at 09:08

## 2023-01-09 RX ADMIN — HYDROCHLOROTHIAZIDE 50 MG: 25 TABLET ORAL at 09:07

## 2023-01-09 RX ADMIN — Medication 1 TABLET: at 09:06

## 2023-01-09 RX ADMIN — BETAMETHASONE DIPROPIONATE 1 APPLICATION: 0.5 CREAM TOPICAL at 09:08

## 2023-01-09 NOTE — THERAPY TREATMENT NOTE
Patient Name: Maura Leon  : 1960    MRN: [id number]                              Today's Date: 2023       Admit Date: 2023    Visit Dx:     ICD-10-CM ICD-9-CM   1. Cellulitis of right foot  L03.115 682.7   2. History of diabetes mellitus, type II  Z86.39 V12.29     Patient Active Problem List   Diagnosis   • Sepsis (HCC)   • Ulcer of right foot (HCC)   • Type 2 diabetes mellitus (HCC)   • Psoriatic arthritis (HCC)   • Immunocompromised (HCC)   • GERD (gastroesophageal reflux disease)   • Cellulitis of right foot     Past Medical History:   Diagnosis Date   • Arthritis    • Diabetes mellitus (HCC)    • GERD (gastroesophageal reflux disease)    • Irritable bowel    • Sleep apnea      Past Surgical History:   Procedure Laterality Date   • AMPUTATION DIGIT Right 2023    Procedure: FOOT SURGICAL EXPLORATION WITH TOE AMPUTATION RIGHT;  Surgeon: Javon Raygoza MD;  Location: [facility];  Service: General;  Laterality: Right;   • TOE SURGERY Right 2022      General Information     Row Name 23 1407          Physical Therapy Time and Intention    Document Type therapy note (daily note)  -AE     Mode of Treatment physical therapy  -AE     Row Name 23 1407          General Information    Patient Profile Reviewed yes  -AE     Existing Precautions/Restrictions other (see comments);non-weight bearing  2nd L transmetatarsal amputation; LLE NWB; wound vac  -AE     Barriers to Rehab medically complex  -AE     Row Name 23 1407          Cognition    Orientation Status (Cognition) oriented x 4  -AE     Row Name 23 1407          Safety Issues, Functional Mobility    Safety Issues Affecting Function (Mobility) awareness of need for assistance;insight into deficits/self-awareness;safety precaution awareness;impulsivity;sequencing abilities  -AE     Impairments Affecting Function (Mobility) balance;endurance/activity tolerance;strength;sensation/sensory awareness;postural/trunk control  -AE            User Key  (r) = Recorded By, (t) = Taken By, (c) = Cosigned By    Initials Name Provider Type    AE Brian Horton, PT Physical Therapist               Mobility     Row Name 01/09/23 1409          Bed Mobility    Bed Mobility supine-sit  -AE     Supine-Sit [place] (Bed Mobility) contact guard;1 person assist  -AE     Assistive Device (Bed Mobility) bed rails;head of bed elevated  -AE     Comment, (Bed Mobility) VCs for hand placement and sequencing to improve independence with bed mobility.  -AE     Row Name 01/09/23 1409          Transfers    Comment, (Transfers) VCs for hand placement and sequencing. Pt ble to maintain NWB of LLE while completing STS with RW. Pt impulsive but willing to listen to instruction and learn.  -AE     Row Name 01/09/23 1409          Bed-Chair Transfer    Bed-Chair [place] (Transfers) minimum assist (75% patient effort);1 person assist;verbal cues  -AE     Assistive Device (Bed-Chair Transfers) walker, front-wheeled  -AE     Row Name 01/09/23 1409          Sit-Stand Transfer    Sit-Stand [place] (Transfers) minimum assist (75% patient effort);1 person assist  -AE     Assistive Device (Sit-Stand Transfers) walker, front-wheeled  -AE     Row Name 01/09/23 1409          Gait/Stairs (Locomotion)    [place] Level (Gait) contact guard  -AE     Assistive Device (Gait) walker, knee scooter  -AE     Distance in Feet (Gait) 50  -AE     Deviations/Abnormal Patterns (Gait) bilateral deviations;gait speed decreased  -AE     Bilateral Gait Deviations forward flexed posture  -AE     Comment, (Gait/Stairs) Pt ambulated 50ft with CGA and knee scooter. Pt demo good sequencing and safety awareness while ambulating. Pt became increasingly tired near end of session, requiring min A x1 to move knee scooter for placement before dismounting and returning to chair.  -AE     Row Name 01/09/23 1409          Mobility    Extremity Weight-bearing Status left lower extremity  -AE      Left Lower Extremity (Weight-bearing Status) non weight-bearing (NWB)  -AE           User Key  (r) = Recorded By, (t) = Taken By, (c) = Cosigned By    Initials Name Provider Type    AE Brian Horton PT Physical Therapist               Obj/Interventions     Row Name 01/09/23 1413          Balance    Balance Assessment sitting static balance;sitting dynamic balance;sit to stand dynamic balance;standing static balance;standing dynamic balance  -AE     Static Sitting Balance standby assist  -AE     Dynamic Sitting Balance standby assist  -AE     Position, Sitting Balance unsupported;sitting edge of bed  -AE     Sit to Stand Dynamic Balance minimal assist;1-person assist  -AE     Static Standing Balance contact guard  -AE     Dynamic Standing Balance minimal assist;contact guard  -AE     Position/Device Used, Standing Balance walker, front-wheeled  knee scooter  -AE           User Key  (r) = Recorded By, (t) = Taken By, (c) = Cosigned By    Initials Name Provider Type    AE Brian Horton PT Physical Therapist               Goals/Plan    No documentation.                Clinical Impression     Row Name 01/09/23 1415          Pain    Pretreatment Pain Rating 0/10 - no pain  -AE     Posttreatment Pain Rating 0/10 - no pain  -AE     Row Name 01/09/23 1415          Plan of Care Review    Plan of Care Reviewed With patient  -AE     Progress improving  -AE     Outcome Evaluation Pt improved mobility this session, ambulating 50ft with CGA and knee scooter. Pt continues to require min A for STS and SPT to chair from bed but overall improved mobility. Pt impulsive at times but wiling to listen to instruction and is very attentive to commands. Continue to progress per pt tolerance.  -AE     Row Name 01/09/23 1415          Vital Signs    Pre Systolic BP Rehab 121  -AE     Pre Treatment Diastolic BP 71  -AE     Pretreatment Heart Rate (beats/min) 77  -AE     Pre SpO2 (%) 97  -AE     O2 Delivery Pre Treatment room air  -AE      O2 Delivery Intra Treatment room air  -AE     O2 Delivery Post Treatment room air  -AE     Pre Patient Position Supine  -AE     Intra Patient Position Standing  -AE     Post Patient Position Sitting  -AE     Row Name 01/09/23 1415          Positioning and Restraints    Pre-Treatment Position in bed  -AE     Post Treatment Position chair  -AE     In Chair notified nsg;call light within reach;encouraged to call for assist;exit alarm on;waffle cushion;legs elevated;R waffle boot  -AE           User Key  (r) = Recorded By, (t) = Taken By, (c) = Cosigned By    Initials Name Provider Type    AE Brian Horton PT Physical Therapist               Outcome Measures     Row Name 01/09/23 1418          How much help from another person do you currently need...    Turning from your back to your side while in flat bed without using bedrails? 4  -AE     Moving from lying on back to sitting on the side of a flat bed without bedrails? 3  -AE     Moving to and from a bed to a chair (including a wheelchair)? 3  -AE     Standing up from a chair using your arms (e.g., wheelchair, bedside chair)? 3  -AE     Climbing 3-5 steps with a railing? 2  -AE     To walk in hospital room? 3  -AE     AM-PAC 6 Clicks Score (PT) 18  -AE     Highest level of mobility 6 --> Walked 10 steps or more  -AE     Row Name 01/09/23 1418          Functional Assessment    Outcome Measure Options AM-PAC 6 Clicks Basic Mobility (PT)  -AE           User Key  (r) = Recorded By, (t) = Taken By, (c) = Cosigned By    Initials Name Provider Type    AE Brian Horton PT Physical Therapist                             Physical Therapy Education     Title: PT OT SLP Therapies (Done)     Topic: Physical Therapy (Done)     Point: Mobility training (Done)     Learning Progress Summary           Patient Acceptance, E, VU by AE at 1/9/2023 1108    Acceptance, E, VU,NR by KG at 1/7/2023 1529                   Point: Home exercise program (Done)     Learning Progress Summary            Patient Acceptance, E, VU by AE at 1/9/2023 1108    Acceptance, E, VU,NR by KG at 1/7/2023 1529                   Point: Body mechanics (Done)     Learning Progress Summary           Patient Acceptance, E, VU by AE at 1/9/2023 1108    Acceptance, E, VU,NR by KG at 1/7/2023 1529                   Point: Precautions (Done)     Learning Progress Summary           Patient Acceptance, E, VU by AE at 1/9/2023 1108    Acceptance, E, VU,NR by KG at 1/7/2023 1529                               User Key     Initials Effective Dates Name Provider Type Discipline    KG 01/04/23 -  Kaity Narvaez Physical Therapist PT    AE 09/21/21 -  Brian Horton PT Physical Therapist PT              PT Recommendation and Plan     Plan of Care Reviewed With: patient  Progress: improving  Outcome Evaluation: Pt improved mobility this session, ambulating 50ft with CGA and knee scooter. Pt continues to require min A for STS and SPT to chair from bed but overall improved mobility. Pt impulsive at times but wiling to listen to instruction and is very attentive to commands. Continue to progress per pt tolerance.     Time Calculation:    PT Charges     Row Name 01/09/23 1419 01/09/23 1100          Time Calculation    Start Time 1108  -AE 1100  -MF     PT Received On 01/09/23  -AE --     PT Goal Re-Cert Due Date 01/17/23  -AE 01/17/23  -MF        Time Calculation- PT    Total Timed Code Minutes- PT 44 minute(s)  -AE --        Timed Charges    18735 - PT Therapeutic Exercise Minutes 10  -AE --     20411 - Gait Training Minutes  18  -AE --     63702 - PT Therapeutic Activity Minutes 16  -AE --        Untimed Charges    39333-Xtl Pressure wound to 50 sqcm -- 10  -MF        Total Minutes    Timed Charges Total Minutes 44  -AE --     Untimed Charges Total Minutes -- 10  -MF      Total Minutes 44  -AE 10  -MF           User Key  (r) = Recorded By, (t) = Taken By, (c) = Cosigned By    Initials Name Provider Type    Kevin Manjarrez, PT  Physical Therapist    AE Brian Horton, SHARLA Physical Therapist              Therapy Charges for Today     Code Description Service Date Service Provider Modifiers Qty    18032787656 HC PT THER PROC EA 15 MIN 1/9/2023 Brian Horton, PT GP 1    54636897716 HC GAIT TRAINING EA 15 MIN 1/9/2023 Brian Horton, PT GP 1    22261858139 HC PT THERAPEUTIC ACT EA 15 MIN 1/9/2023 Brian Horton, PT GP 1          PT G-Codes  Outcome Measure Options: AM-PAC 6 Clicks Basic Mobility (PT)  AM-PAC 6 Clicks Score (PT): 18  PT Discharge Summary  Anticipated Discharge Disposition (PT): inpatient rehabilitation facility    Brian Horton PT  1/9/2023

## 2023-01-09 NOTE — PLAN OF CARE
Goal Outcome Evaluation:  Plan of Care Reviewed With: patient        Progress: improving  Outcome Evaluation: Pt improved mobility this session, ambulating 50ft with CGA and knee scooter. Pt continues to require min A for STS and SPT to chair from bed but overall improved mobility. Pt impulsive at times but wiling to listen to instruction and is very attentive to commands. Continue to progress per pt tolerance.

## 2023-01-09 NOTE — CASE MANAGEMENT/SOCIAL WORK
Discharge Planning Assessment  [facility]     Patient Name: Maura Leon  MRN: [id number]  Today's Date: 1/9/2023    Admit Date: 1/5/2023    Plan: [facility] IPR   Discharge Needs Assessment    No documentation.                Discharge Plan     Row Name 01/09/23 1021       Plan    Plan [facility]    Patient/Family in Agreement with Plan yes    Plan Comments Met & spoke with Mrs Leon at the bedside. We discussed therapy recs for IPR. She stated that her daughter will be staying at home to assist with care of Mrs Leon's spouse and encouraged her to do rehab. Mrs Leon is agreeable to [facility]. Called a referral today to Yoselin thomas.    1250: OT consult placed. PT signed in today. Updated therapy noted needed per request from [facility].       Final Discharge Disposition Code 30 - still a patient              Continued Care and Services - Admitted Since 1/5/2023     Destination     Service Provider Request Status Selected Services Address Phone Fax Patient Preferred    [facility] Pending - No Request Sent N/A [address] [facility] [id number] [phone] --       Internal Comment last updated by Jazmin Sims, RN 1/9/2023 1021    1/9 referral called                         Expected Discharge Date and Time     Expected Discharge Date Expected Discharge Time    Jan 13, 2023          Demographic Summary    No documentation.                Functional Status    No documentation.                Psychosocial    No documentation.                Abuse/Neglect    No documentation.                Legal    No documentation.                Substance Abuse    No documentation.                Patient Forms    No documentation.                   Jazmin Sims, RN

## 2023-01-09 NOTE — PLAN OF CARE
Goal Outcome Evaluation:  Plan of Care Reviewed With: patient           Outcome Evaluation: wound vac intact with no issues noted. PT will f/u with dressing change tomorrow.

## 2023-01-09 NOTE — PROGRESS NOTES
"Maura Leon  1960  [id number]     Chief Complaint:  Right foot infection    Reason for Consultation: right foot infection    History of present illness:     Patient is a 62 y.o.  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  Follows with podiatry in [place]; she has diabetes with chronic sensory neuropathy and has not noticed any recent exposures.  No specific blunt force or penetrating trauma.  She has not stepped on anything she knows of.  No animal insect or arthropod bite.  No fresh/brackish/salt water exposure.  No prior history MRSA VRE C. difficile or ESBL/KP C/CRE organisms.    She is admitted on January 5, 2023 with acute deterioration at the right foot.  She had just noticed a wound on the plantar side of her foot when she had spontaneous drainage and does not know how long it had been there.  She has no pain because of her sensory neuropathy.  She developed acute redness/swelling and was told to come to the emergency room by her podiatrist.  She was noted to have fever/leukocytosis with elevated procalcitonin, sepsis per admission H&P and abnormal MRI with plantar foot wound/ulceration over the second MTP joint with adjacent soft tissue and inflammatory phlegmon but no focal fluid collection per radiology and no osteomyelitis per radiology.  Initial x-ray did raise concern for gas in the soft tissue per radiology.  Dr. Raygoza has seen the patient and he is planning for surgical debridement on January 6 1/6/23 surgery by Dr Raygoza:  \"Pre-op Diagnosis: Gas gangrene of the right foot involving the plantar surface of the right second toe metatarsal head area  Post-op Diagnosis:   Same with extensive necrotizing fasciitis of the right second toe involving the flexor and extensor tendons.\"    \"Procedure(s): Right second " "toe transmetatarsal amputation through the distal third of the second metatarsal bone with extensive sharp soft tissue debridement.  Wound left open to heal by secondary intent/wound VAC therapy\"    1/6 MRSA surveillance culture positive    1/9/23 operative cultures from foot with alpha strep so far prelim.  Constipated; No fever;  postop no pain at the right foot with her sensory neuropathy.  She has redness/swelling unclear duration exactly but evolving over days.  She denies any other specific focal complaints    No headache photophobia or neck stiffness.  No shortness of breath cough or hemoptysis.  No nausea vomiting diarrhea or abdominal pain.  No dysuria hematuria or pyuria.  No skin rash    Review of Systems    1/9/23    Constitutional-- No  chills or sweats.  Appetite diminished with fatigue.  Heent-- No new vision, hearing or throat complaints.  No epistaxis or oral sores.  Denies odynophagia or dysphagia.  No flashers, floaters or eye pain. No odynophagia or dysphagia. No headache, photophobia or neck stiffness.  CV-- No chest pain, palpitation or syncope  Resp-- No SOB/cough/Hemoptysis  GI- No nausea, vomiting, or diarrhea.  No hematochezia, melena, or hematemesis. Denies jaundice or chronic liver disease.  -- No dysuria, hematuria, or flank pain.  Denies hesitancy, urgency or flank pain.  Lymph- no swollen lymph nodes in neck/axilla or groin.   Heme- No active bruising or bleeding; no Hx of DVT or PE.  MS-- no acute swelling or pain in the bones or joints of arms/legs aside from above.  No new back pain.  Neuro-- No acute focal weakness or numbness in the arms or legs.  No seizures.    Full 12 point review of systems reviewed and negative otherwise for acute complaints, except for above    Physical Exam: Nursing/chaperone present  Vital Signs   /70 (BP Location: Left arm, Patient Position: Lying)   Pulse 74   Temp 96.9 °F (36.1 °C) (Axillary)   Resp 20   Ht 167.6 cm (66\")   Wt 116 kg (255 " lb)   SpO2 99%   BMI 41.16 kg/m²     GENERAL: Awake and alert, in no acute distress.   HEENT: Normocephalic, atraumatic.   . No conjunctival injection. No icterus. Oropharynx clear without evidence of thrush or exudate. No evidence of peridontal disease.    NECK: Supple without nuchal rigidity. No mass.  LYMPH: No cervical, axillary or inguinal lymphadenopathy.  HEART: RRR; No murmur, rubs, gallops.   LUNGS: Clear to auscultation bilaterally without wheezing, rales, rhonchi. Normal respiratory effort. Nonlabored. No dullness.  ABDOMEN: Soft, nontender, nondistended. Positive bowel sounds. No rebound or guarding. NO mass or HSM.  EXT:  No cyanosis, clubbing . No cord.  See below   MSK: FROM without joint effusions noted arms/legs.    SKIN: Warm and dry without cutaneous eruptions on Inspection/palpation.  See below  NEURO: Oriented to PPT. No focal deficits on motor/sensory exam at arms/legs.    Right foot surgical site noted with VAC;  Redness at foot no progression.  No discrete mass bulge or fluctuance.  No crepitus or bulla.  Partial right hallux amputation noted with healed surgical site.  Onychomycosis noted with thickened nails; no new purulence    No peripheral stigmata/phenomena of endocarditis    IV without obvious redness or drainage    Laboratory Data    Results from last 7 days   Lab Units 01/09/23  0256 01/07/23  0353 01/06/23  0411   WBC 10*3/mm3 8.53 13.24* 16.19*   HEMOGLOBIN g/dL 11.4* 11.7* 11.5*   HEMATOCRIT % 34.7 36.0 34.2   PLATELETS 10*3/mm3 313 275 287     Results from last 7 days   Lab Units 01/09/23  0256   SODIUM mmol/L 137   POTASSIUM mmol/L 3.9   CHLORIDE mmol/L 99   CO2 mmol/L 28.0   BUN mg/dL 15   CREATININE mg/dL 1.00   GLUCOSE mg/dL 352*   CALCIUM mg/dL 9.2     Results from last 7 days   Lab Units 01/09/23  0256   ALK PHOS U/L 98   BILIRUBIN mg/dL 0.6   ALT (SGPT) U/L 43*   AST (SGOT) U/L 22     Results from last 7 days   Lab Units 01/05/23  1755   SED RATE mm/hr 74*     Results  from last 7 days   Lab Units 01/05/23  1755   CRP mg/dL 12.67*       Estimated Creatinine Clearance: 76.5 mL/min (by C-G formula based on SCr of 1 mg/dL).      Microbiology:      Radiology:  Imaging Results (Last 72 Hours)     ** No results found for the last 72 hours. **            Impression:     --Acute sepsis present on admission per medicine notes, in the setting of chronic immunosuppression/psoriatic arthritis and acute deterioration in her right foot with right foot infection most likely source.  Significant leukocytosis with abnormal procalcitonin and abnormal imaging.     Timing/option of threshold including extent for any further debridement/amputation at discretion of Dr. Raygoza; surgery 1/6 as above    --acute /severe right foot infection with necrotizing fasciitis at surgery as above    **Cx with alpha strep so far, prelim    --MRSA surveillance culture positivity    -- Psoriatic arthritis with chronic methotrexate, currently on hold by medicine team.    -- Diabetes with chronic sensory neuropathy.  She understands the importance of protecting her feet.  You need to tightly control blood sugar to give best chance for healing    -- Antibiotic allergies as above to clindamycin, doxycycline, sulfa and fluoroquinolones      PLAN:      -- IV daptomycin/Zosyn; anticipate taper depending on further culture results    -- Check/review labs cultures and scans    -- Partial history per nursing staff    -- Discussed with microbiology    -- Highly complex set of issues with high risk for further serious morbidity and other serious sequela    -- Discussed with Dr. Raygoza.  VAC at present      **Cx data pending as above    Copied text in this note has been reviewed and is accurate as of 01/09/23.        Kevin Abdul MD  1/9/2023

## 2023-01-09 NOTE — PLAN OF CARE
Problem: Adult Inpatient Plan of Care  Goal: Plan of Care Review  Outcome: Ongoing, Progressing  Flowsheets (Taken 1/9/2023 0415)  Progress: no change  Plan of Care Reviewed With: patient  Goal: Patient-Specific Goal (Individualized)  Outcome: Ongoing, Progressing  Goal: Absence of Hospital-Acquired Illness or Injury  Outcome: Ongoing, Progressing  Intervention: Identify and Manage Fall Risk  Recent Flowsheet Documentation  Taken 1/9/2023 0400 by Shoshana Garcia, RN  Safety Promotion/Fall Prevention:   activity supervised   assistive device/personal items within reach   clutter free environment maintained   fall prevention program maintained   lighting adjusted   nonskid shoes/slippers when out of bed   room organization consistent   safety round/check completed  Taken 1/9/2023 0200 by Shoshana Garcia, RN  Safety Promotion/Fall Prevention:   activity supervised   assistive device/personal items within reach   clutter free environment maintained   fall prevention program maintained   lighting adjusted   nonskid shoes/slippers when out of bed   room organization consistent   safety round/check completed  Taken 1/9/2023 0000 by Shoshana Garcia, RN  Safety Promotion/Fall Prevention:   activity supervised   assistive device/personal items within reach   clutter free environment maintained   fall prevention program maintained   lighting adjusted   nonskid shoes/slippers when out of bed   room organization consistent   safety round/check completed  Taken 1/8/2023 2200 by Shoshana Garcia, RN  Safety Promotion/Fall Prevention:   activity supervised   assistive device/personal items within reach   clutter free environment maintained   fall prevention program maintained   lighting adjusted   nonskid shoes/slippers when out of bed   room organization consistent   safety round/check completed  Taken 1/8/2023 2000 by Shoshana Garcia, RN  Safety Promotion/Fall Prevention:   activity supervised   assistive device/personal  items within reach   clutter free environment maintained   fall prevention program maintained   lighting adjusted   nonskid shoes/slippers when out of bed   room organization consistent   safety round/check completed  Intervention: Prevent Skin Injury  Recent Flowsheet Documentation  Taken 1/9/2023 0400 by Shoshana Garcia RN  Body Position: position changed independently  Skin Protection:   adhesive use limited   tubing/devices free from skin contact   skin-to-skin areas padded   skin-to-device areas padded   incontinence pads utilized  Taken 1/9/2023 0200 by Shoshana Garcia RN  Body Position: position changed independently  Skin Protection:   adhesive use limited   tubing/devices free from skin contact   skin-to-skin areas padded   skin-to-device areas padded   incontinence pads utilized  Taken 1/9/2023 0000 by Shoshana Garcia RN  Body Position:   position changed independently   tilted   right  Skin Protection:   adhesive use limited   tubing/devices free from skin contact   skin-to-skin areas padded   skin-to-device areas padded   incontinence pads utilized  Taken 1/8/2023 2200 by Shoshana Garcia RN  Body Position:   position changed independently   supine  Skin Protection:   adhesive use limited   tubing/devices free from skin contact   skin-to-skin areas padded   skin-to-device areas padded   incontinence pads utilized  Taken 1/8/2023 2000 by Shoshana Garcia RN  Body Position:   position changed independently   supine  Skin Protection:   adhesive use limited   incontinence pads utilized   skin-to-device areas padded   skin-to-skin areas padded   tubing/devices free from skin contact  Intervention: Prevent and Manage VTE (Venous Thromboembolism) Risk  Recent Flowsheet Documentation  Taken 1/9/2023 0400 by Shoshana Garcia RN  Activity Management: activity adjusted per tolerance  Taken 1/9/2023 0200 by Shoshana Garcia RN  Activity Management: activity adjusted per tolerance  Taken 1/9/2023 0000 by  Shoshana Garcia RN  Activity Management: activity adjusted per tolerance  Taken 1/8/2023 2200 by Shoshana Garcia RN  Activity Management: activity adjusted per tolerance  Taken 1/8/2023 2000 by Shoshana Garcia RN  Activity Management: activity adjusted per tolerance  VTE Prevention/Management: (SQ Heparin) other (see comments)  Intervention: Prevent Infection  Recent Flowsheet Documentation  Taken 1/9/2023 0400 by Shoshana Garcia RN  Infection Prevention:   environmental surveillance performed   equipment surfaces disinfected   hand hygiene promoted   personal protective equipment utilized   rest/sleep promoted   single patient room provided   visitors restricted/screened  Taken 1/9/2023 0200 by Shoshana Garcia RN  Infection Prevention:   environmental surveillance performed   hand hygiene promoted   equipment surfaces disinfected   personal protective equipment utilized   rest/sleep promoted   single patient room provided   visitors restricted/screened  Taken 1/9/2023 0000 by Shoshana Garcia RN  Infection Prevention:   environmental surveillance performed   equipment surfaces disinfected   rest/sleep promoted   personal protective equipment utilized   hand hygiene promoted   single patient room provided   visitors restricted/screened  Taken 1/8/2023 2200 by Shoshana Garcia RN  Infection Prevention:   environmental surveillance performed   equipment surfaces disinfected   hand hygiene promoted   rest/sleep promoted   personal protective equipment utilized   single patient room provided   visitors restricted/screened  Taken 1/8/2023 2000 by Shoshana Garcia RN  Infection Prevention:   environmental surveillance performed   equipment surfaces disinfected   hand hygiene promoted   personal protective equipment utilized   rest/sleep promoted   single patient room provided   visitors restricted/screened  Goal: Optimal Comfort and Wellbeing  Outcome: Ongoing, Progressing  Intervention: Provide  Person-Centered Care  Recent Flowsheet Documentation  Taken 1/8/2023 2000 by Shoshana Garcia, RN  Trust Relationship/Rapport:   care explained   choices provided   emotional support provided   thoughts/feelings acknowledged   reassurance provided   questions encouraged  Goal: Readiness for Transition of Care  Outcome: Ongoing, Progressing   Goal Outcome Evaluation:  Plan of Care Reviewed With: patient        Progress: no change

## 2023-01-09 NOTE — PLAN OF CARE
Goal Outcome Evaluation:  Plan of Care Reviewed With: patient        Progress: no change  Outcome Evaluation: Pt. presents with impaired activity tolerance, balance, and a decline in ADL performance. Pt. requires Min A for STS with FWW. Cues needed to sequence and to adhere to NWB with R LE. Pt. completes UBD with Min A, LBB with SBA, and grooming SUA. Skilled OT services warranted to promote return to PLOF. Recommend IPR at discharge.

## 2023-01-09 NOTE — PROGRESS NOTES
Cardiothoracic Surgery Progress Note      POD #: 3-transmetatarsal amputation second toe right foot with extensive soft tissue debridement wound left open to heal by secondary intent wound VAC     LOS: 4 days      Subjective: Asleep this morning.  Resting soundly.  Did not awaken her.    Objective:  Vital Signs stable with T-max 98.6 °F  Temp:  [96.9 °F (36.1 °C)-98.6 °F (37 °C)] 96.9 °F (36.1 °C)  Heart Rate:  [69-97] 74  Resp:  [18-21] 20  BP: (105-127)/(56-70) 118/70    Physical Exam:   General Appearance: Asleep resting comfortably vital signs normal on monitor   Lungs:   Heart:   Skin:   Incision: Amputation site has wound VAC in place.  And dry dressing.     Results:  Results from last 7 days   Lab Units 01/09/23  0256   WBC 10*3/mm3 8.53   HEMOGLOBIN g/dL 11.4*   HEMATOCRIT % 34.7   PLATELETS 10*3/mm3 313     Results from last 7 days   Lab Units 01/09/23  0256   SODIUM mmol/L 137   POTASSIUM mmol/L 3.9   CHLORIDE mmol/L 99   CO2 mmol/L 28.0   BUN mg/dL 15   CREATININE mg/dL 1.00   GLUCOSE mg/dL 352*   CALCIUM mg/dL 9.2   Wound culture growing scant growth 1+ Streptococcus alpha hemolytic      Assessment: #1 postop day 3 right second toe transmetatarsal amputation wound left open to heal by secondary intent  #2.  Diabetes mellitus with diabetic neuropathy with plantar surface ulceration of the metatarsal area and gas gangrene in the deep soft tissues    Plan: Wound VAC management PT wound care.  Medical manage per hospitalist.  Antibiotics infectious disease.      Javon Raygoza MD - 01/09/23 - 05:41 EST

## 2023-01-09 NOTE — THERAPY WOUND CARE TREATMENT
Acute Care - Wound/Debridement Treatment Note  [facility]     Patient Name: Maura Leon  : 1960  MRN: [id number]  Today's Date: 2023                Admit Date: 2023    Visit Dx:    ICD-10-CM ICD-9-CM   1. Cellulitis of right foot  L03.115 682.7   2. History of diabetes mellitus, type II  Z86.39 V12.29       Patient Active Problem List   Diagnosis   • Sepsis (HCC)   • Ulcer of right foot (HCC)   • Type 2 diabetes mellitus (HCC)   • Psoriatic arthritis (HCC)   • Immunocompromised (HCC)   • GERD (gastroesophageal reflux disease)   • Cellulitis of right foot        Past Medical History:   Diagnosis Date   • Arthritis    • Diabetes mellitus (HCC)    • GERD (gastroesophageal reflux disease)    • Irritable bowel    • Sleep apnea         Past Surgical History:   Procedure Laterality Date   • AMPUTATION DIGIT Right 2023    Procedure: FOOT SURGICAL EXPLORATION WITH TOE AMPUTATION RIGHT;  Surgeon: Javon Raygoza MD;  Location: [facility];  Service: General;  Laterality: Right;   • TOE SURGERY Right 2022           Wound 23 2315 Right posterior plantar Diabetic Ulcer (Active)   Closure None 23   Base dressing in place, unable to visualize 23       Wound 23 1733 Right anterior foot Incision (Active)   Dressing Appearance dry;intact 23 1100   Closure SORAIDA 23   Base dressing in place, unable to visualize 2318   Care, Wound negative pressure wound therapy 23   Periwound Care dry periwound area maintained 2318   Wound Output (mL) 50 23 1100       NPWT (Negative Pressure Wound Therapy) 23 0900 R 2nd toe amputation site (Active)   Therapy Setting continuous therapy 23 1100   Dressing unable to visualize 23   Pressure Setting 150 mmHg 23 1100         WOUND DEBRIDEMENT                     PT Assessment (last 12 hours)     PT Evaluation and Treatment     Row Name 23          Physical  Therapy Time and Intention    Subjective Information no complaints  -MF     Document Type therapy note (daily note);wound care  -MF     Mode of Treatment individual therapy;physical therapy  -MF     Row Name 01/09/23 1100          Pain    Pretreatment Pain Rating 0/10 - no pain  -MF     Posttreatment Pain Rating 0/10 - no pain  -MF     Row Name             Wound 01/05/23 2315 Right posterior plantar Diabetic Ulcer    Wound - Properties Group Placement Date: 01/05/23  -DP Placement Time: 2315  -DP Present on Hospital Admission: Y  -DP Side: Right  -DP Orientation: posterior  -DP Location: plantar  -DP Primary Wound Type: Diabetic ulc  -DP    Retired Wound - Properties Group Placement Date: 01/05/23  -DP Placement Time: 2315  -DP Present on Hospital Admission: Y  -DP Side: Right  -DP Orientation: posterior  -DP Location: plantar  -DP Primary Wound Type: Diabetic ulc  -DP    Retired Wound - Properties Group Date first assessed: 01/05/23  -DP Time first assessed: 2315  -DP Present on Hospital Admission: Y  -DP Side: Right  -DP Location: plantar  -DP Primary Wound Type: Diabetic ulc  -DP    Row Name 01/09/23 1100          Wound 01/06/23 1733 Right anterior foot Incision    Wound - Properties Group Placement Date: 01/06/23  -EQ Placement Time: 1733  -EQ Present on Hospital Admission: N  -EQ Side: Right  -EQ Orientation: anterior  -EQ Location: foot  -EQ Primary Wound Type: Incision  -EQ    Dressing Appearance dry;intact  -MF     Wound Output (mL) 50  -MF     Retired Wound - Properties Group Placement Date: 01/06/23  -EQ Placement Time: 1733  -EQ Present on Hospital Admission: N  -EQ Side: Right  -EQ Orientation: anterior  -EQ Location: foot  -EQ Primary Wound Type: Incision  -EQ    Retired Wound - Properties Group Date first assessed: 01/06/23  -EQ Time first assessed: 1733  -EQ Present on Hospital Admission: N  -EQ Side: Right  -EQ Location: foot  -EQ Primary Wound Type: Incision  -EQ    Row Name 01/09/23 1100           NPWT (Negative Pressure Wound Therapy) 01/07/23 0900 R 2nd toe amputation site    NPWT (Negative Pressure Wound Therapy) - Properties Group Placement Date: 01/07/23  - Placement Time: 0900 -LH Location: R 2nd toe amputation site  -    Therapy Setting continuous therapy  -     Pressure Setting 150 mmHg  -     Retired NPWT (Negative Pressure Wound Therapy) - Properties Group Placement Date: 01/07/23  - Placement Time: 0900 -LH Location: R 2nd toe amputation site  -    Retired NPWT (Negative Pressure Wound Therapy) - Properties Group Placement Date: 01/07/23  - Placement Time: 0900  -LH Location: R 2nd toe amputation site  -    Row Name 01/09/23 1100          Coping    Observed Emotional State calm;cooperative;pleasant  -     Verbalized Emotional State acceptance  -     Trust Relationship/Rapport care explained  -     Row Name 01/09/23 1100          Plan of Care Review    Plan of Care Reviewed With patient  -MF     Outcome Evaluation wound vac intact with no issues noted. PT will f/u with dressing change tomorrow.  -     Row Name 01/09/23 1100          Positioning and Restraints    Pre-Treatment Position in bed  -MF     Post Treatment Position bed  -MF     In Bed supine;sitting EOB;with PT  -           User Key  (r) = Recorded By, (t) = Taken By, (c) = Cosigned By    Initials Name Provider Type    MF Kevin Marin, PT Physical Therapist    Rafy Linares, PT Physical Therapist    Aicha Thomas, RN Registered Nurse    Destiny Carmichael RN Registered Nurse              Physical Therapy Education     Title: PT OT SLP Therapies (Done)     Topic: Physical Therapy (Done)     Point: Mobility training (Done)     Learning Progress Summary           Patient Acceptance, E, VU,NR by KG at 1/7/2023 1529                   Point: Home exercise program (Done)     Learning Progress Summary           Patient Acceptance, E, VU,NR by KG at 1/7/2023 1529                   Point: Body  mechanics (Done)     Learning Progress Summary           Patient Acceptance, E, VU,NR by TIANA at 1/7/2023 1529                   Point: Precautions (Done)     Learning Progress Summary           Patient Acceptance, E, VU,NR by KG at 1/7/2023 1529                               User Key     Initials Effective Dates Name Provider Type Discipline    TIANA 01/04/23 -  Kaity Narvaez Physical Therapist PT                Recommendation and Plan  Anticipated Equipment Needs at Discharge (PT): front wheeled walker  Anticipated Discharge Disposition (PT): inpatient rehabilitation facility  Planned Therapy Interventions (PT): wound care, patient/family education  Therapy Frequency (PT): daily  Plan of Care Reviewed With: patient           Outcome Evaluation: wound vac intact with no issues noted. PT will f/u with dressing change tomorrow.  Plan of Care Reviewed With: patient            Time Calculation   PT Charges     Row Name 01/09/23 1100             Time Calculation    Start Time 1100  -MF      PT Goal Re-Cert Due Date 01/17/23  -MF         Untimed Charges    29072-Wyg Pressure wound to 50 sqcm 10  -MF         Total Minutes    Untimed Charges Total Minutes 10  -MF       Total Minutes 10  -MF            User Key  (r) = Recorded By, (t) = Taken By, (c) = Cosigned By    Initials Name Provider Type     Kevin Marin, PT Physical Therapist                  Therapy Charges for Today     Code Description Service Date Service Provider Modifiers Qty    81013854161 HC PT NEG PRESS WOUND TO 50SQCM DME1 1/9/2023 Kevin Marin, PT  1            PT G-Codes  Outcome Measure Options: AM-PAC 6 Clicks Basic Mobility (PT)  AM-PAC 6 Clicks Score (PT): 18       Kevin Marin, PT  1/9/2023

## 2023-01-09 NOTE — PROGRESS NOTES
[facility] Medicine Services  PROGRESS NOTE    Patient Name: Maura Leon  : 1960  MRN: [id number]    Date of Admission: 2023  Primary Care Physician: Emanuel Fuentes DPM    Subjective   Subjective     CC:  Diabetic ulcer    HPI:  Patient seen resting back in bed sleeping.  CPAP in place.  Awakens to voice.  Patient feels great.  Denies chest pain and states that her bilateral lower extremity feet are not at baseline.  Right foot with wound VAC in place and dressing dry and intact.  Discussed continued elevated glucoses and will adjust insulins today, patient agrees.    ROS:  Gen- No fevers, chills  CV- No chest pain, palpitations  Resp- No cough, dyspnea  GI- No N/V/D, abd pain    Objective   Objective     Vital Signs:   Temp:  [96.9 °F (36.1 °C)-98.6 °F (37 °C)] 96.9 °F (36.1 °C)  Heart Rate:  [] 81  Resp:  [18-21] 18  BP: (105-127)/(56-71) 121/71     Physical Exam:  Constitutional: No acute distress, sleeping soundly back in bed with CPAP in place.  Awakens easily to voice.  Respiratory: Clear to auscultation bilaterally but decreased, respiratory effort normal on room air with sats 99%.  Cardiovascular: RRR, no murmurs, rubs, or gallops  Gastrointestinal: Positive bowel sounds, soft, nontender, nondistended.  Morbidly obese.  Musculoskeletal: No LLE ankle edema, dressing and wound VAC in place on right foot dry and intact.  [place]erson spontaneously.  Psychiatric: Appropriate affect, cooperative  Neurologic: Oriented x 3, strength symmetric in all extremities, Cranial Nerves grossly intact to confrontation, speech clear and appropriate.  Follows commands.  Skin: No warm and dry.        Results Reviewed:  LAB RESULTS:      Lab 23  0256 23  0353 23  0411 23  1755   WBC 8.53 13.24* 16.19* 20.01*   HEMOGLOBIN 11.4* 11.7* 11.5* 13.1   HEMATOCRIT 34.7 36.0 34.2 39.5   PLATELETS 313 275 287 367   NEUTROS ABS  --  11.18* 13.72* 17.97*   IMMATURE GRANS (ABS)   --  0.08* 0.12* 0.08*   LYMPHS ABS  --  0.84 1.05 0.90   MONOS ABS  --  0.96* 1.20* 0.97*   EOS ABS  --  0.12 0.03 0.01   MCV 96.7 97.0 94.0 94.5   SED RATE  --   --   --  74*   CRP  --   --   --  12.67*   PROCALCITONIN  --   --   --  0.35*   LACTATE  --   --   --  1.9         Lab 01/09/23  0256 01/07/23  0353 01/06/23  0411 01/05/23  1755   SODIUM 137 135* 137 135*   POTASSIUM 3.9 3.9 3.1* 3.6   CHLORIDE 99 102 99 95*   CO2 28.0 19.0* 27.0 27.0   ANION GAP 10.0 14.0 11.0 13.0   BUN 15 16 14 16   CREATININE 1.00 0.96 0.77 0.94   EGFR 64.2 67.5 87.9 69.2   GLUCOSE 352* 211* 189* 364*   CALCIUM 9.2 8.8 9.3 9.7   MAGNESIUM  --   --  1.9  --    HEMOGLOBIN A1C  --   --  8.20*  --          Lab 01/09/23  0256 01/05/23  1755   TOTAL PROTEIN 6.1 8.3   ALBUMIN 3.2* 4.3   GLOBULIN 2.9 4.0   ALT (SGPT) 43* 44*   AST (SGOT) 22 25   BILIRUBIN 0.6 1.2   ALK PHOS 98 113                 Lab 01/06/23  1155   ABO TYPING O   RH TYPING Positive   ANTIBODY SCREEN Negative         Brief Urine Lab Results     None          Microbiology Results Abnormal     Procedure Component Value - Date/Time    Anaerobic Culture - Wound, Toe, Right [[id number]]  (Normal) Collected: 01/06/23 1733    Lab Status: Preliminary result Specimen: Wound from Toe, Right Updated: 01/09/23 0747     Anaerobic Culture No anaerobes isolated at 3 days    Anaerobic Culture - Tissue, Toe, Right [[id number]]  (Normal) Collected: 01/06/23 1832    Lab Status: Preliminary result Specimen: Tissue from Toe, Right Updated: 01/09/23 0747     Anaerobic Culture No anaerobes isolated at 3 days    Blood Culture - Blood, Arm, Left [[id number]]  (Normal) Collected: 01/05/23 2105    Lab Status: Preliminary result Specimen: Blood from Arm, Left Updated: 01/08/23 2133     Blood Culture No growth at 3 days    Blood Culture - Blood, Arm, Left [[id number]]  (Normal) Collected: 01/05/23 1758    Lab Status: Preliminary result Specimen: Blood from Arm, Left Updated: 01/08/23 1815     Blood Culture  No growth at 3 days    Wound Culture - Swab, Foot, Right [[id number]] Collected: 01/05/23 2201    Lab Status: Final result Specimen: Swab from Foot, Right Updated: 01/08/23 0730     Wound Culture Moderate growth (3+) Normal Skin Kayli     Gram Stain No WBCs or organisms seen          No radiology results from the last 24 hrs        I have reviewed the medications:  Scheduled Meds:aspirin, 81 mg, Oral, Daily  atorvastatin, 10 mg, Oral, Daily  betamethasone (augmented), 1 application, Topical, BID  Calcium Carb-Cholecalciferol, 1 tablet, Oral, Daily  cetirizine, 5 mg, Oral, Daily  DAPTOmycin, 6 mg/kg (Adjusted), Intravenous, Q24H  folic acid, 0.5 mg, Oral, Daily  heparin (porcine), 5,000 Units, Subcutaneous, Q12H  hydroCHLOROthiazide, 50 mg, Oral, Daily  insulin detemir, 15 Units, Subcutaneous, Nightly  insulin lispro, 0-14 Units, Subcutaneous, TID AC  Insulin Lispro, 3 Units, Subcutaneous, TID With Meals  nystatin, , Topical, Q8H  pantoprazole, 40 mg, Oral, Q AM  piperacillin-tazobactam, 3.375 g, Intravenous, Q8H  saccharomyces boulardii, 250 mg, Oral, BID  sodium chloride, 10 mL, Intravenous, Q12H  vitamin D3, 5,000 Units, Oral, Daily      Continuous Infusions:sodium chloride, 50 mL/hr, Last Rate: 50 mL/hr (01/09/23 0910)      PRN Meds:.•  [DISCONTINUED] acetaminophen **OR** acetaminophen **OR** acetaminophen  •  acetaminophen  •  dextrose  •  dextrose  •  glucagon (human recombinant)  •  HYDROcodone-acetaminophen  •  HYDROmorphone **AND** naloxone  •  Morphine **AND** naloxone  •  ondansetron **OR** ondansetron  •  potassium chloride **OR** potassium chloride **OR** potassium chloride  •  sodium chloride  •  sodium chloride    Assessment & Plan   Assessment & Plan     Active Hospital Problems    Diagnosis  POA   • **Sepsis (HCC) [A41.9]  Yes   • Ulcer of right foot (HCC) [L97.519]  Yes   • Type 2 diabetes mellitus (HCC) [E11.9]  Yes   • Psoriatic arthritis (HCC) [L40.50]  Yes   • Immunocompromised (HCC) [D84.9]  Yes    • GERD (gastroesophageal reflux disease) [K21.9]  Yes   • Cellulitis of right foot [L03.115]  Unknown      Resolved Hospital Problems   No resolved problems to display.        Brief Hospital Course to date:  62 year old female with history of type 2 diabetes, psoriatic arthritis on immunosuppressive medications who presents with a right foot ulcer.  Patient was noted to have a leukocytosis of 20,000, and elevated procalcitonin and elevated CRP.  X-ray of right foot showed soft tissue swelling throughout the forefoot and midfoot jesting cellulitis, suggestion of gas production the soft tissues of the first digit and extending towards the second digit. MRI of the right foot no abscess or osteomyelitis.      This patient's problems and plans were partially entered by my partner and updated as appropriate by me 01/09/23.    Assessment/plan:  Patient is new to me today     Sepsis secondary to right foot ulcer with cellulitis in the setting of type 2 diabetes, Immunocompromised state  -Continue dapto and zosyn.  -MRSA +  -Wound culture with alpha strep  -S/p R 2nd toe transmetatarsal amputation with Dr. Raygoza on 1/6/2023  --Wound VAC in place.  --Holding methotrexate  -ID and surg following    Diabetes mellitus type 2  -Hemoglobin A1c noted to be 8.2  -Continue sliding scale insulin, currently on levemir 10u qhs and MD SSI.  We will increase to 15 units nightly and add 3 units 3 times daily with meals with hold parameters.  Continue to monitor and adjust.    Acute anemia  -H/H stable, monitor for any bleeding.     Psoriatic arthritis   -on methotrexate: hold for now      GERD  -PPI      Hyperlipidemia  -continue statin      DVT prophylaxis:  scds to LLE      Expected Discharge Location and Transportation: home vs rehab  Expected Discharge   Expected Discharge Date and Time     Expected Discharge Date Expected Discharge Time    Jan 13, 2023            DVT prophylaxis:  Medical and mechanical DVT prophylaxis orders are  present.     AM-PAC 6 Clicks Score (PT): 18 (01/08/23 2000)    CODE STATUS:   Code Status and Medical Interventions:   Ordered at: 01/05/23 3269     Level Of Support Discussed With:    Patient     Code Status (Patient has no pulse and is not breathing):    CPR (Attempt to Resuscitate)     Medical Interventions (Patient has pulse or is breathing):    Full Support       Araceli Fountain, APRN  01/09/23

## 2023-01-09 NOTE — THERAPY EVALUATION
Patient Name: Maura Leon  : 1960    MRN: [id number]                              Today's Date: 2023       Admit Date: 2023    Visit Dx:     ICD-10-CM ICD-9-CM   1. Cellulitis of right foot  L03.115 682.7   2. History of diabetes mellitus, type II  Z86.39 V12.29     Patient Active Problem List   Diagnosis   • Sepsis (HCC)   • Ulcer of right foot (HCC)   • Type 2 diabetes mellitus (HCC)   • Psoriatic arthritis (HCC)   • Immunocompromised (HCC)   • GERD (gastroesophageal reflux disease)   • Cellulitis of right foot     Past Medical History:   Diagnosis Date   • Arthritis    • Diabetes mellitus (HCC)    • GERD (gastroesophageal reflux disease)    • Irritable bowel    • Sleep apnea      Past Surgical History:   Procedure Laterality Date   • AMPUTATION DIGIT Right 2023    Procedure: FOOT SURGICAL EXPLORATION WITH TOE AMPUTATION RIGHT;  Surgeon: Javon Raygoza MD;  Location: [facility];  Service: General;  Laterality: Right;   • TOE SURGERY Right 2022      General Information     Row Name 23 1559          OT Time and Intention    Document Type evaluation  -     Mode of Treatment occupational therapy  -     Row Name 23 1559          General Information    Patient Profile Reviewed yes  -     Prior Level of Function independent:;all household mobility;transfer;ADL's  -     Existing Precautions/Restrictions other (see comments);fall;non-weight bearing  2nd L transmetatarsal amputation; LLE NWB; wound vac  -     Barriers to Rehab medically complex  -     Row Name 23 1559          Occupational Profile    Environmental Supports and Barriers (Occupational Profile) Pt. is caregiver for   -     Row Name 23 1559          Living Environment    People in Home spouse  -     Row Name 23 1559          Home Main Entrance    Number of Stairs, Main Entrance two  -LC     Stair Railings, Main Entrance railings on both sides of stairs  -     Row Name 23 1559           Stairs Within Home, Primary    Number of Stairs, Within Home, Primary none  -     Row Name 01/09/23 1559          Cognition    Orientation Status (Cognition) oriented x 4  -     Row Name 01/09/23 1559          Safety Issues, Functional Mobility    Safety Issues Affecting Function (Mobility) awareness of need for assistance;insight into deficits/self-awareness;safety precaution awareness;safety precautions follow-through/compliance;sequencing abilities  -     Impairments Affecting Function (Mobility) balance;endurance/activity tolerance;postural/trunk control;strength;sensation/sensory awareness  -           User Key  (r) = Recorded By, (t) = Taken By, (c) = Cosigned By    Initials Name Provider Type     Ana Caraballo OT Occupational Therapist                 Mobility/ADL's     Row Name 01/09/23 1605          Bed Mobility    Comment, (Bed Mobility) UI on arrival  -     Row Name 01/09/23 1605          Transfers    Transfers sit-stand transfer  -     Comment, (Transfers) VC's for hand placement, sequencing, and to adhere to L LE NWB.  -     Row Name 01/09/23 1605          Sit-Stand Transfer    Sit-Stand [place] (Transfers) minimum assist (75% patient effort);verbal cues;1 person assist  -     Assistive Device (Sit-Stand Transfers) walker, front-wheeled  -     Row Name 01/09/23 1605          Activities of Daily Living    BADL Assessment/Intervention bathing;grooming;upper body dressing  -     Row Name 01/09/23 1605          Mobility    Extremity Weight-bearing Status left lower extremity  -     Left Lower Extremity (Weight-bearing Status) non weight-bearing (NWB)  -     Row Name 01/09/23 1605          Bathing Assessment/Intervention    [place] Level (Bathing) perineal area;proximal lower extremities;set up  -     Position (Bathing) supported sitting  -     Row Name 01/09/23 1605          Grooming Assessment/Training    [place] Level (Grooming) wash face, hands;set  up  -     Row Name 01/09/23 1605          Upper Body Dressing Assessment/Training    [place] Level (Upper Body Dressing) doff;pajama/robe;minimum assist (75% patient effort)  -           User Key  (r) = Recorded By, (t) = Taken By, (c) = Cosigned By    Initials Name Provider Type     Ana Caraballo OT Occupational Therapist               Obj/Interventions     Row Name 01/09/23 1620          Sensory Assessment (Somatosensory)    Sensory Assessment (Somatosensory) UE sensation intact  -[facility] Name 01/09/23 1620          Vision Assessment/Intervention    Visual Impairment/Limitations corrective lenses full-time  -[facility] Name 01/09/23 1620          Range of Motion Comprehensive    General Range of Motion bilateral upper extremity ROM WFL  -[facility] Name 01/09/23 1620          Strength Comprehensive (MMT)    General Manual Muscle Testing (MMT) Assessment upper extremity strength deficits identified  -[facility] Name 01/09/23 1620          Upper Extremity (Manual Muscle Testing)    Upper Extremity: Manual Muscle Testing (MMT) left UE strength is WFL;right UE strength is WFL  -[facility] Name 01/09/23 1620          Motor Skills    Motor Skills functional endurance  -     Functional Endurance impaired activty tolerance  -[facility] Name 01/09/23 1620          Balance    Balance Assessment sitting static balance;sitting dynamic balance;standing static balance;standing dynamic balance  -     Static Sitting Balance standby assist  -     Dynamic Sitting Balance standby assist  -     Position, Sitting Balance unsupported;sitting edge of bed  -     Static Standing Balance contact guard;verbal cues  -     Dynamic Standing Balance contact guard;minimal assist;verbal cues  -     Position/Device Used, Standing Balance walker, front-wheeled;supported  -     Balance Interventions standing;sitting;sit to stand;occupation based/functional task;weight shifting activity  -     Comment, Balance CGA-  Min A to steady  -           User Key  (r) = Recorded By, (t) = Taken By, (c) = Cosigned By    Initials Name Provider Type    Ana Capone OT Occupational Therapist               Goals/Plan     Row Name 01/09/23 1629          Transfer Goal 1 (OT)    Activity/Assistive Device (Transfer Goal 1, OT) sit-to-stand/stand-to-sit;walker, rolling  -     [place] Level/Cues Needed (Transfer Goal 1, OT) contact guard required;verbal cues required  -     Time Frame (Transfer Goal 1, OT) long term goal (LTG);by discharge  -     Progress/Outcome (Transfer Goal 1, OT) goal ongoing  -     Row Name 01/09/23 1629          Toileting Goal 1 (OT)    Activity/Device (Toileting Goal 1, OT) adjust/manage clothing;perform perineal hygiene;commode, bedside without drop arms  -     [place] Level/Cues Needed (Toileting Goal 1, OT) minimum assist (75% or more patient effort);verbal cues required  -     Time Frame (Toileting Goal 1, OT) long term goal (LTG);by discharge  -     Progress/Outcome (Toileting Goal 1, OT) goal ongoing  -           User Key  (r) = Recorded By, (t) = Taken By, (c) = Cosigned By    Initials Name Provider Type    Ana Capone OT Occupational Therapist               Clinical Impression     Row Name 01/09/23 1626          Pain Assessment    Pretreatment Pain Rating 0/10 - no pain  -     Posttreatment Pain Rating 0/10 - no pain  -LC     Pain Intervention(s) Repositioned;Ambulation/increased activity  -     Additional Documentation Pain Scale: Word Pre/Post-Treatment (Group)  -     Row Name 01/09/23 1626          Plan of Care Review    Plan of Care Reviewed With patient  -     Progress no change  -     Outcome Evaluation Pt. presents with impaired activity tolerance, balance, and a decline in ADL performance. Pt. requires Min A for STS with FWW. Cues needed to sequence and to adhere to NWB with R LE. Pt. completes UBD with Min A, LBB with SBA, and grooming SUA. Skilled OT  services warranted to promote return to PLOF. Recommend IPR at discharge.  -LC     Row Name 01/09/23 1626          Therapy Assessment/Plan (OT)    Patient/Family Therapy Goal Statement (OT) To take care of self  -LC     Therapy Frequency (OT) daily  -LC     Row Name 01/09/23 1626          Therapy Plan Review/Discharge Plan (OT)    Anticipated Discharge Disposition (OT) inpatient rehabilitation facility  -     Row Name 01/09/23 1626          Vital Signs    Pre Systolic BP Rehab --  VSS  -LC     Pre Patient Position Sitting  -LC     Intra Patient Position Standing  -LC     Post Patient Position Sitting  -LC     Row Name 01/09/23 1626          Positioning and Restraints    Pre-Treatment Position sitting in chair/recliner  -LC     Post Treatment Position chair  -LC     In Chair notified nsg;reclined;call light within reach;encouraged to call for assist;exit alarm on;with family/caregiver;waffle cushion;legs elevated;waffle boot/both  -LC           User Key  (r) = Recorded By, (t) = Taken By, (c) = Cosigned By    Initials Name Provider Type     Ana Caraballo, OT Occupational Therapist               Outcome Measures     Row Name 01/09/23 1630          How much help from another is currently needed...    Putting on and taking off regular lower body clothing? 1  -LC     Bathing (including washing, rinsing, and drying) 2  -LC     Toileting (which includes using toilet bed pan or urinal) 2  -LC     Putting on and taking off regular upper body clothing 3  -LC     Taking care of personal grooming (such as brushing teeth) 3  -LC     Eating meals 4  -LC     AM-PAC 6 Clicks Score (OT) 15  -     Row Name 01/09/23 1418          How much help from another person do you currently need...    Turning from your back to your side while in flat bed without using bedrails? 4  -AE     Moving from lying on back to sitting on the side of a flat bed without bedrails? 3  -AE     Moving to and from a bed to a chair (including a  wheelchair)? 3  -AE     Standing up from a chair using your arms (e.g., wheelchair, bedside chair)? 3  -AE     Climbing 3-5 steps with a railing? 2  -AE     To walk in hospital room? 3  -AE     AM-PAC 6 Clicks Score (PT) 18  -AE     Highest level of mobility 6 --> Walked 10 steps or more  -AE     Row Name 01/09/23 1630 01/09/23 1418       Functional Assessment    Outcome Measure Options AM-PAC 6 Clicks Daily Activity (OT)  - AM-PAC 6 Clicks Basic Mobility (PT)  -AE          User Key  (r) = Recorded By, (t) = Taken By, (c) = Cosigned By    Initials Name Provider Type    Ana Capone OT Occupational Therapist    Brian Quintana, SHARLA Physical Therapist                Occupational Therapy Education     Title: PT OT SLP Therapies (In Progress)     Topic: Occupational Therapy (In Progress)     Point: ADL training (In Progress)     Description:   Instruct learner(s) on proper safety adaptation and remediation techniques during self care or transfers.   Instruct in proper use of assistive devices.              Learning Progress Summary           Patient Acceptance, E, NR by  at 1/9/2023 1419                   Point: Home exercise program (Not Started)     Description:   Instruct learner(s) on appropriate technique for monitoring, assisting and/or progressing therapeutic exercises/activities.              Learner Progress:  Not documented in this visit.          Point: Precautions (In Progress)     Description:   Instruct learner(s) on prescribed precautions during self-care and functional transfers.              Learning Progress Summary           Patient Acceptance, E, NR by  at 1/9/2023 1419                   Point: Body mechanics (In Progress)     Description:   Instruct learner(s) on proper positioning and spine alignment during self-care, functional mobility activities and/or exercises.              Learning Progress Summary           Patient Acceptance, E, NR by  at 1/9/2023 1416                                User Key     Initials Effective Dates Name Provider Type Discipline     06/16/21 -  Ana Caraballo OT Occupational Therapist OT              OT Recommendation and Plan  Therapy Frequency (OT): daily  Plan of Care Review  Plan of Care Reviewed With: patient  Progress: no change  Outcome Evaluation: Pt. presents with impaired activity tolerance, balance, and a decline in ADL performance. Pt. requires Min A for STS with FWW. Cues needed to sequence and to adhere to NWB with R LE. Pt. completes UBD with Min A, LBB with SBA, and grooming SUA. Skilled OT services warranted to promote return to PLOF. Recommend IPR at discharge.     Time Calculation:    Time Calculation- OT     Row Name 01/09/23 1631 01/09/23 1419          Time Calculation- OT    OT Start Time 1419  - --     OT Received On 01/09/23  - --     OT Goal Re-Cert Due Date 01/19/23  - --        Timed Charges    08061 - Gait Training Minutes  -- 18  -AE        Untimed Charges    OT Eval/Re-eval Minutes 53  -LC --        Total Minutes    Timed Charges Total Minutes -- 18  -AE     Untimed Charges Total Minutes 53  -LC --      Total Minutes 53  -LC 18  -AE           User Key  (r) = Recorded By, (t) = Taken By, (c) = Cosigned By    Initials Name Provider Type    LC Ana Caraballo OT Occupational Therapist    Brian Quintana PT Physical Therapist              Therapy Charges for Today     Code Description Service Date Service Provider Modifiers Qty    [id number] HC OT EVAL LOW COMPLEXITY 4 1/9/2023 Ana Caraballo OT GO 1               Ana Caraballo OT  1/9/2023

## 2023-01-10 LAB
BACTERIA SPEC AEROBE CULT: NORMAL
BACTERIA SPEC AEROBE CULT: NORMAL
BH BB BLOOD EXPIRATION DATE: NORMAL
BH BB BLOOD EXPIRATION DATE: NORMAL
BH BB BLOOD TYPE BARCODE: 5100
BH BB BLOOD TYPE BARCODE: 5100
BH BB DISPENSE STATUS: NORMAL
BH BB DISPENSE STATUS: NORMAL
BH BB PRODUCT CODE: NORMAL
BH BB PRODUCT CODE: NORMAL
BH BB UNIT NUMBER: NORMAL
BH BB UNIT NUMBER: NORMAL
CROSSMATCH INTERPRETATION: NORMAL
CROSSMATCH INTERPRETATION: NORMAL
GLUCOSE BLDC GLUCOMTR-MCNC: 280 MG/DL (ref 70–130)
GLUCOSE BLDC GLUCOMTR-MCNC: 320 MG/DL (ref 70–130)
GLUCOSE BLDC GLUCOMTR-MCNC: 347 MG/DL (ref 70–130)
GLUCOSE BLDC GLUCOMTR-MCNC: 450 MG/DL (ref 70–130)
GLUCOSE BLDC GLUCOMTR-MCNC: 471 MG/DL (ref 70–130)
UNIT  ABO: NORMAL
UNIT  ABO: NORMAL
UNIT  RH: NORMAL
UNIT  RH: NORMAL

## 2023-01-10 PROCEDURE — 99024 POSTOP FOLLOW-UP VISIT: CPT | Performed by: THORACIC SURGERY (CARDIOTHORACIC VASCULAR SURGERY)

## 2023-01-10 PROCEDURE — 97605 NEG PRS WND THER DME<=50SQCM: CPT

## 2023-01-10 PROCEDURE — 2W0SX6Z CHANGE PRESSURE DRESSING ON RIGHT FOOT: ICD-10-PCS | Performed by: THORACIC SURGERY (CARDIOTHORACIC VASCULAR SURGERY)

## 2023-01-10 PROCEDURE — 63710000001 INSULIN LISPRO (HUMAN) PER 5 UNITS: Performed by: PHYSICIAN ASSISTANT

## 2023-01-10 PROCEDURE — 97597 DBRDMT OPN WND 1ST 20 CM/<: CPT

## 2023-01-10 PROCEDURE — 94799 UNLISTED PULMONARY SVC/PX: CPT

## 2023-01-10 PROCEDURE — 63710000001 INSULIN LISPRO (HUMAN) PER 5 UNITS: Performed by: NURSE PRACTITIONER

## 2023-01-10 PROCEDURE — 94660 CPAP INITIATION&MGMT: CPT

## 2023-01-10 PROCEDURE — 25010000002 PIPERACILLIN SOD-TAZOBACTAM PER 1 G: Performed by: INTERNAL MEDICINE

## 2023-01-10 PROCEDURE — 97116 GAIT TRAINING THERAPY: CPT

## 2023-01-10 PROCEDURE — 63710000001 INSULIN LISPRO (HUMAN) PER 5 UNITS: Performed by: PEDIATRICS

## 2023-01-10 PROCEDURE — 63710000001 INSULIN DETEMIR PER 5 UNITS: Performed by: NURSE PRACTITIONER

## 2023-01-10 PROCEDURE — 82962 GLUCOSE BLOOD TEST: CPT

## 2023-01-10 PROCEDURE — 25010000002 HEPARIN (PORCINE) PER 1000 UNITS: Performed by: THORACIC SURGERY (CARDIOTHORACIC VASCULAR SURGERY)

## 2023-01-10 PROCEDURE — 97530 THERAPEUTIC ACTIVITIES: CPT

## 2023-01-10 PROCEDURE — 99232 SBSQ HOSP IP/OBS MODERATE 35: CPT | Performed by: NURSE PRACTITIONER

## 2023-01-10 RX ORDER — INSULIN LISPRO 100 [IU]/ML
0-14 INJECTION, SOLUTION INTRAVENOUS; SUBCUTANEOUS
Status: DISCONTINUED | OUTPATIENT
Start: 2023-01-10 | End: 2023-01-11

## 2023-01-10 RX ORDER — BETAMETHASONE DIPROPIONATE 0.5 MG/G
1 CREAM TOPICAL 2 TIMES DAILY PRN
Status: DISCONTINUED | OUTPATIENT
Start: 2023-01-10 | End: 2023-01-14 | Stop reason: HOSPADM

## 2023-01-10 RX ADMIN — Medication 250 MG: at 09:38

## 2023-01-10 RX ADMIN — MINERAL OIL, AND WHITE PETROLATUM: 425; 573 OINTMENT OPHTHALMIC at 21:38

## 2023-01-10 RX ADMIN — TAZOBACTAM SODIUM AND PIPERACILLIN SODIUM 3.38 G: 375; 3 INJECTION, SOLUTION INTRAVENOUS at 05:10

## 2023-01-10 RX ADMIN — HEPARIN SODIUM 5000 UNITS: 5000 INJECTION INTRAVENOUS; SUBCUTANEOUS at 09:31

## 2023-01-10 RX ADMIN — PANTOPRAZOLE SODIUM 40 MG: 40 TABLET, DELAYED RELEASE ORAL at 05:10

## 2023-01-10 RX ADMIN — HYDROCHLOROTHIAZIDE 50 MG: 25 TABLET ORAL at 09:31

## 2023-01-10 RX ADMIN — FOLIC ACID 0.5 MG: 1 TABLET ORAL at 09:31

## 2023-01-10 RX ADMIN — INSULIN LISPRO 10 UNITS: 100 INJECTION, SOLUTION INTRAVENOUS; SUBCUTANEOUS at 09:38

## 2023-01-10 RX ADMIN — Medication 5000 UNITS: at 09:37

## 2023-01-10 RX ADMIN — SODIUM CHLORIDE 50 ML/HR: 4.5 INJECTION, SOLUTION INTRAVENOUS at 02:18

## 2023-01-10 RX ADMIN — NYSTATIN: 100000 POWDER TOPICAL at 05:10

## 2023-01-10 RX ADMIN — INSULIN LISPRO 3 UNITS: 100 INJECTION, SOLUTION INTRAVENOUS; SUBCUTANEOUS at 09:39

## 2023-01-10 RX ADMIN — Medication 250 MG: at 21:39

## 2023-01-10 RX ADMIN — INSULIN DETEMIR 15 UNITS: 100 INJECTION, SOLUTION SUBCUTANEOUS at 21:39

## 2023-01-10 RX ADMIN — SODIUM CHLORIDE 50 ML/HR: 4.5 INJECTION, SOLUTION INTRAVENOUS at 21:45

## 2023-01-10 RX ADMIN — Medication 1 TABLET: at 09:38

## 2023-01-10 RX ADMIN — INSULIN LISPRO 3 UNITS: 100 INJECTION, SOLUTION INTRAVENOUS; SUBCUTANEOUS at 18:32

## 2023-01-10 RX ADMIN — ASPIRIN 81 MG CHEWABLE TABLET 81 MG: 81 TABLET CHEWABLE at 09:31

## 2023-01-10 RX ADMIN — TAZOBACTAM SODIUM AND PIPERACILLIN SODIUM 3.38 G: 375; 3 INJECTION, SOLUTION INTRAVENOUS at 12:57

## 2023-01-10 RX ADMIN — ATORVASTATIN CALCIUM 10 MG: 10 TABLET, FILM COATED ORAL at 09:38

## 2023-01-10 RX ADMIN — INSULIN LISPRO 3 UNITS: 100 INJECTION, SOLUTION INTRAVENOUS; SUBCUTANEOUS at 12:56

## 2023-01-10 RX ADMIN — CETIRIZINE HYDROCHLORIDE 5 MG: 10 TABLET, FILM COATED ORAL at 09:38

## 2023-01-10 RX ADMIN — INSULIN LISPRO 8 UNITS: 100 INJECTION, SOLUTION INTRAVENOUS; SUBCUTANEOUS at 18:31

## 2023-01-10 RX ADMIN — HEPARIN SODIUM 5000 UNITS: 5000 INJECTION INTRAVENOUS; SUBCUTANEOUS at 21:39

## 2023-01-10 RX ADMIN — INSULIN LISPRO 10 UNITS: 100 INJECTION, SOLUTION INTRAVENOUS; SUBCUTANEOUS at 12:56

## 2023-01-10 RX ADMIN — INSULIN LISPRO 14 UNITS: 100 INJECTION, SOLUTION INTRAVENOUS; SUBCUTANEOUS at 21:39

## 2023-01-10 RX ADMIN — TAZOBACTAM SODIUM AND PIPERACILLIN SODIUM 3.38 G: 375; 3 INJECTION, SOLUTION INTRAVENOUS at 21:39

## 2023-01-10 NOTE — PROGRESS NOTES
Cardiothoracic Surgery Progress Note      POD #: 4-transmetatarsal amputation right second toe with extensive soft tissue debridement wound left open to heal with secondary intent wound VAC     LOS: 5 days      Subjective: Awake this morning cheerful.    Objective: T-max today 99.1 °F  Vital SignsTemp:  [97.1 °F (36.2 °C)-99.1 °F (37.3 °C)] 97.1 °F (36.2 °C)  Heart Rate:  [] 73  Resp:  [18-21] 21  BP: (105-121)/(66-90) 116/66    Physical Exam:   General Appearance: Awake alert and oriented   Lungs:   Heart:   Skin:   Incision: Amputation site has wound VAC in place and dry dressing.  No drainage     Results:  Results from last 7 days   Lab Units 01/09/23  0256   WBC 10*3/mm3 8.53   HEMOGLOBIN g/dL 11.4*   HEMATOCRIT % 34.7   PLATELETS 10*3/mm3 313     Results from last 7 days   Lab Units 01/09/23  0256   SODIUM mmol/L 137   POTASSIUM mmol/L 3.9   CHLORIDE mmol/L 99   CO2 mmol/L 28.0   BUN mg/dL 15   CREATININE mg/dL 1.00   GLUCOSE mg/dL 352*   CALCIUM mg/dL 9.2         Assessment: #1.  Postop day 4 right second toe transmetatarsal amputation wound left open to heal by second intent wound VAC  2 diabetes mellitus and diabetic neuropathy with  plantar surface ulceration metatarsal head area        Plan: Wound VAC management PT wound care.  Medical manage per hospitalist.  Antibiotics infectious disease.      Javon Raygoza MD - 01/10/23 - 05:30 EST

## 2023-01-10 NOTE — THERAPY WOUND CARE TREATMENT
Acute Care - Wound/Debridement Treatment Note  [facility]     Patient Name: Maura Leon  : 1960  MRN: [id number]  Today's Date: 1/10/2023                Admit Date: 2023    Visit Dx:    ICD-10-CM ICD-9-CM   1. Cellulitis of right foot  L03.115 682.7   2. History of diabetes mellitus, type II  Z86.39 V12.29       Patient Active Problem List   Diagnosis   • Sepsis (HCC)   • Ulcer of right foot (HCC)   • Type 2 diabetes mellitus (HCC)   • Psoriatic arthritis (HCC)   • Immunocompromised (HCC)   • GERD (gastroesophageal reflux disease)   • Cellulitis of right foot        Past Medical History:   Diagnosis Date   • Arthritis    • Diabetes mellitus (HCC)    • GERD (gastroesophageal reflux disease)    • Irritable bowel    • Sleep apnea         Past Surgical History:   Procedure Laterality Date   • AMPUTATION DIGIT Right 2023    Procedure: FOOT SURGICAL EXPLORATION WITH TOE AMPUTATION RIGHT;  Surgeon: Javon Raygoza MD;  Location: [facility];  Service: General;  Laterality: Right;   • TOE SURGERY Right 2022           Wound 23 2315 Right posterior plantar Diabetic Ulcer (Active)   Base dressing in place, unable to visualize 23       Wound 23 1733 Right anterior foot Incision (Active)   Dressing Appearance dry;intact 01/10/23 0800   Closure SORAIDA 23   Base moist;pink;red;slough;subcutaneous;yellow 01/10/23 0800   Periwound dry;intact;pink 01/10/23 0800   Periwound Temperature warm 01/10/23 0800   Periwound Skin Turgor soft 01/10/23 0800   Edges open 01/10/23 0800   Drainage Characteristics/Odor serosanguineous 01/10/23 0800   Drainage Amount small 01/10/23 0800   Care, Wound irrigated with;sterile normal saline;negative pressure wound therapy 01/10/23 0800   Dressing Care dressing changed 01/10/23 0800   Periwound Care dry periwound area maintained 01/10/23 08   Wound Output (mL) 50 01/10/23 08       NPWT (Negative Pressure Wound Therapy) 23 0900 R 2nd toe  amputation site (Active)   Therapy Setting continuous therapy 01/10/23 0800   Dressing foam, black;foam, white 01/10/23 0800   Contact Layer other (see comments) 01/10/23 0800   Pressure Setting 150 mmHg 01/10/23 0800   Sponges Inserted 2 01/10/23 0800   Sponges Removed 2 01/10/23 0800   Finger sweep complete Yes 01/10/23 0800         WOUND DEBRIDEMENT  Total area of Debridement: ~5cm2  Debridement Site 1  Location- Site 1: Amputation site  Selective Debridement- Site 1: Wound Surface <20cmsq  Instruments- Site 1: tweezers  Excised Tissue Description- Site 1: minimum, slough  Bleeding- Site 1: none               PT Assessment (last 12 hours)     PT Evaluation and Treatment     Row Name 01/10/23 0800          Physical Therapy Time and Intention    Subjective Information complains of;weakness;fatigue  -MF     Document Type therapy note (daily note);wound care  -MF     Mode of Treatment individual therapy;physical therapy  -MF     Row Name 01/10/23 0800          Pain    Pretreatment Pain Rating 0/10 - no pain  -MF     Posttreatment Pain Rating 0/10 - no pain  -MF     Row Name             Wound 01/05/23 2315 Right posterior plantar Diabetic Ulcer    Wound - Properties Group Placement Date: 01/05/23  -DP Placement Time: 2315  -DP Present on Hospital Admission: Y  -DP Side: Right  -DP Orientation: posterior  -DP Location: plantar  -DP Primary Wound Type: Diabetic ulc  -DP    Retired Wound - Properties Group Placement Date: 01/05/23  -DP Placement Time: 2315  -DP Present on Hospital Admission: Y  -DP Side: Right  -DP Orientation: posterior  -DP Location: plantar  -DP Primary Wound Type: Diabetic ulc  -DP    Retired Wound - Properties Group Date first assessed: 01/05/23  -DP Time first assessed: 2315  -DP Present on Hospital Admission: Y  -DP Side: Right  -DP Location: plantar  -DP Primary Wound Type: Diabetic ulc  -DP    Row Name 01/10/23 0800          Wound 01/06/23 1733 Right anterior foot Incision    Wound - Properties  Group Placement Date: 01/06/23  -EQ Placement Time: 1733  -EQ Present on Hospital Admission: N  -EQ Side: Right  -EQ Orientation: anterior  -EQ Location: foot  -EQ Primary Wound Type: Incision  -EQ    Dressing Appearance dry;intact  -MF     Base moist;pink;red;slough;subcutaneous;yellow  -MF     Periwound dry;intact;pink  -MF     Periwound Temperature warm  -MF     Periwound Skin Turgor soft  -MF     Edges open  -MF     Drainage Characteristics/Odor serosanguineous  -MF     Drainage Amount small  -MF     Care, Wound irrigated with;sterile normal saline;negative pressure wound therapy  -MF     Dressing Care dressing changed  -MF     Periwound Care dry periwound area maintained  -MF     Wound Output (mL) 50  -MF     Retired Wound - Properties Group Placement Date: 01/06/23 -EQ Placement Time: 1733  -EQ Present on Hospital Admission: N  -EQ Side: Right  -EQ Orientation: anterior  -EQ Location: foot  -EQ Primary Wound Type: Incision  -EQ    Retired Wound - Properties Group Date first assessed: 01/06/23  -EQ Time first assessed: 1733  -EQ Present on Hospital Admission: N  -EQ Side: Right  -EQ Location: foot  -EQ Primary Wound Type: Incision  -EQ    Row Name 01/10/23 0800          NPWT (Negative Pressure Wound Therapy) 01/07/23 0900 R 2nd toe amputation site    NPWT (Negative Pressure Wound Therapy) - Properties Group Placement Date: 01/07/23 - Placement Time: 0900 -LH Location: R 2nd toe amputation site  -LH    Therapy Setting continuous therapy  -MF     Dressing foam, black;foam, white  -MF     Contact Layer other (see comments)  rob Ag  -MF     Pressure Setting 150 mmHg  -MF     Sponges Inserted 2  1 white 1 black  -MF     Sponges Removed 2  -MF     Finger sweep complete Yes  -MF     Retired NPWT (Negative Pressure Wound Therapy) - Properties Group Placement Date: 01/07/23 - Placement Time: 0900 -LH Location: R 2nd toe amputation site  -LH    Retired NPWT (Negative Pressure Wound Therapy) - Properties Group  Placement Date: 01/07/23  - Placement Time: 0900  - Location: R 2nd toe amputation site  -    Row Name 01/10/23 0800          Coping    Observed Emotional State calm;cooperative;pleasant  -     Verbalized Emotional State acceptance  -     Trust Relationship/Rapport care explained  -     Row Name 01/10/23 0800          Plan of Care Review    Plan of Care Reviewed With patient  -MF     Outcome Evaluation wound vac changed and MD able to assess wound. good granulation formation noted and pt progressing well, but still with bone and fascia noted to wound base. PT will cont with wound vac changes every 2-3 days.  -     Row Name 01/10/23 0800          Positioning and Restraints    Pre-Treatment Position in bed  -     Post Treatment Position bed  -     In Bed supine;call light within reach  -           User Key  (r) = Recorded By, (t) = Taken By, (c) = Cosigned By    Initials Name Provider Type    MF Kevin Marin, PT Physical Therapist     Rafy Chauhan, PT Physical Therapist    Aicha Thomas, RN Registered Nurse     Destiny Go RN Registered Nurse              Physical Therapy Education     Title: PT OT SLP Therapies (In Progress)     Topic: Physical Therapy (Done)     Point: Mobility training (Done)     Learning Progress Summary           Patient Acceptance, E, VU by AE at 1/9/2023 1108    Acceptance, E, VU,NR by KG at 1/7/2023 1529                   Point: Home exercise program (Done)     Learning Progress Summary           Patient Acceptance, E, VU by AE at 1/9/2023 1108    Acceptance, E, VU,NR by KG at 1/7/2023 1529                   Point: Body mechanics (Done)     Learning Progress Summary           Patient Acceptance, E, VU by AE at 1/9/2023 1108    Acceptance, E, VU,NR by KG at 1/7/2023 1529                   Point: Precautions (Done)     Learning Progress Summary           Patient Acceptance, E, VU by AE at 1/9/2023 1108    Acceptance, E, VU,NR by KG at 1/7/2023 1529                                User Key     Initials Effective Dates Name Provider Type Discipline    KG 01/04/23 -  Kaity Narvaez Physical Therapist PT    AE 09/21/21 -  Brian Horton PT Physical Therapist PT                Recommendation and Plan  Anticipated Equipment Needs at Discharge (PT): front wheeled walker  Anticipated Discharge Disposition (PT): inpatient rehabilitation facility  Planned Therapy Interventions (PT): wound care, patient/family education  Therapy Frequency (PT): daily  Plan of Care Reviewed With: patient           Outcome Evaluation: wound vac changed and MD able to assess wound. good granulation formation noted and pt progressing well, but still with bone and fascia noted to wound base. PT will cont with wound vac changes every 2-3 days.  Plan of Care Reviewed With: patient            Time Calculation   PT Charges     Row Name 01/10/23 0800             Time Calculation    Start Time 0800  -MF      PT Goal Re-Cert Due Date 01/17/23  -MF         Untimed Charges    Wound Care 36605 Selective debridement  -MF      39799-Idtfeltne debridement 10  -MF      53997-Bmz Pressure wound to 50 sqcm 25  -MF         Total Minutes    Untimed Charges Total Minutes 35  -MF       Total Minutes 35  -MF            User Key  (r) = Recorded By, (t) = Taken By, (c) = Cosigned By    Initials Name Provider Type     Kevin Marin, PT Physical Therapist                  Therapy Charges for Today     Code Description Service Date Service Provider Modifiers Qty    18177450999 HC PT NEG PRESS WOUND TO 50SQCM DME1 1/9/2023 Kevin Marin, PT  1    00430489226 HC KELLEN DEBRIDE OPEN WOUND UP TO 20CM 1/10/2023 Kevin Marin, PT GP 1    76208853351 HC PT NEG PRESS WOUND TO 50SQCM DME2 1/10/2023 Kevin Marin, PT GP 1            PT G-Codes  Outcome Measure Options: AM-PAC 6 Clicks Daily Activity (OT)  AM-PAC 6 Clicks Score (PT): 19  AM-PAC 6 Clicks Score (OT): 15       Kevin Marin  PT  1/10/2023

## 2023-01-10 NOTE — PROGRESS NOTES
[facility]  PROGRESS NOTE    Patient Name: Maura Leon  : 1960  MRN: [id number]    Date of Admission: 2023  Primary Care Physician: Emanuel Fuentes DPM    Subjective   Subjective     CC:  Diabetic ulcer    HPI:  Patient sitting up in bed in NAD with wound VAC on right foot.  Patient hoping to get discharged to rehab as soon as possible and states she is working well with the knee scooter.  Blood sugars are still in the 300s, so will adjust patient's medication.  She states that her A1c has improved significantly with Ozempic. No complaints.    ROS:  Gen- No fevers, chills  CV- No chest pain, palpitations  Resp- No cough, dyspnea  GI- No N/V/D, abd pain  All other systems have been reviewed and the pertinent positives and negatives are listed above in the HPI or ROS      Objective   Objective     Vital Signs:   Temp:  [97.1 °F (36.2 °C)-99.1 °F (37.3 °C)] 97.8 °F (36.6 °C)  Heart Rate:  [] 71  Resp:  [18-21] 18  BP: (105-127)/(66-94) 127/94     Physical Exam:  Constitutional: Alert, morbidly obese female sitting up in bed with wound VAC on the top of right foot  Eyes: EOMI, sclerae anicteric, no conjunctival injection  Head: NCAT  ENT: Jud, moist mucous membranes   Respiratory: Nonlabored, symmetrical chest expansion, CTAB, RA  Cardiovascular: RRR, HR 76, no M/R/G  Gastrointestinal: Morbidly obese, soft, NT, ND +BS  Musculoskeletal: RENEE; no LE edema bilaterally; feet in waffle boots bilaterally  Neurologic: Oriented x4, strength symmetric in all extremities, follows all commands, speech clear  Skin: No rashes on exposed skin; right foot wrapped with wound VAC in place-CDI  Psychiatric: Pleasant and cooperative; normal affect    Results Reviewed:  LAB RESULTS:      Lab 23  0256 23  0353 23  0411 23  1755   WBC 8.53 13.24* 16.19* 20.01*   HEMOGLOBIN 11.4* 11.7* 11.5* 13.1   HEMATOCRIT 34.7 36.0 34.2 39.5   PLATELETS 313 275 287 367    NEUTROS ABS  --  11.18* 13.72* 17.97*   IMMATURE GRANS (ABS)  --  0.08* 0.12* 0.08*   LYMPHS ABS  --  0.84 1.05 0.90   MONOS ABS  --  0.96* 1.20* 0.97*   EOS ABS  --  0.12 0.03 0.01   MCV 96.7 97.0 94.0 94.5   SED RATE  --   --   --  74*   CRP  --   --   --  12.67*   PROCALCITONIN  --   --   --  0.35*   LACTATE  --   --   --  1.9         Lab 01/09/23  0256 01/07/23  0353 01/06/23  0411 01/05/23  1755   SODIUM 137 135* 137 135*   POTASSIUM 3.9 3.9 3.1* 3.6   CHLORIDE 99 102 99 95*   CO2 28.0 19.0* 27.0 27.0   ANION GAP 10.0 14.0 11.0 13.0   BUN 15 16 14 16   CREATININE 1.00 0.96 0.77 0.94   EGFR 64.2 67.5 87.9 69.2   GLUCOSE 352* 211* 189* 364*   CALCIUM 9.2 8.8 9.3 9.7   MAGNESIUM  --   --  1.9  --    HEMOGLOBIN A1C  --   --  8.20*  --          Lab 01/09/23  0256 01/05/23 1755   TOTAL PROTEIN 6.1 8.3   ALBUMIN 3.2* 4.3   GLOBULIN 2.9 4.0   ALT (SGPT) 43* 44*   AST (SGOT) 22 25   BILIRUBIN 0.6 1.2   ALK PHOS 98 113                 Lab 01/06/23  1155   ABO TYPING O   RH TYPING Positive   ANTIBODY SCREEN Negative         Brief Urine Lab Results     None          Microbiology Results Abnormal     Procedure Component Value - Date/Time    Blood Culture - Blood, Arm, Left [[id number]]  (Normal) Collected: 01/05/23 2105    Lab Status: Preliminary result Specimen: Blood from Arm, Left Updated: 01/09/23 2131     Blood Culture No growth at 4 days    Blood Culture - Blood, Arm, Left [[id number]]  (Normal) Collected: 01/05/23 1758    Lab Status: Preliminary result Specimen: Blood from Arm, Left Updated: 01/09/23 1815     Blood Culture No growth at 4 days    Anaerobic Culture - Wound, Toe, Right [[id number]]  (Normal) Collected: 01/06/23 1733    Lab Status: Preliminary result Specimen: Wound from Toe, Right Updated: 01/09/23 0747     Anaerobic Culture No anaerobes isolated at 3 days    Anaerobic Culture - Tissue, Toe, Right [[id number]]  (Normal) Collected: 01/06/23 1832    Lab Status: Preliminary result Specimen: Tissue from Toe,  Right Updated: 01/09/23 0747     Anaerobic Culture No anaerobes isolated at 3 days    Wound Culture - Swab, Foot, Right [[id number]] Collected: 01/05/23 2201    Lab Status: Final result Specimen: Swab from Foot, Right Updated: 01/08/23 0730     Wound Culture Moderate growth (3+) Normal Skin Kayli     Gram Stain No WBCs or organisms seen          No radiology results from the last 24 hrs        I have reviewed the medications:  Scheduled Meds:artificial tears, , Both Eyes, Nightly  aspirin, 81 mg, Oral, Daily  atorvastatin, 10 mg, Oral, Daily  Calcium Carb-Cholecalciferol, 1 tablet, Oral, Daily  cetirizine, 5 mg, Oral, Daily  folic acid, 0.5 mg, Oral, Daily  heparin (porcine), 5,000 Units, Subcutaneous, Q12H  hydroCHLOROthiazide, 50 mg, Oral, Daily  insulin detemir, 15 Units, Subcutaneous, Nightly  insulin lispro, 0-14 Units, Subcutaneous, TID AC  Insulin Lispro, 3 Units, Subcutaneous, TID With Meals  nystatin, , Topical, Q8H  pantoprazole, 40 mg, Oral, Q AM  piperacillin-tazobactam, 3.375 g, Intravenous, Q8H  saccharomyces boulardii, 250 mg, Oral, BID  sodium chloride, 10 mL, Intravenous, Q12H  vitamin D3, 5,000 Units, Oral, Daily      Continuous Infusions:sodium chloride, 50 mL/hr, Last Rate: 50 mL/hr (01/10/23 0218)      PRN Meds:.•  [DISCONTINUED] acetaminophen **OR** acetaminophen **OR** acetaminophen  •  acetaminophen  •  betamethasone (augmented)  •  dextrose  •  dextrose  •  glucagon (human recombinant)  •  HYDROcodone-acetaminophen  •  HYDROmorphone **AND** naloxone  •  Morphine **AND** naloxone  •  ondansetron **OR** ondansetron  •  potassium chloride **OR** potassium chloride **OR** potassium chloride  •  sodium chloride  •  sodium chloride    Assessment & Plan   Assessment & Plan     Active Hospital Problems    Diagnosis  POA   • **Sepsis (HCC) [A41.9]  Yes   • Ulcer of right foot (HCC) [L97.519]  Yes   • Type 2 diabetes mellitus (HCC) [E11.9]  Yes   • Psoriatic arthritis (HCC) [L40.50]  Yes   •  Immunocompromised (HCC) [D84.9]  Yes   • GERD (gastroesophageal reflux disease) [K21.9]  Yes   • Cellulitis of right foot [L03.115]  Unknown      Resolved Hospital Problems   No resolved problems to display.        Brief Hospital Course to date:  62 year old female with history of type 2 diabetes, psoriatic arthritis on immunosuppressive medications who presents with a right foot ulcer.  Patient was noted to have a leukocytosis of 20,000, and elevated procalcitonin and elevated CRP.  X-ray of right foot showed soft tissue swelling throughout the forefoot and midfoot jesting cellulitis, suggestion of gas production the soft tissues of the first digit and extending towards the second digit. MRI of the right foot no abscess or osteomyelitis.    These problems are new to me today    This patient's problems and plans were partially entered by my partner and updated as appropriate by me 01/10/23.     Sepsis secondary to right foot ulcer with cellulitis in the setting of type 2 diabetes, Immunocompromised state  -Continue dapto and zosyn.  -MRSA +  -Wound culture with alpha strep  -S/p R 2nd toe transmetatarsal amputation with Dr. Raygoza on 1/6/2023  --Wound VAC in place.  --Holding methotrexate  -ID and surg following  --AM labs on 1/9/2023 unremarkable    T2DM w/ A1c 8.2  --24H glucose 307-384  --Increased Levemir 15 units Q12H; adjust as needed  --Patient takes Tresiba 60 units AM and 80 units PM  --Mealtime bolus Humalog 3 units before meals with hold parameters    Acute anemia-stable  --D/t postop blood loss; admitting Hgb 13.1  --Hgb 11.4     Psoriatic arthritis   -on methotrexate: hold for now      GERD  -PPI      Hyperlipidemia  -continue statin      DVT prophylaxis:  scds to LLE      Expected Discharge Location and Transportation: home vs rehab  Expected Discharge   Expected Discharge Date and Time     Expected Discharge Date Expected Discharge Time    Jan 13, 2023            DVT prophylaxis:  Medical and  mechanical DVT prophylaxis orders are present.     AM-PAC 6 Clicks Score (PT): 19 (01/09/23 2000)    CODE STATUS:   Code Status and Medical Interventions:   Ordered at: 01/05/23 7953     Level Of Support Discussed With:    Patient     Code Status (Patient has no pulse and is not breathing):    CPR (Attempt to Resuscitate)     Medical Interventions (Patient has pulse or is breathing):    Full Support       Ana Carrera, JOEY  01/10/23

## 2023-01-10 NOTE — PROGRESS NOTES
"Maura Leon  1960  [id number]     Chief Complaint:  Right foot infection    Reason for Consultation: right foot infection    History of present illness:     Patient is a 62 y.o.  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  Follows with podiatry in [place]; she has diabetes with chronic sensory neuropathy and has not noticed any recent exposures.  No specific blunt force or penetrating trauma.  She has not stepped on anything she knows of.  No animal insect or arthropod bite.  No fresh/brackish/salt water exposure.  No prior history MRSA VRE C. difficile or ESBL/KP C/CRE organisms.    She is admitted on January 5, 2023 with acute deterioration at the right foot.  She had just noticed a wound on the plantar side of her foot when she had spontaneous drainage and does not know how long it had been there.  She has no pain because of her sensory neuropathy.  She developed acute redness/swelling and was told to come to the emergency room by her podiatrist.  She was noted to have fever/leukocytosis with elevated procalcitonin, sepsis per admission H&P and abnormal MRI with plantar foot wound/ulceration over the second MTP joint with adjacent soft tissue and inflammatory phlegmon but no focal fluid collection per radiology and no osteomyelitis per radiology.  Initial x-ray did raise concern for gas in the soft tissue per radiology.  Dr. Raygoza has seen the patient and he is planning for surgical debridement on January 6 1/6/23 surgery by Dr Raygoza:  \"Pre-op Diagnosis: Gas gangrene of the right foot involving the plantar surface of the right second toe metatarsal head area  Post-op Diagnosis:   Same with extensive necrotizing fasciitis of the right second toe involving the flexor and extensor tendons.\"    \"Procedure(s): Right second " "toe transmetatarsal amputation through the distal third of the second metatarsal bone with extensive sharp soft tissue debridement.  Wound left open to heal by secondary intent/wound VAC therapy\"    1/6 MRSA surveillance culture positive    1/10/23 physical therapy reports that VAC change to occur today.operative cultures from foot with S Mitis, anaerobic culture pending.    No fever;  postop no pain at the right foot with her sensory neuropathy.  She has redness/swelling  Generally improving since surgery.  She denies any other specific focal complaints    No headache photophobia or neck stiffness.  No shortness of breath cough or hemoptysis.  No nausea vomiting diarrhea or abdominal pain.  No dysuria hematuria or pyuria.  No skin rash    Review of Systems    1/10/23    Constitutional-- No  chills or sweats.  Appetite better  Heent-- No new vision, hearing or throat complaints.  No epistaxis or oral sores.  Denies odynophagia or dysphagia.  No flashers, floaters or eye pain. No odynophagia or dysphagia. No headache, photophobia or neck stiffness.  CV-- No chest pain, palpitation or syncope  Resp-- No SOB/cough/Hemoptysis  GI- No nausea, vomiting, or diarrhea.  No hematochezia, melena, or hematemesis. Denies jaundice or chronic liver disease.  -- No dysuria, hematuria, or flank pain.  Denies hesitancy, urgency or flank pain.  Lymph- no swollen lymph nodes in neck/axilla or groin.   Heme- No active bruising or bleeding; no Hx of DVT or PE.  MS-- no acute swelling or pain in the bones or joints of arms/legs aside from above.  No new back pain.  Neuro-- No acute focal weakness or numbness in the arms or legs.  No seizures.    Full 12 point review of systems reviewed and negative otherwise for acute complaints, except for above    Physical Exam: Nursing/chaperone present  Vital Signs   /66 (BP Location: Left arm, Patient Position: Lying)   Pulse 73   Temp 97.1 °F (36.2 °C) (Oral)   Resp 21   Ht 167.6 cm " "(66\")   Wt 116 kg (255 lb)   SpO2 98%   BMI 41.16 kg/m²     GENERAL: Awake and alert, in no acute distress.   HEENT: Normocephalic, atraumatic.   . No conjunctival injection. No icterus. Oropharynx clear without evidence of thrush or exudate. No evidence of peridontal disease.    NECK: Supple without nuchal rigidity. No mass.   HEART: RRR; No murmur, rubs, gallops.   LUNGS: Clear to auscultation bilaterally without wheezing, rales, rhonchi. Normal respiratory effort. Nonlabored. No dullness.  ABDOMEN: Soft, nontender, nondistended. Positive bowel sounds. No rebound or guarding. NO mass or HSM.  EXT:  No cyanosis, clubbing . No cord.  See below   MSK: FROM without joint effusions noted arms/legs.    SKIN: Warm and dry without cutaneous eruptions on Inspection/palpation.  See below  NEURO: Oriented to PPT. No focal deficits on motor/sensory exam at arms/legs.    Right foot surgical site noted with VAC;  Redness at foot no progression.  No discrete mass bulge or fluctuance.  No crepitus or bulla.  Partial right hallux amputation noted with healed surgical site.  Onychomycosis noted with thickened nails; no new purulence    No peripheral stigmata/phenomena of endocarditis    IV without obvious redness or drainage    Laboratory Data    Results from last 7 days   Lab Units 01/09/23  0256 01/07/23  0353 01/06/23  0411   WBC 10*3/mm3 8.53 13.24* 16.19*   HEMOGLOBIN g/dL 11.4* 11.7* 11.5*   HEMATOCRIT % 34.7 36.0 34.2   PLATELETS 10*3/mm3 313 275 287     Results from last 7 days   Lab Units 01/09/23  0256   SODIUM mmol/L 137   POTASSIUM mmol/L 3.9   CHLORIDE mmol/L 99   CO2 mmol/L 28.0   BUN mg/dL 15   CREATININE mg/dL 1.00   GLUCOSE mg/dL 352*   CALCIUM mg/dL 9.2     Results from last 7 days   Lab Units 01/09/23  0256   ALK PHOS U/L 98   BILIRUBIN mg/dL 0.6   ALT (SGPT) U/L 43*   AST (SGOT) U/L 22     Results from last 7 days   Lab Units 01/05/23  1755   SED RATE mm/hr 74*     Results from last 7 days   Lab Units " 01/05/23  1755   CRP mg/dL 12.67*       Estimated Creatinine Clearance: 76.5 mL/min (by C-G formula based on SCr of 1 mg/dL).      Microbiology:      Radiology:  Imaging Results (Last 72 Hours)     ** No results found for the last 72 hours. **            Impression:     --Acute sepsis present on admission per medicine notes, in the setting of chronic immunosuppression/psoriatic arthritis and acute deterioration in her right foot with right foot infection most likely source.  Significant leukocytosis with abnormal procalcitonin and abnormal imaging.     Timing/option of threshold including extent for any further debridement/amputation at discretion of Dr. Raygoza; surgery 1/6 as above    --acute /severe right foot infection with necrotizing fasciitis at surgery as above    **Cx with S Mitis so far;  Anaerobic culture pending    --MRSA surveillance culture positivity; no MRSA at her foot per microbiology    -- Psoriatic arthritis with chronic methotrexate, currently on hold by medicine team.    -- Diabetes with chronic sensory neuropathy.  She understands the importance of protecting her feet.  You need to tightly control blood sugar to give best chance for healing    -- Antibiotic allergies as above to clindamycin, doxycycline, sulfa and fluoroquinolones      PLAN:      -- IV  Zosyn for now.  Depending on further culture data and clinical course, potentially taper further in upcoming days    -- Check/review labs cultures and scans    -- Partial history per nursing staff    -- Discussed with microbiology    -- Highly complex set of issues with high risk for further serious morbidity and other serious sequela    -- Discussed with Dr. Raygoza.  VAC at present      **pathology pending    Copied text in this note has been reviewed and is accurate as of 01/10/23.        Kevin Abdul MD  1/10/2023

## 2023-01-10 NOTE — PLAN OF CARE
Goal Outcome Evaluation:  Plan of Care Reviewed With: patient        Progress: no change  Outcome Evaluation: Pt ambulated 50ft with CGA and knee scooter. Pt continues to be limited by decreased endurance and weakness. Pt requires cues to improve sequencing with STS with knee scooter and sequencing during turns. Further distance limited by fatigue. Continue to progress per pt tolerance.

## 2023-01-10 NOTE — PLAN OF CARE
Goal Outcome Evaluation:  Plan of Care Reviewed With: patient           Outcome Evaluation: wound vac changed and MD able to assess wound. good granulation formation noted and pt progressing well, but still with bone and fascia noted to wound base. PT will cont with wound vac changes every 2-3 days.

## 2023-01-10 NOTE — THERAPY TREATMENT NOTE
Patient Name: Maura Leon  : 1960    MRN: [id number]                              Today's Date: 1/10/2023       Admit Date: 2023    Visit Dx:     ICD-10-CM ICD-9-CM   1. Cellulitis of right foot  L03.115 682.7   2. History of diabetes mellitus, type II  Z86.39 V12.29     Patient Active Problem List   Diagnosis   • Sepsis (HCC)   • Ulcer of right foot (HCC)   • Type 2 diabetes mellitus (HCC)   • Psoriatic arthritis (HCC)   • Immunocompromised (HCC)   • GERD (gastroesophageal reflux disease)   • Cellulitis of right foot     Past Medical History:   Diagnosis Date   • Arthritis    • Diabetes mellitus (HCC)    • GERD (gastroesophageal reflux disease)    • Irritable bowel    • Sleep apnea      Past Surgical History:   Procedure Laterality Date   • AMPUTATION DIGIT Right 2023    Procedure: FOOT SURGICAL EXPLORATION WITH TOE AMPUTATION RIGHT;  Surgeon: Javon Raygoza MD;  Location: [facility];  Service: General;  Laterality: Right;   • TOE SURGERY Right 2022      General Information     Row Name 01/10/23 1615          Physical Therapy Time and Intention    Document Type therapy note (daily note)  -AE     Mode of Treatment physical therapy  -AE     Row Name 01/10/23 1615          General Information    Patient Profile Reviewed yes  -AE     Existing Precautions/Restrictions other (see comments);fall;non-weight bearing  2nd L transmetatarsal amputation; LLE NWB; wound vac  -AE     Barriers to Rehab medically complex  -AE     Row Name 01/10/23 1615          Cognition    Orientation Status (Cognition) oriented x 4  -AE     Row Name 01/10/23 1615          Safety Issues, Functional Mobility    Safety Issues Affecting Function (Mobility) awareness of need for assistance;insight into deficits/self-awareness;safety precaution awareness;safety precautions follow-through/compliance;sequencing abilities  -AE     Impairments Affecting Function (Mobility) balance;endurance/activity tolerance;postural/trunk  "control;sensation/sensory awareness;coordination  -AE           User Key  (r) = Recorded By, (t) = Taken By, (c) = Cosigned By    Initials Name Provider Type    AE Brian Horton, PT Physical Therapist               Mobility     Row Name 01/10/23 1616          Bed Mobility    Bed Mobility supine-sit  -AE     Supine-Sit Donley (Bed Mobility) contact guard;1 person assist  -AE     Assistive Device (Bed Mobility) bed rails;head of bed elevated  -AE     Comment, (Bed Mobility) VCs for hand placement and sequencing. Pt demo good use of bed rails to assist with supine-sit transfer.  -AE     Row Name 01/10/23 1616          Transfers    Comment, (Transfers) VCs for hand placement and sequencing. Pt requires minimal cues for adherence to NWB restrictions.  -AE     Row Name 01/10/23 1616          Sit-Stand Transfer    Sit-Stand Donley (Transfers) minimum assist (75% patient effort);verbal cues;1 person assist  -AE     Assistive Device (Sit-Stand Transfers) walker, knee scooter  -AE     Row Name 01/10/23 1616          Gait/Stairs (Locomotion)    Donley Level (Gait) contact guard;1 person assist  -AE     Assistive Device (Gait) walker, knee scooter  -AE     Distance in Feet (Gait) 50  -AE     Deviations/Abnormal Patterns (Gait) bilateral deviations;gait speed decreased  -AE     Bilateral Gait Deviations forward flexed posture  -AE     Comment, (Gait/Stairs) Pt ambulated 50ft with CGA and knee scooter. Pt continues to demo decreased endurance with mobility and BLE weakness. Pt reports \"burning\" in glutes and adductors this session. Further distance limited by fatigue.  -AE     Row Name 01/10/23 1616          Mobility    Extremity Weight-bearing Status left lower extremity  -AE     Left Lower Extremity (Weight-bearing Status) non weight-bearing (NWB)  -AE           User Key  (r) = Recorded By, (t) = Taken By, (c) = Cosigned By    Initials Name Provider Type    AE Brian Horton, PT Physical Therapist       "         Obj/Interventions     Row Name 01/10/23 1618          Balance    Balance Assessment sitting static balance;sitting dynamic balance;sit to stand dynamic balance;standing static balance;standing dynamic balance  -AE     Static Sitting Balance standby assist  -AE     Dynamic Sitting Balance standby assist  -AE     Position, Sitting Balance unsupported;sitting edge of bed  -AE     Sit to Stand Dynamic Balance minimal assist;1-person assist  -AE     Static Standing Balance contact guard  -AE     Dynamic Standing Balance contact guard  -AE     Position/Device Used, Standing Balance supported  knee scooter  -AE           User Key  (r) = Recorded By, (t) = Taken By, (c) = Cosigned By    Initials Name Provider Type    AE Brian Horton, PT Physical Therapist               Goals/Plan    No documentation.                Clinical Impression     Row Name 01/10/23 1619          Pain    Pretreatment Pain Rating 0/10 - no pain  -AE     Posttreatment Pain Rating 0/10 - no pain  -AE     Row Name 01/10/23 1619          Plan of Care Review    Progress no change  -AE     Outcome Evaluation Pt ambulated 50ft with CGA and knee scooter. Pt continues to be limited by decreased endurance and weakness. Pt requires cues to improve sequencing with STS with knee scooter and sequencing during turns. Further distance limited by fatigue. Continue to progress per pt tolerance.  -AE     Row Name 01/10/23 1619          Vital Signs    Pre Systolic BP Rehab 127  -AE     Pre Treatment Diastolic BP 94  -AE     Pretreatment Heart Rate (beats/min) 72  -AE     Posttreatment Heart Rate (beats/min) 84  -AE     O2 Delivery Pre Treatment room air  -AE     O2 Delivery Intra Treatment room air  -AE     O2 Delivery Post Treatment room air  -AE     Pre Patient Position Supine  -AE     Intra Patient Position Standing  -AE     Post Patient Position Sitting  -AE     Row Name 01/10/23 1619          Positioning and Restraints    Pre-Treatment Position in bed   -AE     Post Treatment Position chair  -AE     In Chair notified nsg;reclined;call light within reach;encouraged to call for assist;exit alarm on;waffle cushion;legs elevated;compression device;waffle boot/both  -AE           User Key  (r) = Recorded By, (t) = Taken By, (c) = Cosigned By    Initials Name Provider Type    AE Brian Horton, PT Physical Therapist               Outcome Measures     Row Name 01/10/23 1621          How much help from another person do you currently need...    Turning from your back to your side while in flat bed without using bedrails? 4  -AE     Moving from lying on back to sitting on the side of a flat bed without bedrails? 3  -AE     Moving to and from a bed to a chair (including a wheelchair)? 3  -AE     Standing up from a chair using your arms (e.g., wheelchair, bedside chair)? 3  -AE     Climbing 3-5 steps with a railing? 2  -AE     To walk in hospital room? 3  -AE     AM-PAC 6 Clicks Score (PT) 18  -AE     Highest level of mobility 6 --> Walked 10 steps or more  -AE     Row Name 01/10/23 1621          Functional Assessment    Outcome Measure Options AM-PAC 6 Clicks Basic Mobility (PT)  -AE           User Key  (r) = Recorded By, (t) = Taken By, (c) = Cosigned By    Initials Name Provider Type    Brian Quintana PT Physical Therapist                             Physical Therapy Education     Title: PT OT SLP Therapies (In Progress)     Topic: Physical Therapy (Done)     Point: Mobility training (Done)     Learning Progress Summary           Patient Acceptance, E, VU by AE at 1/10/2023 1535    Acceptance, E, VU by AE at 1/9/2023 1108    Acceptance, E, VU,NR by KG at 1/7/2023 1529                   Point: Home exercise program (Done)     Learning Progress Summary           Patient Acceptance, E, VU by AE at 1/10/2023 1535    Acceptance, E, VU by AE at 1/9/2023 1108    Acceptance, E, VU,NR by KG at 1/7/2023 1529                   Point: Body mechanics (Done)     Learning  Progress Summary           Patient Acceptance, E, VU by AE at 1/10/2023 1535    Acceptance, E, VU by AE at 1/9/2023 1108    Acceptance, E, VU,NR by KG at 1/7/2023 1529                   Point: Precautions (Done)     Learning Progress Summary           Patient Acceptance, E, VU by AE at 1/10/2023 1535    Acceptance, E, VU by AE at 1/9/2023 1108    Acceptance, E, VU,NR by KG at 1/7/2023 1529                               User Key     Initials Effective Dates Name Provider Type Discipline    KG 01/04/23 -  Kaity Narvaez Physical Therapist PT    AE 09/21/21 -  Brian Horton, PT Physical Therapist PT              PT Recommendation and Plan     Plan of Care Reviewed With: patient  Progress: no change  Outcome Evaluation: Pt ambulated 50ft with CGA and knee scooter. Pt continues to be limited by decreased endurance and weakness. Pt requires cues to improve sequencing with STS with knee scooter and sequencing during turns. Further distance limited by fatigue. Continue to progress per pt tolerance.     Time Calculation:    PT Charges     Row Name 01/10/23 1621 01/10/23 0800          Time Calculation    Start Time 1535  -AE 0800  -MF     PT Received On 01/10/23  -AE --     PT Goal Re-Cert Due Date 01/17/23  -AE 01/17/23  -        Time Calculation- PT    Total Timed Code Minutes- PT 38 minute(s)  -AE --        Timed Charges    36174 - Gait Training Minutes  23  -AE --     31802 - PT Therapeutic Activity Minutes 15  -AE --        Untimed Charges    Wound Care -- 08654 Selective debridement  -     63090-Mbirrppor debridement -- 10  -     18379-Bmn Pressure wound to 50 sqcm -- 25  -MF        Total Minutes    Timed Charges Total Minutes 38  -AE --     Untimed Charges Total Minutes -- 35  -MF      Total Minutes 38  -AE 35  -MF           User Key  (r) = Recorded By, (t) = Taken By, (c) = Cosigned By    Initials Name Provider Type    Kevin Manjarrez, PT Physical Therapist    AE Brian Horton, SHARLA Physical  Therapist              Therapy Charges for Today     Code Description Service Date Service Provider Modifiers Qty    01378951905 HC PT THER PROC EA 15 MIN 1/9/2023 Brian Horton, PT GP 1    16528488926 HC GAIT TRAINING EA 15 MIN 1/9/2023 Brian Horton, PT GP 1    09787654696 HC PT THERAPEUTIC ACT EA 15 MIN 1/9/2023 Brian Horton, PT GP 1    47402907931 HC GAIT TRAINING EA 15 MIN 1/10/2023 Brian Horton, PT GP 2    [id number]  PT THERAPEUTIC ACT EA 15 MIN 1/10/2023 Brian Horton, PT GP 1          PT G-Codes  Outcome Measure Options: AM-PAC 6 Clicks Basic Mobility (PT)  AM-PAC 6 Clicks Score (PT): 18  AM-PAC 6 Clicks Score (OT): 15  PT Discharge Summary  Anticipated Discharge Disposition (PT): inpatient rehabilitation facility    Brian Horton PT  1/10/2023

## 2023-01-10 NOTE — PAYOR COMM NOTE
"Fatoumata Lynn RN  Utilization Management  P:[phone]  F:[phone]    [facility]# [id number]  Casa Nguyễn (62 y.o. Female)     Date of Birth   1960    Social Security Number       Address   [address] [place], [place] [address]    Home Phone   [phone]    MRN   [id number]       Spiritism   Religious    Marital Status                               Admission Date   1/5/23    Admission Type   Emergency    Admitting Provider   Isela Jasmine MD    Attending Provider   Isela Jasmine MD    Department, Room/Bed   [address], S551/1       Discharge Date       Discharge Disposition       Discharge Destination                               Attending Provider: Isela Jasmine MD    Allergies: Ciprofloxacin, Clindamycin/lincomycin, Doxycycline, Nsaids, Sulfa Antibiotics, Tetracyclines & Related    Isolation: None   Infection: MRSA (01/06/23)   Code Status: CPR    Ht: 167.6 cm (66\")   Wt: 116 kg (255 lb)    Admission Cmt: None   Principal Problem: Sepsis (HCC) [A41.9]                 Active Insurance as of 1/5/2023     Primary Coverage     Payor Plan Insurance Group Employer/Plan Group    [employer] PPO [id number]     Payor Plan Address Payor Plan Phone Number Payor Plan Fax Number Effective Dates    [address] [phone]  1/1/2023 - None Entered    [place] [address]       Subscriber Name Subscriber Birth Date Member ID       CASA NGUYỄN 1960 [id number]                 Emergency Contacts      (Rel.) Home Phone Work Phone Mobile Phone    Ivonne Gong (Daughter) -- -- [phone]    EdwardDereje (Spouse) -- -- [phone]              Current Facility-Administered Medications   Medication Dose Route Frequency Provider Last Rate Last Admin   • acetaminophen (TYLENOL) 160 MG/5ML solution 650 mg  650 mg Oral Q4H PRN Mike, Giuliana, APRN        Or   • acetaminophen (TYLENOL) suppository 650 " mg  650 mg Rectal Q4H PRN Mike, Giuliana, APRN       • acetaminophen (TYLENOL) tablet 650 mg  650 mg Oral Q4H PRN Javon Raygoza MD       • artificial tears ophthalmic ointment   Both Eyes Nightly Rishabh Russell PA-C   Given at 01/09/23 0862   • aspirin chewable tablet 81 mg  81 mg Oral Daily Mike, Giuliana, APRN   81 mg at 01/10/23 0931   • atorvastatin (LIPITOR) tablet 10 mg  10 mg Oral Daily Mike, Giuliana, APRN   10 mg at 01/10/23 0938   • betamethasone (augmented) (DIPROLENE) 0.05 % cream 1 application  1 application Topical BID PRN Isela Jasmine MD       • Calcium Carb-Cholecalciferol 600-20 MG-MCG tablet 1 tablet  1 tablet Oral Daily Mike, Giuliana, APRN   1 tablet at 01/10/23 0938   • cetirizine (zyrTEC) tablet 5 mg  5 mg Oral Daily Mike, Giuliana, APRN   5 mg at 01/10/23 0938   • dextrose (D50W) (25 g/50 mL) IV injection 25 g  25 g Intravenous Q15 Min PRN Mike, Giuliana, APRN       • dextrose (GLUTOSE) oral gel 15 g  15 g Oral Q15 Min PRN Mike, Giuliana, APRN       • folic acid (FOLVITE) tablet 0.5 mg  0.5 mg Oral Daily Mike, Giuliana, APRN   0.5 mg at 01/10/23 0931   • glucagon (human recombinant) (GLUCAGEN DIAGNOSTIC) injection 1 mg  1 mg Intramuscular Q15 Min PRN Mike, Giuliana, APRN       • heparin (porcine) 5000 UNIT/ML injection 5,000 Units  5,000 Units Subcutaneous Q12H Javon Raygoza MD   5,000 Units at 01/10/23 0931   • hydroCHLOROthiazide (HYDRODIURIL) tablet 50 mg  50 mg Oral Daily Mike, Giuliana, APRN   50 mg at 01/10/23 0931   • HYDROcodone-acetaminophen (NORCO) 7.5-325 MG per tablet 1 tablet  1 tablet Oral Q4H PRN Javon Raygoza MD       • HYDROmorphone (DILAUDID) injection 1 mg  1 mg Intravenous Q2H PRN Javon Raygoza MD        And   • naloxone (NARCAN) injection 0.4 mg  0.4 mg Intravenous Q5 Min PRN Javon Raygoza MD       • insulin detemir (LEVEMIR) injection 15 Units  15 Units Subcutaneous Nightly Araceli Fountain, JOEY   15 Units at 01/09/23 9829    • Insulin Lispro (humaLOG) injection 0-14 Units  0-14 Units Subcutaneous TID AC Isela Jasmine MD   10 Units at 01/10/23 0938   • Insulin Lispro (humaLOG) injection 3 Units  3 Units Subcutaneous TID With Meals MargaritaonAraceli Soco, APRN   3 Units at 01/10/23 0939   • morphine injection 1 mg  1 mg Intravenous Q4H PRN Javon Raygoza MD        And   • naloxone (NARCAN) injection 0.4 mg  0.4 mg Intravenous Q5 Min PRN Javon Raygoza MD       • nystatin (MYCOSTATIN) powder   Topical Q8H Tashi Mckeonatha, APRN   Given at 01/10/23 0510   • ondansetron (ZOFRAN) tablet 4 mg  4 mg Oral Q6H PRN Javon Raygoza MD        Or   • ondansetron (ZOFRAN) injection 4 mg  4 mg Intravenous Q6H PRN Javon Raygoza MD       • pantoprazole (PROTONIX) EC tablet 40 mg  40 mg Oral Q AM Pollo Torres PA-C   40 mg at 01/10/23 0510   • piperacillin-tazobactam (ZOSYN) 3.375 g in iso-osmotic dextrose 50 ml (premix)  3.375 g Intravenous Q8H Kevin Abdul MD   3.375 g at 01/10/23 0510   • potassium chloride (MICRO-K) CR capsule 40 mEq  40 mEq Oral PRN Jazmin Cotton MD   40 mEq at 01/07/23 0350    Or   • potassium chloride (KLOR-CON) packet 40 mEq  40 mEq Oral PRN Jazmin Cotton MD        Or   • potassium chloride 10 mEq in 100 mL IVPB  10 mEq Intravenous Q1H PRN Jazmin Cotton MD       • saccharomyces boulardii (FLORASTOR) capsule 250 mg  250 mg Oral BID Isela Jasmine MD   250 mg at 01/10/23 0938   • sodium chloride 0.45 % infusion  50 mL/hr Intravenous Continuous Javon Raygoza MD 50 mL/hr at 01/10/23 0218 50 mL/hr at 01/10/23 0218   • sodium chloride 0.9 % flush 10 mL  10 mL Intravenous Q12H Mike, Giuliana, APRN   10 mL at 01/09/23 0908   • sodium chloride 0.9 % flush 10 mL  10 mL Intravenous PRN Mike, Giuliana, APRN       • sodium chloride 0.9 % infusion 40 mL  40 mL Intravenous PRN Mike, Giuliana, APRN       • vitamin D3 capsule 5,000 Units  5,000 Units Oral Daily Mike, Giuliana, APRN    5,000 Units at 01/10/23 0937     Lab Results (last 72 hours)     Procedure Component Value Units Date/Time    POC Glucose Once [[id number]]  (Abnormal) Collected: 01/10/23 1113    Specimen: Blood Updated: 01/10/23 1115     Glucose 320 mg/dL      Comment: Meter: [id number] : [id number] [place] Inna       POC Glucose Once [[id number]]  (Abnormal) Collected: 01/10/23 0700    Specimen: Blood Updated: 01/10/23 0701     Glucose 347 mg/dL      Comment: Meter: [id number] : [id number] [facility]       Blood Culture - Blood, Arm, Left [[id number]]  (Normal) Collected: 01/05/23 2105    Specimen: Blood from Arm, Left Updated: 01/09/23 2131     Blood Culture No growth at 4 days    POC Glucose Once [[id number]]  (Abnormal) Collected: 01/09/23 2027    Specimen: Blood Updated: 01/09/23 2029     Glucose 307 mg/dL      Comment: Meter: [id number] : [id number] Wally Wright       Blood Culture - Blood, Arm, Left [[id number]]  (Normal) Collected: 01/05/23 1758    Specimen: Blood from Arm, Left Updated: 01/09/23 1815     Blood Culture No growth at 4 days    POC Glucose Once [[id number]]  (Abnormal) Collected: 01/09/23 1632    Specimen: Blood Updated: 01/09/23 1634     Glucose 335 mg/dL      Comment: Meter: [id number] : [id number] Marito Heatha       POC Glucose Once [[id number]]  (Abnormal) Collected: 01/09/23 1110    Specimen: Blood Updated: 01/09/23 1113     Glucose 366 mg/dL      Comment: Meter: [id number] : [id number] Marito Tyvonna       Tissue / Bone Culture - Tissue, Toe, Right [[id number]]  (Abnormal) Collected: 01/06/23 1832    Specimen: Tissue from Toe, Right Updated: 01/09/23 0914     Tissue Culture Rare Streptococcus mitis / oralis     Gram Stain No WBCs or organisms seen    Narrative:      Refer to culture collected 1/6/2023 1733 for MICs    Wound Culture - Wound, Toe, Right [[id number]]  (Abnormal)  (Susceptibility) Collected: 01/06/23 1733    Specimen: Wound from Toe, Right Updated: 01/09/23 0866      Wound Culture Scant growth (1+) Streptococcus mitis / oralis     Gram Stain No WBCs or organisms seen    Susceptibility      Streptococcus mitis / oralis      VASYL      Penicillin G Susceptible      Vancomycin Susceptible                           Anaerobic Culture - Wound, Toe, Right [[id number]]  (Normal) Collected: 01/06/23 1733    Specimen: Wound from Toe, Right Updated: 01/09/23 0747     Anaerobic Culture No anaerobes isolated at 3 days    Anaerobic Culture - Tissue, Toe, Right [[id number]]  (Normal) Collected: 01/06/23 1832    Specimen: Tissue from Toe, Right Updated: 01/09/23 0747     Anaerobic Culture No anaerobes isolated at 3 days    POC Glucose Once [[id number]]  (Abnormal) Collected: 01/09/23 0716    Specimen: Blood Updated: 01/09/23 0722     Glucose 384 mg/dL      Comment: Meter: [id number] : [id number] Marito Lantigua       Comprehensive Metabolic Panel [[id number]]  (Abnormal) Collected: 01/09/23 0256    Specimen: Blood Updated: 01/09/23 0413     Glucose 352 mg/dL      BUN 15 mg/dL      Creatinine 1.00 mg/dL      Sodium 137 mmol/L      Potassium 3.9 mmol/L      Chloride 99 mmol/L      CO2 28.0 mmol/L      Calcium 9.2 mg/dL      Total Protein 6.1 g/dL      Albumin 3.2 g/dL      ALT (SGPT) 43 U/L      AST (SGOT) 22 U/L      Alkaline Phosphatase 98 U/L      Total Bilirubin 0.6 mg/dL      Globulin 2.9 gm/dL      Comment: Calculated Result        A/G Ratio 1.1 g/dL      BUN/Creatinine Ratio 15.0     Anion Gap 10.0 mmol/L      eGFR 64.2 mL/min/1.73      Comment: National Kidney Foundation and American Society of Nephrology (ASN) Task Force recommended calculation based on the Chronic Kidney Disease Epidemiology Collaboration (CKD-EPI) equation refit without adjustment for race.       Narrative:      GFR Normal >60  Chronic Kidney Disease <60  Kidney Failure <15      CBC (No Diff) [[id number]]  (Abnormal) Collected: 01/09/23 0256    Specimen: Blood Updated: 01/09/23 0327     WBC 8.53 10*3/mm3      RBC  3.59 10*6/mm3      Hemoglobin 11.4 g/dL      Hematocrit 34.7 %      MCV 96.7 fL      MCH 31.8 pg      MCHC 32.9 g/dL      RDW 13.1 %      RDW-SD 45.7 fl      MPV 9.7 fL      Platelets 313 10*3/mm3     POC Glucose Once [[id number]]  (Abnormal) Collected: 01/08/23 1625    Specimen: Blood Updated: 01/08/23 1626     Glucose 294 mg/dL      Comment: Meter: [id number] : [id number] Anita Russell       POC Glucose Once [[id number]]  (Abnormal) Collected: 01/08/23 1137    Specimen: Blood Updated: 01/08/23 1139     Glucose 326 mg/dL      Comment: Meter: [id number] : [id number] Anita Russell       Wound Culture - Swab, Foot, Right [[id number]] Collected: 01/05/23 2201    Specimen: Swab from Foot, Right Updated: 01/08/23 0730     Wound Culture Moderate growth (3+) Normal Skin Kayli     Gram Stain No WBCs or organisms seen    POC Glucose Once [[id number]]  (Abnormal) Collected: 01/08/23 0715    Specimen: Blood Updated: 01/08/23 0716     Glucose 263 mg/dL      Comment: Meter: [id number] : [id number] Marito Tymelinaa       POC Glucose Once [[id number]]  (Abnormal) Collected: 01/07/23 1950    Specimen: Blood Updated: 01/07/23 1951     Glucose 270 mg/dL      Comment: Meter: [id number] : [id number] Jd Deanita       POC Glucose Once [[id number]]  (Abnormal) Collected: 01/07/23 1623    Specimen: Blood Updated: 01/07/23 1624     Glucose 315 mg/dL      Comment: Meter: [id number] : [id number] [place] Tyvonna                    Physician Progress Notes (last 72 hours)      Kevin Abdul MD at 01/10/23 0742          Maura Leon  1960  [id number]     Chief Complaint:  Right foot infection    Reason for Consultation: right foot infection    History of present illness:     Patient is a 62 y.o.  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   "Follows with podiatry in [place]; she has diabetes with chronic sensory neuropathy and has not noticed any recent exposures.  No specific blunt force or penetrating trauma.  She has not stepped on anything she knows of.  No animal insect or arthropod bite.  No fresh/brackish/salt water exposure.  No prior history MRSA VRE C. difficile or ESBL/KP C/CRE organisms.    She is admitted on January 5, 2023 with acute deterioration at the right foot.  She had just noticed a wound on the plantar side of her foot when she had spontaneous drainage and does not know how long it had been there.  She has no pain because of her sensory neuropathy.  She developed acute redness/swelling and was told to come to the emergency room by her podiatrist.  She was noted to have fever/leukocytosis with elevated procalcitonin, sepsis per admission H&P and abnormal MRI with plantar foot wound/ulceration over the second MTP joint with adjacent soft tissue and inflammatory phlegmon but no focal fluid collection per radiology and no osteomyelitis per radiology.  Initial x-ray did raise concern for gas in the soft tissue per radiology.  Dr. Raygoza has seen the patient and he is planning for surgical debridement on January 6 1/6/23 surgery by Dr Raygoza:  \"Pre-op Diagnosis: Gas gangrene of the right foot involving the plantar surface of the right second toe metatarsal head area  Post-op Diagnosis:   Same with extensive necrotizing fasciitis of the right second toe involving the flexor and extensor tendons.\"    \"Procedure(s): Right second toe transmetatarsal amputation through the distal third of the second metatarsal bone with extensive sharp soft tissue debridement.  Wound left open to heal by secondary intent/wound VAC therapy\"    1/6 MRSA surveillance culture positive    1/10/23 physical therapy reports that VAC change to occur today.operative cultures from foot with S Mitis, anaerobic culture pending.    No fever;  postop no pain at the " "right foot with her sensory neuropathy.  She has redness/swelling  Generally improving since surgery.  She denies any other specific focal complaints    No headache photophobia or neck stiffness.  No shortness of breath cough or hemoptysis.  No nausea vomiting diarrhea or abdominal pain.  No dysuria hematuria or pyuria.  No skin rash    Review of Systems    1/10/23    Constitutional-- No  chills or sweats.  Appetite better  Heent-- No new vision, hearing or throat complaints.  No epistaxis or oral sores.  Denies odynophagia or dysphagia.  No flashers, floaters or eye pain. No odynophagia or dysphagia. No headache, photophobia or neck stiffness.  CV-- No chest pain, palpitation or syncope  Resp-- No SOB/cough/Hemoptysis  GI- No nausea, vomiting, or diarrhea.  No hematochezia, melena, or hematemesis. Denies jaundice or chronic liver disease.  -- No dysuria, hematuria, or flank pain.  Denies hesitancy, urgency or flank pain.  Lymph- no swollen lymph nodes in neck/axilla or groin.   Heme- No active bruising or bleeding; no Hx of DVT or PE.  MS-- no acute swelling or pain in the bones or joints of arms/legs aside from above.  No new back pain.  Neuro-- No acute focal weakness or numbness in the arms or legs.  No seizures.    Full 12 point review of systems reviewed and negative otherwise for acute complaints, except for above    Physical Exam: Nursing/chaperone present  Vital Signs   /66 (BP Location: Left arm, Patient Position: Lying)   Pulse 73   Temp 97.1 °F (36.2 °C) (Oral)   Resp 21   Ht 167.6 cm (66\")   Wt 116 kg (255 lb)   SpO2 98%   BMI 41.16 kg/m²     GENERAL: Awake and alert, in no acute distress.   HEENT: Normocephalic, atraumatic.   . No conjunctival injection. No icterus. Oropharynx clear without evidence of thrush or exudate. No evidence of peridontal disease.    NECK: Supple without nuchal rigidity. No mass.   HEART: RRR; No murmur, rubs, gallops.   LUNGS: Clear to auscultation bilaterally " without wheezing, rales, rhonchi. Normal respiratory effort. Nonlabored. No dullness.  ABDOMEN: Soft, nontender, nondistended. Positive bowel sounds. No rebound or guarding. NO mass or HSM.  EXT:  No cyanosis, clubbing . No cord.  See below   MSK: FROM without joint effusions noted arms/legs.    SKIN: Warm and dry without cutaneous eruptions on Inspection/palpation.  See below  NEURO: Oriented to PPT. No focal deficits on motor/sensory exam at arms/legs.    Right foot surgical site noted with VAC;  Redness at foot no progression.  No discrete mass bulge or fluctuance.  No crepitus or bulla.  Partial right hallux amputation noted with healed surgical site.  Onychomycosis noted with thickened nails; no new purulence    No peripheral stigmata/phenomena of endocarditis    IV without obvious redness or drainage    Laboratory Data    Results from last 7 days   Lab Units 01/09/23  0256 01/07/23  0353 01/06/23  0411   WBC 10*3/mm3 8.53 13.24* 16.19*   HEMOGLOBIN g/dL 11.4* 11.7* 11.5*   HEMATOCRIT % 34.7 36.0 34.2   PLATELETS 10*3/mm3 313 275 287     Results from last 7 days   Lab Units 01/09/23  0256   SODIUM mmol/L 137   POTASSIUM mmol/L 3.9   CHLORIDE mmol/L 99   CO2 mmol/L 28.0   BUN mg/dL 15   CREATININE mg/dL 1.00   GLUCOSE mg/dL 352*   CALCIUM mg/dL 9.2     Results from last 7 days   Lab Units 01/09/23  0256   ALK PHOS U/L 98   BILIRUBIN mg/dL 0.6   ALT (SGPT) U/L 43*   AST (SGOT) U/L 22     Results from last 7 days   Lab Units 01/05/23  1755   SED RATE mm/hr 74*     Results from last 7 days   Lab Units 01/05/23  1755   CRP mg/dL 12.67*       Estimated Creatinine Clearance: 76.5 mL/min (by C-G formula based on SCr of 1 mg/dL).      Microbiology:      Radiology:  Imaging Results (Last 72 Hours)     ** No results found for the last 72 hours. **            Impression:     --Acute sepsis present on admission per medicine notes, in the setting of chronic immunosuppression/psoriatic arthritis and acute deterioration in her  right foot with right foot infection most likely source.  Significant leukocytosis with abnormal procalcitonin and abnormal imaging.     Timing/option of threshold including extent for any further debridement/amputation at discretion of Dr. Raygoza; surgery 1/6 as above    --acute /severe right foot infection with necrotizing fasciitis at surgery as above    **Cx with S Mitis so far;  Anaerobic culture pending    --MRSA surveillance culture positivity; no MRSA at her foot per microbiology    -- Psoriatic arthritis with chronic methotrexate, currently on hold by medicine team.    -- Diabetes with chronic sensory neuropathy.  She understands the importance of protecting her feet.  You need to tightly control blood sugar to give best chance for healing    -- Antibiotic allergies as above to clindamycin, doxycycline, sulfa and fluoroquinolones      PLAN:      -- IV  Zosyn for now.  Depending on further culture data and clinical course, potentially taper further in upcoming days    -- Check/review labs cultures and scans    -- Partial history per nursing staff    -- Discussed with microbiology    -- Highly complex set of issues with high risk for further serious morbidity and other serious sequela    -- Discussed with Dr. Raygoza.  VAC at present      **pathology pending    Copied text in this note has been reviewed and is accurate as of 01/10/23.        Kevin Abdul MD  1/10/2023                Electronically signed by Kevin Abdul MD at 01/10/23 9144     Javon Raygoza MD at 01/10/23 0524          Cardiothoracic Surgery Progress Note      POD #: 4-transmetatarsal amputation right second toe with extensive soft tissue debridement wound left open to heal with secondary intent wound VAC     LOS: 5 days      Subjective: Awake this morning cheerful.    Objective: T-max today 99.1 °F  Vital SignsTemp:  [97.1 °F (36.2 °C)-99.1 °F (37.3 °C)] 97.1 °F (36.2 °C)  Heart Rate:  [] 73  Resp:  [18-21] 21  BP:  (105-121)/(66-90) 116/66    Physical Exam:   General Appearance: Awake alert and oriented   Lungs:   Heart:   Skin:   Incision: Amputation site has wound VAC in place and dry dressing.  No drainage     Results:  Results from last 7 days   Lab Units 23  0256   WBC 10*3/mm3 8.53   HEMOGLOBIN g/dL 11.4*   HEMATOCRIT % 34.7   PLATELETS 10*3/mm3 313     Results from last 7 days   Lab Units 23  0256   SODIUM mmol/L 137   POTASSIUM mmol/L 3.9   CHLORIDE mmol/L 99   CO2 mmol/L 28.0   BUN mg/dL 15   CREATININE mg/dL 1.00   GLUCOSE mg/dL 352*   CALCIUM mg/dL 9.2         Assessment: #1.  Postop day 4 right second toe transmetatarsal amputation wound left open to heal by second intent wound VAC  2 diabetes mellitus and diabetic neuropathy with  plantar surface ulceration metatarsal head area        Plan: Wound VAC management PT wound care.  Medical manage per hospitalist.  Antibiotics infectious disease.      Javon Raygoza MD - 01/10/23 - 05:30 EST        Electronically signed by Javon Raygoza MD at 01/10/23 0531     Araceli Fountain APRN at 23 1050              [facility] Medicine Services  PROGRESS NOTE    Patient Name: Maura Leon  : 1960  MRN: [id number]    Date of Admission: 2023  Primary Care Physician: Emanuel Fuentes DPM    Subjective   Subjective     CC:  Diabetic ulcer    HPI:  Patient seen resting back in bed sleeping.  CPAP in place.  Awakens to voice.  Patient feels great.  Denies chest pain and states that her bilateral lower extremity feet are not at baseline.  Right foot with wound VAC in place and dressing dry and intact.  Discussed continued elevated glucoses and will adjust insulins today, patient agrees.    ROS:  Gen- No fevers, chills  CV- No chest pain, palpitations  Resp- No cough, dyspnea  GI- No N/V/D, abd pain    Objective   Objective     Vital Signs:   Temp:  [96.9 °F (36.1 °C)-98.6 °F (37 °C)] 96.9 °F (36.1 °C)  Heart Rate:  []  81  Resp:  [18-21] 18  BP: (105-127)/(56-71) 121/71     Physical Exam:  Constitutional: No acute distress, sleeping soundly back in bed with CPAP in place.  Awakens easily to voice.  Respiratory: Clear to auscultation bilaterally but decreased, respiratory effort normal on room air with sats 99%.  Cardiovascular: RRR, no murmurs, rubs, or gallops  Gastrointestinal: Positive bowel sounds, soft, nontender, nondistended.  Morbidly obese.  Musculoskeletal: No LLE ankle edema, dressing and wound VAC in place on right foot dry and intact.  [place] spontaneously.  Psychiatric: Appropriate affect, cooperative  Neurologic: Oriented x 3, strength symmetric in all extremities, Cranial Nerves grossly intact to confrontation, speech clear and appropriate.  Follows commands.  Skin: No warm and dry.        Results Reviewed:  LAB RESULTS:      Lab 01/09/23 0256 01/07/23 0353 01/06/23 0411 01/05/23  1755   WBC 8.53 13.24* 16.19* 20.01*   HEMOGLOBIN 11.4* 11.7* 11.5* 13.1   HEMATOCRIT 34.7 36.0 34.2 39.5   PLATELETS 313 275 287 367   NEUTROS ABS  --  11.18* 13.72* 17.97*   IMMATURE GRANS (ABS)  --  0.08* 0.12* 0.08*   LYMPHS ABS  --  0.84 1.05 0.90   MONOS ABS  --  0.96* 1.20* 0.97*   EOS ABS  --  0.12 0.03 0.01   MCV 96.7 97.0 94.0 94.5   SED RATE  --   --   --  74*   CRP  --   --   --  12.67*   PROCALCITONIN  --   --   --  0.35*   LACTATE  --   --   --  1.9         Lab 01/09/23  0256 01/07/23  0353 01/06/23  0411 01/05/23  1755   SODIUM 137 135* 137 135*   POTASSIUM 3.9 3.9 3.1* 3.6   CHLORIDE 99 102 99 95*   CO2 28.0 19.0* 27.0 27.0   ANION GAP 10.0 14.0 11.0 13.0   BUN 15 16 14 16   CREATININE 1.00 0.96 0.77 0.94   EGFR 64.2 67.5 87.9 69.2   GLUCOSE 352* 211* 189* 364*   CALCIUM 9.2 8.8 9.3 9.7   MAGNESIUM  --   --  1.9  --    HEMOGLOBIN A1C  --   --  8.20*  --          Lab 01/09/23  0256 01/05/23  1755   TOTAL PROTEIN 6.1 8.3   ALBUMIN 3.2* 4.3   GLOBULIN 2.9 4.0   ALT (SGPT) 43* 44*   AST (SGOT) 22 25   BILIRUBIN 0.6 1.2   ALK  PHOS 98 113                 Lab 01/06/23  1155   ABO TYPING O   RH TYPING Positive   ANTIBODY SCREEN Negative         Brief Urine Lab Results     None          Microbiology Results Abnormal     Procedure Component Value - Date/Time    Anaerobic Culture - Wound, Toe, Right [[id number]]  (Normal) Collected: 01/06/23 1733    Lab Status: Preliminary result Specimen: Wound from Toe, Right Updated: 01/09/23 0747     Anaerobic Culture No anaerobes isolated at 3 days    Anaerobic Culture - Tissue, Toe, Right [[id number]]  (Normal) Collected: 01/06/23 1832    Lab Status: Preliminary result Specimen: Tissue from Toe, Right Updated: 01/09/23 0747     Anaerobic Culture No anaerobes isolated at 3 days    Blood Culture - Blood, Arm, Left [[id number]]  (Normal) Collected: 01/05/23 2105    Lab Status: Preliminary result Specimen: Blood from Arm, Left Updated: 01/08/23 2133     Blood Culture No growth at 3 days    Blood Culture - Blood, Arm, Left [[id number]]  (Normal) Collected: 01/05/23 1758    Lab Status: Preliminary result Specimen: Blood from Arm, Left Updated: 01/08/23 1815     Blood Culture No growth at 3 days    Wound Culture - Swab, Foot, Right [[id number]] Collected: 01/05/23 2201    Lab Status: Final result Specimen: Swab from Foot, Right Updated: 01/08/23 0730     Wound Culture Moderate growth (3+) Normal Skin Kayli     Gram Stain No WBCs or organisms seen          No radiology results from the last 24 hrs        I have reviewed the medications:  Scheduled Meds:aspirin, 81 mg, Oral, Daily  atorvastatin, 10 mg, Oral, Daily  betamethasone (augmented), 1 application, Topical, BID  Calcium Carb-Cholecalciferol, 1 tablet, Oral, Daily  cetirizine, 5 mg, Oral, Daily  DAPTOmycin, 6 mg/kg (Adjusted), Intravenous, Q24H  folic acid, 0.5 mg, Oral, Daily  heparin (porcine), 5,000 Units, Subcutaneous, Q12H  hydroCHLOROthiazide, 50 mg, Oral, Daily  insulin detemir, 15 Units, Subcutaneous, Nightly  insulin lispro, 0-14 Units, Subcutaneous,  TID AC  Insulin Lispro, 3 Units, Subcutaneous, TID With Meals  nystatin, , Topical, Q8H  pantoprazole, 40 mg, Oral, Q AM  piperacillin-tazobactam, 3.375 g, Intravenous, Q8H  saccharomyces boulardii, 250 mg, Oral, BID  sodium chloride, 10 mL, Intravenous, Q12H  vitamin D3, 5,000 Units, Oral, Daily      Continuous Infusions:sodium chloride, 50 mL/hr, Last Rate: 50 mL/hr (01/09/23 0910)      PRN Meds:.•  [DISCONTINUED] acetaminophen **OR** acetaminophen **OR** acetaminophen  •  acetaminophen  •  dextrose  •  dextrose  •  glucagon (human recombinant)  •  HYDROcodone-acetaminophen  •  HYDROmorphone **AND** naloxone  •  Morphine **AND** naloxone  •  ondansetron **OR** ondansetron  •  potassium chloride **OR** potassium chloride **OR** potassium chloride  •  sodium chloride  •  sodium chloride    Assessment & Plan   Assessment & Plan     Active Hospital Problems    Diagnosis  POA   • **Sepsis ([facility]) [A41.9]  Yes   • Ulcer of right foot ([facility]) [L97.519]  Yes   • Type 2 diabetes mellitus ([facility]) [E11.9]  Yes   • Psoriatic arthritis ([facility]) [L40.50]  Yes   • Immunocompromised ([facility]) [D84.9]  Yes   • GERD (gastroesophageal reflux disease) [K21.9]  Yes   • Cellulitis of right foot [L03.115]  Unknown      Resolved Hospital Problems   No resolved problems to display.        Brief Hospital Course to date:  62 year old female with history of type 2 diabetes, psoriatic arthritis on immunosuppressive medications who presents with a right foot ulcer.  Patient was noted to have a leukocytosis of 20,000, and elevated procalcitonin and elevated CRP.  X-ray of right foot showed soft tissue swelling throughout the forefoot and midfoot jesting cellulitis, suggestion of gas production the soft tissues of the first digit and extending towards the second digit. MRI of the right foot no abscess or osteomyelitis.      This patient's problems and plans were partially entered by my partner and updated as appropriate by me  01/09/23.    Assessment/plan:  Patient is new to me today     Sepsis secondary to right foot ulcer with cellulitis in the setting of type 2 diabetes, Immunocompromised state  -Continue dapto and zosyn.  -MRSA +  -Wound culture with alpha strep  -S/p R 2nd toe transmetatarsal amputation with Dr. Raygoza on 1/6/2023  --Wound VAC in place.  --Holding methotrexate  -ID and surg following    Diabetes mellitus type 2  -Hemoglobin A1c noted to be 8.2  -Continue sliding scale insulin, currently on levemir 10u qhs and MD SSI.  We will increase to 15 units nightly and add 3 units 3 times daily with meals with hold parameters.  Continue to monitor and adjust.    Acute anemia  -H/H stable, monitor for any bleeding.     Psoriatic arthritis   -on methotrexate: hold for now      GERD  -PPI      Hyperlipidemia  -continue statin      DVT prophylaxis:  scds to LLE      Expected Discharge Location and Transportation: home vs rehab  Expected Discharge   Expected Discharge Date and Time     Expected Discharge Date Expected Discharge Time    Jan 13, 2023            DVT prophylaxis:  Medical and mechanical DVT prophylaxis orders are present.     AM-PAC 6 Clicks Score (PT): 18 (01/08/23 2000)    CODE STATUS:   Code Status and Medical Interventions:   Ordered at: 01/05/23 4012     Level Of Support Discussed With:    Patient     Code Status (Patient has no pulse and is not breathing):    CPR (Attempt to Resuscitate)     Medical Interventions (Patient has pulse or is breathing):    Full Support       JOEY Light  01/09/23                Electronically signed by Araceli Fountain APRN at 01/09/23 1312     Kevin Abdul MD at 01/09/23 0804          Maura Leon  1960  [id number]     Chief Complaint:  Right foot infection    Reason for Consultation: right foot infection    History of present illness:     Patient is a 62 y.o.  Yr old female with history of psoriatic arthritis on chronic methotrexate, prior  "right partial hallux amputation associated with group B strep per patient, surgery done in [place] and other specifics of care unclear.  Follows with podiatry in [place]; she has diabetes with chronic sensory neuropathy and has not noticed any recent exposures.  No specific blunt force or penetrating trauma.  She has not stepped on anything she knows of.  No animal insect or arthropod bite.  No fresh/brackish/salt water exposure.  No prior history MRSA VRE C. difficile or ESBL/KP C/CRE organisms.    She is admitted on January 5, 2023 with acute deterioration at the right foot.  She had just noticed a wound on the plantar side of her foot when she had spontaneous drainage and does not know how long it had been there.  She has no pain because of her sensory neuropathy.  She developed acute redness/swelling and was told to come to the emergency room by her podiatrist.  She was noted to have fever/leukocytosis with elevated procalcitonin, sepsis per admission H&P and abnormal MRI with plantar foot wound/ulceration over the second MTP joint with adjacent soft tissue and inflammatory phlegmon but no focal fluid collection per radiology and no osteomyelitis per radiology.  Initial x-ray did raise concern for gas in the soft tissue per radiology.  Dr. Raygoza has seen the patient and he is planning for surgical debridement on January 6 1/6/23 surgery by Dr Raygoza:  \"Pre-op Diagnosis: Gas gangrene of the right foot involving the plantar surface of the right second toe metatarsal head area  Post-op Diagnosis:   Same with extensive necrotizing fasciitis of the right second toe involving the flexor and extensor tendons.\"    \"Procedure(s): Right second toe transmetatarsal amputation through the distal third of the second metatarsal bone with extensive sharp soft tissue debridement.  Wound left open to heal by secondary intent/wound VAC therapy\"    1/6 MRSA surveillance culture positive    1/9/23 operative cultures " "from foot with alpha strep so far prelim.  Constipated; No fever;  postop no pain at the right foot with her sensory neuropathy.  She has redness/swelling unclear duration exactly but evolving over days.  She denies any other specific focal complaints    No headache photophobia or neck stiffness.  No shortness of breath cough or hemoptysis.  No nausea vomiting diarrhea or abdominal pain.  No dysuria hematuria or pyuria.  No skin rash    Review of Systems    1/9/23    Constitutional-- No  chills or sweats.  Appetite diminished with fatigue.  Heent-- No new vision, hearing or throat complaints.  No epistaxis or oral sores.  Denies odynophagia or dysphagia.  No flashers, floaters or eye pain. No odynophagia or dysphagia. No headache, photophobia or neck stiffness.  CV-- No chest pain, palpitation or syncope  Resp-- No SOB/cough/Hemoptysis  GI- No nausea, vomiting, or diarrhea.  No hematochezia, melena, or hematemesis. Denies jaundice or chronic liver disease.  -- No dysuria, hematuria, or flank pain.  Denies hesitancy, urgency or flank pain.  Lymph- no swollen lymph nodes in neck/axilla or groin.   Heme- No active bruising or bleeding; no Hx of DVT or PE.  MS-- no acute swelling or pain in the bones or joints of arms/legs aside from above.  No new back pain.  Neuro-- No acute focal weakness or numbness in the arms or legs.  No seizures.    Full 12 point review of systems reviewed and negative otherwise for acute complaints, except for above    Physical Exam: Nursing/chaperone present  Vital Signs   /70 (BP Location: Left arm, Patient Position: Lying)   Pulse 74   Temp 96.9 °F (36.1 °C) (Axillary)   Resp 20   Ht 167.6 cm (66\")   Wt 116 kg (255 lb)   SpO2 99%   BMI 41.16 kg/m²     GENERAL: Awake and alert, in no acute distress.   HEENT: Normocephalic, atraumatic.   . No conjunctival injection. No icterus. Oropharynx clear without evidence of thrush or exudate. No evidence of peridontal disease.    NECK: " Supple without nuchal rigidity. No mass.  LYMPH: No cervical, axillary or inguinal lymphadenopathy.  HEART: RRR; No murmur, rubs, gallops.   LUNGS: Clear to auscultation bilaterally without wheezing, rales, rhonchi. Normal respiratory effort. Nonlabored. No dullness.  ABDOMEN: Soft, nontender, nondistended. Positive bowel sounds. No rebound or guarding. NO mass or HSM.  EXT:  No cyanosis, clubbing . No cord.  See below   MSK: FROM without joint effusions noted arms/legs.    SKIN: Warm and dry without cutaneous eruptions on Inspection/palpation.  See below  NEURO: Oriented to PPT. No focal deficits on motor/sensory exam at arms/legs.    Right foot surgical site noted with VAC;  Redness at foot no progression.  No discrete mass bulge or fluctuance.  No crepitus or bulla.  Partial right hallux amputation noted with healed surgical site.  Onychomycosis noted with thickened nails; no new purulence    No peripheral stigmata/phenomena of endocarditis    IV without obvious redness or drainage    Laboratory Data    Results from last 7 days   Lab Units 01/09/23  0256 01/07/23  0353 01/06/23  0411   WBC 10*3/mm3 8.53 13.24* 16.19*   HEMOGLOBIN g/dL 11.4* 11.7* 11.5*   HEMATOCRIT % 34.7 36.0 34.2   PLATELETS 10*3/mm3 313 275 287     Results from last 7 days   Lab Units 01/09/23  0256   SODIUM mmol/L 137   POTASSIUM mmol/L 3.9   CHLORIDE mmol/L 99   CO2 mmol/L 28.0   BUN mg/dL 15   CREATININE mg/dL 1.00   GLUCOSE mg/dL 352*   CALCIUM mg/dL 9.2     Results from last 7 days   Lab Units 01/09/23  0256   ALK PHOS U/L 98   BILIRUBIN mg/dL 0.6   ALT (SGPT) U/L 43*   AST (SGOT) U/L 22     Results from last 7 days   Lab Units 01/05/23  1755   SED RATE mm/hr 74*     Results from last 7 days   Lab Units 01/05/23  1755   CRP mg/dL 12.67*       Estimated Creatinine Clearance: 76.5 mL/min (by C-G formula based on SCr of 1 mg/dL).      Microbiology:      Radiology:  Imaging Results (Last 72 Hours)     ** No results found for the last 72  hours. **            Impression:     --Acute sepsis present on admission per medicine notes, in the setting of chronic immunosuppression/psoriatic arthritis and acute deterioration in her right foot with right foot infection most likely source.  Significant leukocytosis with abnormal procalcitonin and abnormal imaging.     Timing/option of threshold including extent for any further debridement/amputation at discretion of Dr. Raygoza; surgery 1/6 as above    --acute /severe right foot infection with necrotizing fasciitis at surgery as above    **Cx with alpha strep so far, prelim    --MRSA surveillance culture positivity    -- Psoriatic arthritis with chronic methotrexate, currently on hold by medicine team.    -- Diabetes with chronic sensory neuropathy.  She understands the importance of protecting her feet.  You need to tightly control blood sugar to give best chance for healing    -- Antibiotic allergies as above to clindamycin, doxycycline, sulfa and fluoroquinolones      PLAN:      -- IV daptomycin/Zosyn; anticipate taper depending on further culture results    -- Check/review labs cultures and scans    -- Partial history per nursing staff    -- Discussed with microbiology    -- Highly complex set of issues with high risk for further serious morbidity and other serious sequela    -- Discussed with Dr. Raygoza.  VAC at present      **Cx data pending as above    Copied text in this note has been reviewed and is accurate as of 01/09/23.        Kevin Abdul MD  1/9/2023                Electronically signed by Kevin Abdul MD at 01/09/23 0805     Javon Raygoza MD at 01/09/23 0537          Cardiothoracic Surgery Progress Note      POD #: 3-transmetatarsal amputation second toe right foot with extensive soft tissue debridement wound left open to heal by secondary intent wound VAC     LOS: 4 days      Subjective: Asleep this morning.  Resting soundly.  Did not awaken her.    Objective:  Vital Signs stable  with T-max 98.6 °F  Temp:  [96.9 °F (36.1 °C)-98.6 °F (37 °C)] 96.9 °F (36.1 °C)  Heart Rate:  [69-97] 74  Resp:  [18-21] 20  BP: (105-127)/(56-70) 118/70    Physical Exam:   General Appearance: Asleep resting comfortably vital signs normal on monitor   Lungs:   Heart:   Skin:   Incision: Amputation site has wound VAC in place.  And dry dressing.     Results:  Results from last 7 days   Lab Units 23  0256   WBC 10*3/mm3 8.53   HEMOGLOBIN g/dL 11.4*   HEMATOCRIT % 34.7   PLATELETS 10*3/mm3 313     Results from last 7 days   Lab Units 23  0256   SODIUM mmol/L 137   POTASSIUM mmol/L 3.9   CHLORIDE mmol/L 99   CO2 mmol/L 28.0   BUN mg/dL 15   CREATININE mg/dL 1.00   GLUCOSE mg/dL 352*   CALCIUM mg/dL 9.2   Wound culture growing scant growth 1+ Streptococcus alpha hemolytic      Assessment: #1 postop day 3 right second toe transmetatarsal amputation wound left open to heal by secondary intent  #2.  Diabetes mellitus with diabetic neuropathy with plantar surface ulceration of the metatarsal area and gas gangrene in the deep soft tissues    Plan: Wound VAC management PT wound care.  Medical manage per hospitalist.  Antibiotics infectious disease.      Javon Raygoza MD - 23 - 05:41 EST        Electronically signed by Javon Raygoza MD at 23 0543     Isela Jasmine MD at 23 1428              [facility] Medicine Services  PROGRESS NOTE    Patient Name: Maura Leon  : 1960  MRN: [id number]    Date of Admission: 2023  Primary Care Physician: Emanuel Fuentes DPM    Subjective   Subjective     CC:  Diabetic ulcer    HPI:  Patient is overall doing well.  Denies any pain since she cannot feel her feet.  Tolerating her meals.      ROS:  Gen- No fevers, chills  CV- No chest pain, palpitations  Resp- No cough, dyspnea  GI- No N/V/D, abd pain        Objective   Objective     Vital Signs:   Temp:  [97.9 °F (36.6 °C)-99.6 °F (37.6 °C)] 97.9 °F (36.6 °C)  Heart  Rate:  [73-95] 73  Resp:  [16-18] 18  BP: ()/(57-65) 98/57     Physical Exam:  Constitutional: No acute distress, awake, alert, resting in bed.  Respiratory: Clear to auscultation bilaterally, respiratory effort normal on room air  Cardiovascular: RRR, no murmurs, rubs, or gallops  Gastrointestinal: Positive bowel sounds, soft, nontender, nondistended  Musculoskeletal: No bilateral ankle edema, wound VAC in place on right foot  Psychiatric: Appropriate affect, cooperative  Neurologic: Oriented x 3, strength symmetric in all extremities, Cranial Nerves grossly intact to confrontation, speech clear        Results Reviewed:  LAB RESULTS:      Lab 01/07/23  0353 01/06/23  0411 01/05/23  1755   WBC 13.24* 16.19* 20.01*   HEMOGLOBIN 11.7* 11.5* 13.1   HEMATOCRIT 36.0 34.2 39.5   PLATELETS 275 287 367   NEUTROS ABS 11.18* 13.72* 17.97*   IMMATURE GRANS (ABS) 0.08* 0.12* 0.08*   LYMPHS ABS 0.84 1.05 0.90   MONOS ABS 0.96* 1.20* 0.97*   EOS ABS 0.12 0.03 0.01   MCV 97.0 94.0 94.5   SED RATE  --   --  74*   CRP  --   --  12.67*   PROCALCITONIN  --   --  0.35*   LACTATE  --   --  1.9         Lab 01/07/23  0353 01/06/23  0411 01/05/23  1755   SODIUM 135* 137 135*   POTASSIUM 3.9 3.1* 3.6   CHLORIDE 102 99 95*   CO2 19.0* 27.0 27.0   ANION GAP 14.0 11.0 13.0   BUN 16 14 16   CREATININE 0.96 0.77 0.94   EGFR 67.5 87.9 69.2   GLUCOSE 211* 189* 364*   CALCIUM 8.8 9.3 9.7   MAGNESIUM  --  1.9  --    HEMOGLOBIN A1C  --  8.20*  --          Lab 01/05/23  1755   TOTAL PROTEIN 8.3   ALBUMIN 4.3   GLOBULIN 4.0   ALT (SGPT) 44*   AST (SGOT) 25   BILIRUBIN 1.2   ALK PHOS 113                 Lab 01/06/23  1155   ABO TYPING O   RH TYPING Positive   ANTIBODY SCREEN Negative         Brief Urine Lab Results     None          Microbiology Results Abnormal     Procedure Component Value - Date/Time    Tissue / Bone Culture - Tissue, Toe, Right [[id number]] Collected: 01/06/23 1832    Lab Status: Preliminary result Specimen: Tissue from Toe,  Right Updated: 01/08/23 0747     Tissue Culture No growth at 2 days     Gram Stain No WBCs or organisms seen    Wound Culture - Swab, Foot, Right [[id number]] Collected: 01/05/23 2201    Lab Status: Final result Specimen: Swab from Foot, Right Updated: 01/08/23 0730     Wound Culture Moderate growth (3+) Normal Skin Kayli     Gram Stain No WBCs or organisms seen    Blood Culture - Blood, Arm, Left [[id number]]  (Normal) Collected: 01/05/23 2105    Lab Status: Preliminary result Specimen: Blood from Arm, Left Updated: 01/07/23 2131     Blood Culture No growth at 2 days    Blood Culture - Blood, Arm, Left [[id number]]  (Normal) Collected: 01/05/23 1758    Lab Status: Preliminary result Specimen: Blood from Arm, Left Updated: 01/07/23 1815     Blood Culture No growth at 2 days          No radiology results from the last 24 hrs        I have reviewed the medications:  Scheduled Meds:aspirin, 81 mg, Oral, Daily  atorvastatin, 10 mg, Oral, Daily  betamethasone (augmented), 1 application, Topical, BID  Calcium Carb-Cholecalciferol, 1 tablet, Oral, Daily  cetirizine, 5 mg, Oral, Daily  DAPTOmycin, 6 mg/kg (Adjusted), Intravenous, Q24H  folic acid, 0.5 mg, Oral, Daily  heparin (porcine), 5,000 Units, Subcutaneous, Q12H  hydroCHLOROthiazide, 50 mg, Oral, Daily  insulin lispro, 0-14 Units, Subcutaneous, TID AC  nystatin, , Topical, Q8H  pantoprazole, 40 mg, Oral, Q AM  piperacillin-tazobactam, 3.375 g, Intravenous, Q8H  saccharomyces boulardii, 250 mg, Oral, BID  sodium chloride, 10 mL, Intravenous, Q12H  vitamin D3, 5,000 Units, Oral, Daily      Continuous Infusions:lactated ringers, 9 mL/hr, Last Rate: 9 mL/hr (01/06/23 1400)  sodium chloride, 50 mL/hr, Last Rate: 50 mL/hr (01/07/23 1121)      PRN Meds:.•  [DISCONTINUED] acetaminophen **OR** acetaminophen **OR** acetaminophen  •  acetaminophen  •  dextrose  •  dextrose  •  glucagon (human recombinant)  •  HYDROcodone-acetaminophen  •  HYDROmorphone **AND** naloxone  •  Morphine  **AND** naloxone  •  ondansetron **OR** ondansetron  •  potassium chloride **OR** potassium chloride **OR** potassium chloride  •  sodium chloride  •  sodium chloride    Assessment & Plan   Assessment & Plan     Active Hospital Problems    Diagnosis  POA   • **Sepsis ([facility]) [A41.9]  Yes   • Ulcer of right foot ([facility]) [L97.519]  Yes   • Type 2 diabetes mellitus ([facility]) [E11.9]  Yes   • Psoriatic arthritis ([facility]) [L40.50]  Yes   • Immunocompromised ([facility]) [D84.9]  Yes   • GERD (gastroesophageal reflux disease) [K21.9]  Yes   • Cellulitis of right foot [L03.115]  Unknown      Resolved Hospital Problems   No resolved problems to display.        Brief Hospital Course to date:  62 year old female with history of type 2 diabetes, psoriatic arthritis on immunosuppressive medications who presents with a right foot ulcer.  Patient was noted to have a leukocytosis of 20,000, and elevated procalcitonin and elevated CRP.  X-ray of right foot showed soft tissue swelling throughout the forefoot and midfoot jesting cellulitis, suggestion of gas production the soft tissues of the first digit and extending towards the second digit. MRI of the right foot no abscess or osteomyelitis.       Sepsis secondary to right foot ulcer with cellulitis in the setting of type 2 diabetes, Immunocompromised state  -Continue dapto and zosyn.  - MRSA +  -Wound culture with alpha strep  -S/p R 2nd toe transmetatarsal amputation with Dr. Raygoza  -Hold methotrexate  -ID and surg recs appreciated      Diabetes mellitus type 2  -Hemoglobin A1c noted to be 8.2  -Continue sliding scale insulin, add levemir 10u qhs for better control    Acute anemia  -H/H stable, monitor for any bleeding.     Psoriatic arthritis   -on methotrexate: hold for now      GERD  -PPI      Hyperlipidemia  -continue statin      DVT prophylaxis:  scds to LLE      Expected Discharge Location and Transportation: home vs rehab  Expected Discharge   Expected Discharge Date and Time     Expected  Discharge Date Expected Discharge Time    Jan 12, 2023            DVT prophylaxis:  Medical and mechanical DVT prophylaxis orders are present.     AM-PAC 6 Clicks Score (PT): 18 (01/08/23 0800)    CODE STATUS:   Code Status and Medical Interventions:   Ordered at: 01/05/23 7824     Level Of Support Discussed With:    Patient     Code Status (Patient has no pulse and is not breathing):    CPR (Attempt to Resuscitate)     Medical Interventions (Patient has pulse or is breathing):    Full Support     This patient's problems and plans were partially entered by my partner and updated as appropriate by me 01/08/23. Today is my first day evaluating this patient's active medical problems. I Personally reviewed chart and adjusted note to reflect daily changes in management/clinical condition. Copied text in this note has been reviewed and is accurate as of 01/08/23.       Isela Jasmine MD  01/08/23                Electronically signed by Isela Jasmine MD at 01/08/23 1435     Kevin Abdul MD at 01/08/23 0848          Maura Leon  1960  [id number]     Chief Complaint:  Right foot infection    Reason for Consultation: right foot infection    History of present illness:     Patient is a 62 y.o.  Yr old female with history of psoriatic arthritis on chronic methotrexate, prior right partial hallux amputation associated with group B strep per patient, surgery done in [place] and other specifics of care unclear.  Follows with podiatry in [place]; she has diabetes with chronic sensory neuropathy and has not noticed any recent exposures.  No specific blunt force or penetrating trauma.  She has not stepped on anything she knows of.  No animal insect or arthropod bite.  No fresh/brackish/salt water exposure.  No prior history MRSA VRE C. difficile or ESBL/KP C/CRE organisms.    She is admitted on January 5, 2023 with acute deterioration at the right foot.  She had just noticed a wound on the  "plantar side of her foot when she had spontaneous drainage and does not know how long it had been there.  She has no pain because of her sensory neuropathy.  She developed acute redness/swelling and was told to come to the emergency room by her podiatrist.  She was noted to have fever/leukocytosis with elevated procalcitonin, sepsis per admission H&P and abnormal MRI with plantar foot wound/ulceration over the second MTP joint with adjacent soft tissue and inflammatory phlegmon but no focal fluid collection per radiology and no osteomyelitis per radiology.  Initial x-ray did raise concern for gas in the soft tissue per radiology.  Dr. Raygoza has seen the patient and he is planning for surgical debridement on January 6 1/6/23 surgery by Dr Raygoza:  \"Pre-op Diagnosis: Gas gangrene of the right foot involving the plantar surface of the right second toe metatarsal head area  Post-op Diagnosis:   Same with extensive necrotizing fasciitis of the right second toe involving the flexor and extensor tendons.\"    \"Procedure(s): Right second toe transmetatarsal amputation through the distal third of the second metatarsal bone with extensive sharp soft tissue debridement.  Wound left open to heal by secondary intent/wound VAC therapy\"    1/6 MRSA surveillance culture positive    1/8/23 operative cultures with alpha strep so far.  Constipated; No fever;  postop no pain at the right foot with her sensory neuropathy.  She has redness/swelling unclear duration exactly but evolving over days.  She denies any other specific focal complaints    No headache photophobia or neck stiffness.  No shortness of breath cough or hemoptysis.  No nausea vomiting diarrhea or abdominal pain.  No dysuria hematuria or pyuria.  No skin rash    Review of Systems    1/8/23    Constitutional-- No  chills or sweats.  Appetite diminished with fatigue.  Heent-- No new vision, hearing or throat complaints.  No epistaxis or oral sores.  Denies odynophagia or " "dysphagia.  No flashers, floaters or eye pain. No odynophagia or dysphagia. No headache, photophobia or neck stiffness.  CV-- No chest pain, palpitation or syncope  Resp-- No SOB/cough/Hemoptysis  GI- No nausea, vomiting, or diarrhea.  No hematochezia, melena, or hematemesis. Denies jaundice or chronic liver disease.  -- No dysuria, hematuria, or flank pain.  Denies hesitancy, urgency or flank pain.  Lymph- no swollen lymph nodes in neck/axilla or groin.   Heme- No active bruising or bleeding; no Hx of DVT or PE.  MS-- no acute swelling or pain in the bones or joints of arms/legs aside from above.  No new back pain.  Neuro-- No acute focal weakness or numbness in the arms or legs.  No seizures.    Full 12 point review of systems reviewed and negative otherwise for acute complaints, except for above    Physical Exam: Nursing/chaperone present  Vital Signs   BP 98/57 (BP Location: Right arm, Patient Position: Lying)   Pulse 73   Temp 97.9 °F (36.6 °C) (Oral)   Resp 18   Ht 167.6 cm (66\")   Wt 116 kg (255 lb)   SpO2 99%   BMI 41.16 kg/m²     GENERAL: Awake and alert, in no acute distress.   HEENT: Normocephalic, atraumatic.   . No conjunctival injection. No icterus. Oropharynx clear without evidence of thrush or exudate. No evidence of peridontal disease.    NECK: Supple without nuchal rigidity. No mass.  LYMPH: No cervical, axillary or inguinal lymphadenopathy.  HEART: RRR; No murmur, rubs, gallops.   LUNGS: Clear to auscultation bilaterally without wheezing, rales, rhonchi. Normal respiratory effort. Nonlabored. No dullness.  ABDOMEN: Soft, nontender, nondistended. Positive bowel sounds. No rebound or guarding. NO mass or HSM.  EXT:  No cyanosis, clubbing . No cord.  See below   MSK: FROM without joint effusions noted arms/legs.    SKIN: Warm and dry without cutaneous eruptions on Inspection/palpation.  See below  NEURO: Oriented to PPT. No focal deficits on motor/sensory exam at arms/legs.    Right foot " surgical site noted;  Redness at foot no progression.  No discrete mass bulge or fluctuance.  No crepitus or bulla.  Partial right hallux amputation noted with healed surgical site.  Onychomycosis noted with thickened nails; no new purulence    No peripheral stigmata/phenomena of endocarditis    IV without obvious redness or drainage    Laboratory Data    Results from last 7 days   Lab Units 01/07/23  0353 01/06/23  0411 01/05/23  1755   WBC 10*3/mm3 13.24* 16.19* 20.01*   HEMOGLOBIN g/dL 11.7* 11.5* 13.1   HEMATOCRIT % 36.0 34.2 39.5   PLATELETS 10*3/mm3 275 287 367     Results from last 7 days   Lab Units 01/07/23  0353   SODIUM mmol/L 135*   POTASSIUM mmol/L 3.9   CHLORIDE mmol/L 102   CO2 mmol/L 19.0*   BUN mg/dL 16   CREATININE mg/dL 0.96   GLUCOSE mg/dL 211*   CALCIUM mg/dL 8.8     Results from last 7 days   Lab Units 01/05/23  1755   ALK PHOS U/L 113   BILIRUBIN mg/dL 1.2   ALT (SGPT) U/L 44*   AST (SGOT) U/L 25     Results from last 7 days   Lab Units 01/05/23  1755   SED RATE mm/hr 74*     Results from last 7 days   Lab Units 01/05/23  1755   CRP mg/dL 12.67*       Estimated Creatinine Clearance: 79.7 mL/min (by C-G formula based on SCr of 0.96 mg/dL).      Microbiology:      Radiology:  Imaging Results (Last 72 Hours)     Procedure Component Value Units Date/Time    MRI Foot Right With & Without Contrast [[id number]] Collected: 01/06/23 0209     Updated: 01/06/23 0413    Narrative:      MRI OF THE RIGHT FOOT WITH AND WITHOUT INTRAVENOUS CONTRAST    HISTORY: Right forefoot pain.    TECHNIQUE: Multiplanar and multisequence MR imaging of the right forefoot was performed without the administration of intravenous gadolinium. Imaging sequences include axial and sagittal T1, axial and sagittal proton density, coronal and sagittal   fat-suppressed proton density, and axial fat-suppressed T2-weighted images. 20 mL of MultiHance was administered.    COMPARISON: None.    FINDINGS:    There is a large amount of diffuse  soft tissue swelling of the forefoot that could be a mixture of cellulitis and dependent edema. There is a wound/ulceration over the plantar aspect of the second metatarsal phalangeal joint with some necrosis and poor   enhancement of the plantar soft tissues at this site. There is no abscess there is no soft tissue gas. No evidence of osteomyelitis. Partial amputation of the great toe.          Impression:      1. No abscess. No osteomyelitis.  2. Plantar wound/ulceration over the second metatarsal phalangeal joint with some adjacent soft tissue necrosis an inflammatory phlegmon but no well-defined fluid collection.  3. Diffuse soft tissue swelling that could be a mixture of cellulitis and dependent edema.    Electronically signed by:  Burke Evans M.D.    1/6/2023 2:11 AM Mountain Time    XR Foot 3+ View Right [[id number]] Collected: 01/05/23 2033     Updated: 01/05/23 2039    Narrative:      XR FOOT 3+ VW RIGHT    Date of Exam: 1/5/2023 8:06 PM EST    Indication: DM with foot ulcer; rule out gas in tissue.    Comparison: None available.    Findings:  Postoperative changes are noted status post prior resection of the great toe at the level of the distal diaphysis of the proximal phalanx. There is soft tissue swelling within the residual soft tissues of the great toe extending into the soft tissues of   the second through fifth digits and surrounding the forefoot. There is also evidence for edema extending towards the midfoot. The findings suggest changes of soft tissue cellulitis. There is suggestion of possible gas production in the soft tissues at   the interdigital space between the first and second metatarsophalangeal joints and extending into the base of the residual stump of the first digit as well as the proximal aspect of the second digit. No definitive cortical erosion or destruction is seen.   There is no definitive evidence for osteomyelitis. Changes of arthropathy are seen throughout the midfoot  suggesting developing neuropathic arthropathy or Charcot foot.      Impression:      Impression:  1.Soft tissue swelling throughout the forefoot and midfoot. The findings suggest changes of soft tissue cellulitis.  2.There is suggestion of gas production in the soft tissues of the residual first digit and extending towards the base of the second digit.  3.No definitive evidence for osteomyelitis.    Electronically Signed: Laith Morgan    1/5/2023 8:37 PM EST    Workstation ID: [id number]            Impression:     --Acute sepsis present on admission per medicine notes, in the setting of chronic immunosuppression/psoriatic arthritis and acute deterioration in her right foot with right foot infection most likely source.  Significant leukocytosis with abnormal procalcitonin and abnormal imaging.     Timing/option of threshold including extent for any further debridement/amputation at discretion of Dr. Raygoza; surgery 1/6 as above    --acute /severe right foot infection with necrotizing fasciitis at surgery as above    **Cx with alpha strep so far    --MRSA surveillance culture positivity    -- Psoriatic arthritis with chronic methotrexate, currently on hold by medicine team.    -- Diabetes with chronic sensory neuropathy.  She understands the importance of protecting her feet.  You need to tightly control blood sugar to give best chance for healing    -- Antibiotic allergies as above to clindamycin, doxycycline, sulfa and fluoroquinolones      PLAN:      -- IV daptomycin/Zosyn    -- Check/review labs cultures and scans    -- Partial history per nursing staff    -- Discussed with microbiology    -- Highly complex set of issues with high risk for further serious morbidity and other serious sequela    -- Discussed with Dr. Raygoza.  Severe infection as outlined above.      **Cx data pending.    Copied text in this note has been reviewed and is accurate as of 01/08/23.        Kevin Abdul,  MD  1/8/2023                Electronically signed by Kevin Abdul MD at 01/08/23 0951     Javon Raygoza MD at 01/08/23 0552          Cardiothoracic Surgery Progress Note      POD #: 2-transmetatarsal amputation right foot with extensive soft tissue sharp debridement.  Wound left open to heal by secondary intent.     LOS: 3 days      Subjective: Awake alert    Objective:  Vital Signs vital signs below noted T-max past 24 hours 99.6 °F  Temp:  [97.9 °F (36.6 °C)-99.6 °F (37.6 °C)] 97.9 °F (36.6 °C)  Heart Rate:  [73-95] 73  Resp:  [16-18] 18  BP: ()/(57-65) 98/57    Physical Exam:   General Appearance: Oriented x3   Lungs:   Heart:   Skin:   Incision: Wound VAC in place.  Dry dressing-no drainage noted.     Results: White blood cell count 13,200 hematocrit 36% creatinine 0.96 BUN 16  Results from last 7 days   Lab Units 01/07/23  0353   WBC 10*3/mm3 13.24*   HEMOGLOBIN g/dL 11.7*   HEMATOCRIT % 36.0   PLATELETS 10*3/mm3 275     Results from last 7 days   Lab Units 01/07/23  0353   SODIUM mmol/L 135*   POTASSIUM mmol/L 3.9   CHLORIDE mmol/L 102   CO2 mmol/L 19.0*   BUN mg/dL 16   CREATININE mg/dL 0.96   GLUCOSE mg/dL 211*   CALCIUM mg/dL 8.8         Assessment: #1 postop day 2 right second toe transmetatarsal amputation wound left open to heal by secondary intent and closure by wound VAC.  2.  Diabetic neuropathy with plantar surface ulceration metatarsal head area with gas gangrene in the deep soft tissues      Plan: Wound VAC management PT wound care.  Overall medical manage per hospitalist.  Antibiotics per infectious disease.      Javon Raygoza MD - 01/08/23 - 05:52 EST        Electronically signed by Javon Raygoza MD at 01/08/23 0553

## 2023-01-11 ENCOUNTER — TELEPHONE (OUTPATIENT)
Dept: CARDIAC SURGERY | Facility: CLINIC | Age: 63
End: 2023-01-11

## 2023-01-11 LAB
BACTERIA SPEC ANAEROBE CULT: NORMAL
BACTERIA SPEC ANAEROBE CULT: NORMAL
CYTO UR: NORMAL
GLUCOSE BLDC GLUCOMTR-MCNC: 234 MG/DL (ref 70–130)
GLUCOSE BLDC GLUCOMTR-MCNC: 270 MG/DL (ref 70–130)
GLUCOSE BLDC GLUCOMTR-MCNC: 270 MG/DL (ref 70–130)
GLUCOSE BLDC GLUCOMTR-MCNC: 273 MG/DL (ref 70–130)
GLUCOSE BLDC GLUCOMTR-MCNC: 291 MG/DL (ref 70–130)
LAB AP CASE REPORT: NORMAL
LAB AP CLINICAL INFORMATION: NORMAL
PATH REPORT.FINAL DX SPEC: NORMAL
PATH REPORT.GROSS SPEC: NORMAL

## 2023-01-11 PROCEDURE — 97605 NEG PRS WND THER DME<=50SQCM: CPT

## 2023-01-11 PROCEDURE — 82962 GLUCOSE BLOOD TEST: CPT

## 2023-01-11 PROCEDURE — 63710000001 INSULIN LISPRO (HUMAN) PER 5 UNITS: Performed by: NURSE PRACTITIONER

## 2023-01-11 PROCEDURE — 99232 SBSQ HOSP IP/OBS MODERATE 35: CPT | Performed by: NURSE PRACTITIONER

## 2023-01-11 PROCEDURE — 25010000002 CEFTRIAXONE PER 250 MG: Performed by: INTERNAL MEDICINE

## 2023-01-11 PROCEDURE — 25010000002 HEPARIN (PORCINE) PER 1000 UNITS: Performed by: THORACIC SURGERY (CARDIOTHORACIC VASCULAR SURGERY)

## 2023-01-11 PROCEDURE — 99024 POSTOP FOLLOW-UP VISIT: CPT | Performed by: THORACIC SURGERY (CARDIOTHORACIC VASCULAR SURGERY)

## 2023-01-11 PROCEDURE — 25010000002 PIPERACILLIN SOD-TAZOBACTAM PER 1 G: Performed by: INTERNAL MEDICINE

## 2023-01-11 PROCEDURE — 63710000001 INSULIN DETEMIR PER 5 UNITS: Performed by: NURSE PRACTITIONER

## 2023-01-11 PROCEDURE — 63710000001 INSULIN LISPRO (HUMAN) PER 5 UNITS: Performed by: PHYSICIAN ASSISTANT

## 2023-01-11 RX ORDER — CASTOR OIL AND BALSAM, PERU 788; 87 MG/G; MG/G
1 OINTMENT TOPICAL EVERY 12 HOURS SCHEDULED
Status: DISCONTINUED | OUTPATIENT
Start: 2023-01-11 | End: 2023-01-14 | Stop reason: HOSPADM

## 2023-01-11 RX ORDER — INSULIN LISPRO 100 [IU]/ML
0-24 INJECTION, SOLUTION INTRAVENOUS; SUBCUTANEOUS
Status: DISCONTINUED | OUTPATIENT
Start: 2023-01-11 | End: 2023-01-11

## 2023-01-11 RX ORDER — INSULIN LISPRO 100 [IU]/ML
0-24 INJECTION, SOLUTION INTRAVENOUS; SUBCUTANEOUS
Status: DISCONTINUED | OUTPATIENT
Start: 2023-01-11 | End: 2023-01-14 | Stop reason: HOSPADM

## 2023-01-11 RX ORDER — INSULIN LISPRO 100 [IU]/ML
5 INJECTION, SOLUTION INTRAVENOUS; SUBCUTANEOUS
Status: DISCONTINUED | OUTPATIENT
Start: 2023-01-11 | End: 2023-01-14 | Stop reason: HOSPADM

## 2023-01-11 RX ADMIN — INSULIN LISPRO 5 UNITS: 100 INJECTION, SOLUTION INTRAVENOUS; SUBCUTANEOUS at 16:40

## 2023-01-11 RX ADMIN — MINERAL OIL, AND WHITE PETROLATUM: 425; 573 OINTMENT OPHTHALMIC at 21:33

## 2023-01-11 RX ADMIN — Medication 10 ML: at 21:33

## 2023-01-11 RX ADMIN — HEPARIN SODIUM 5000 UNITS: 5000 INJECTION INTRAVENOUS; SUBCUTANEOUS at 08:12

## 2023-01-11 RX ADMIN — ASPIRIN 81 MG CHEWABLE TABLET 81 MG: 81 TABLET CHEWABLE at 08:11

## 2023-01-11 RX ADMIN — INSULIN LISPRO 3 UNITS: 100 INJECTION, SOLUTION INTRAVENOUS; SUBCUTANEOUS at 12:01

## 2023-01-11 RX ADMIN — INSULIN LISPRO 8 UNITS: 100 INJECTION, SOLUTION INTRAVENOUS; SUBCUTANEOUS at 12:01

## 2023-01-11 RX ADMIN — INSULIN LISPRO 12 UNITS: 100 INJECTION, SOLUTION INTRAVENOUS; SUBCUTANEOUS at 21:32

## 2023-01-11 RX ADMIN — HEPARIN SODIUM 5000 UNITS: 5000 INJECTION INTRAVENOUS; SUBCUTANEOUS at 21:33

## 2023-01-11 RX ADMIN — CEFTRIAXONE SODIUM 2 G: 2 INJECTION, POWDER, FOR SOLUTION INTRAMUSCULAR; INTRAVENOUS at 10:30

## 2023-01-11 RX ADMIN — INSULIN DETEMIR 15 UNITS: 100 INJECTION, SOLUTION SUBCUTANEOUS at 08:09

## 2023-01-11 RX ADMIN — NYSTATIN: 100000 POWDER TOPICAL at 16:41

## 2023-01-11 RX ADMIN — INSULIN LISPRO 8 UNITS: 100 INJECTION, SOLUTION INTRAVENOUS; SUBCUTANEOUS at 08:10

## 2023-01-11 RX ADMIN — NYSTATIN: 100000 POWDER TOPICAL at 05:12

## 2023-01-11 RX ADMIN — Medication 5000 UNITS: at 08:12

## 2023-01-11 RX ADMIN — Medication 1 TABLET: at 08:11

## 2023-01-11 RX ADMIN — INSULIN DETEMIR 30 UNITS: 100 INJECTION, SOLUTION SUBCUTANEOUS at 21:33

## 2023-01-11 RX ADMIN — ATORVASTATIN CALCIUM 10 MG: 10 TABLET, FILM COATED ORAL at 08:12

## 2023-01-11 RX ADMIN — Medication 250 MG: at 08:11

## 2023-01-11 RX ADMIN — Medication 250 MG: at 21:33

## 2023-01-11 RX ADMIN — HYDROCHLOROTHIAZIDE 50 MG: 25 TABLET ORAL at 08:11

## 2023-01-11 RX ADMIN — CASTOR OIL AND BALSAM, PERU 1 APPLICATION: 788; 87 OINTMENT TOPICAL at 16:42

## 2023-01-11 RX ADMIN — CETIRIZINE HYDROCHLORIDE 5 MG: 10 TABLET, FILM COATED ORAL at 08:12

## 2023-01-11 RX ADMIN — NYSTATIN: 100000 POWDER TOPICAL at 21:33

## 2023-01-11 RX ADMIN — FOLIC ACID 0.5 MG: 1 TABLET ORAL at 08:11

## 2023-01-11 RX ADMIN — PANTOPRAZOLE SODIUM 40 MG: 40 TABLET, DELAYED RELEASE ORAL at 05:11

## 2023-01-11 RX ADMIN — INSULIN LISPRO 3 UNITS: 100 INJECTION, SOLUTION INTRAVENOUS; SUBCUTANEOUS at 08:09

## 2023-01-11 RX ADMIN — INSULIN LISPRO 12 UNITS: 100 INJECTION, SOLUTION INTRAVENOUS; SUBCUTANEOUS at 16:40

## 2023-01-11 RX ADMIN — TAZOBACTAM SODIUM AND PIPERACILLIN SODIUM 3.38 G: 375; 3 INJECTION, SOLUTION INTRAVENOUS at 05:11

## 2023-01-11 NOTE — PROGRESS NOTES
[facility] Medicine Services  PROGRESS NOTE    Patient Name: Maura Leon  : 1960  MRN: [id number]    Date of Admission: 2023  Primary Care Physician: Emanuel Fuentes DPM    Subjective   Subjective     CC:  Diabetic ulcer    HPI:  Patient sitting up in bed with wound VAC on top of right foot and waffle boots.  Patient states she is feeling much better and is hoping to be discharged tomorrow to rehab.  Cardinal Goldman has darted preauthorization pending blood sugars are still in the 300-400s, so adjusted insulin.  Patient has no complaints.     ROS:  Gen- No fevers, chills  CV- No chest pain, palpitations  Resp- No cough, dyspnea  GI- No N/V/D, abd pain  All other systems have been reviewed and the pertinent positives and negatives are listed above in the HPI or ROS      Objective   Objective     Vital Signs:   Temp:  [96.7 °F (35.9 °C)-98.3 °F (36.8 °C)] 96.7 °F (35.9 °C)  Heart Rate:  [64-89] 64  Resp:  [16-18] 18  BP: (114-127)/(61-94) 114/61     Physical Exam:  Constitutional: Alert, morbidly obese female sitting up in bed with wound VAC on the top of right foot  Eyes: EOMI, sclerae anicteric, no conjunctival injection  Head: NCAT  ENT: [place], moist mucous membranes   Respiratory: Nonlabored, symmetrical chest expansion, CTAB, 96%RA  Cardiovascular: RRR, HR 84, no M/R/G  Gastrointestinal: Morbidly obese, soft, NT, ND +BS  Musculoskeletal: RENEE; no LE edema bilaterally; feet in waffle boots bilaterally  Neurologic: Oriented x4, strength symmetric in all extremities, follows all commands, speech clear  Skin: No rashes on exposed skin; right foot wrapped with wound VAC in place-CDI  Psychiatric: Pleasant and cooperative; normal affect    Results Reviewed:  LAB RESULTS:      Lab 23  0256 23  0353 23  0411 23  1755   WBC 8.53 13.24* 16.19* 20.01*   HEMOGLOBIN 11.4* 11.7* 11.5* 13.1   HEMATOCRIT 34.7 36.0 34.2 39.5   PLATELETS 313 275 287 367   NEUTROS ABS  --   11.18* 13.72* 17.97*   IMMATURE GRANS (ABS)  --  0.08* 0.12* 0.08*   LYMPHS ABS  --  0.84 1.05 0.90   MONOS ABS  --  0.96* 1.20* 0.97*   EOS ABS  --  0.12 0.03 0.01   MCV 96.7 97.0 94.0 94.5   SED RATE  --   --   --  74*   CRP  --   --   --  12.67*   PROCALCITONIN  --   --   --  0.35*   LACTATE  --   --   --  1.9         Lab 01/09/23  0256 01/07/23  0353 01/06/23  0411 01/05/23  1755   SODIUM 137 135* 137 135*   POTASSIUM 3.9 3.9 3.1* 3.6   CHLORIDE 99 102 99 95*   CO2 28.0 19.0* 27.0 27.0   ANION GAP 10.0 14.0 11.0 13.0   BUN 15 16 14 16   CREATININE 1.00 0.96 0.77 0.94   EGFR 64.2 67.5 87.9 69.2   GLUCOSE 352* 211* 189* 364*   CALCIUM 9.2 8.8 9.3 9.7   MAGNESIUM  --   --  1.9  --    HEMOGLOBIN A1C  --   --  8.20*  --          Lab 01/09/23  0256 01/05/23  1755   TOTAL PROTEIN 6.1 8.3   ALBUMIN 3.2* 4.3   GLOBULIN 2.9 4.0   ALT (SGPT) 43* 44*   AST (SGOT) 22 25   BILIRUBIN 0.6 1.2   ALK PHOS 98 113                 Lab 01/06/23  1155   ABO TYPING O   RH TYPING Positive   ANTIBODY SCREEN Negative         Brief Urine Lab Results     None          Microbiology Results Abnormal     Procedure Component Value - Date/Time    Blood Culture - Blood, Arm, Left [[id number]]  (Normal) Collected: 01/05/23 2105    Lab Status: Final result Specimen: Blood from Arm, Left Updated: 01/10/23 2131     Blood Culture No growth at 5 days    Blood Culture - Blood, Arm, Left [[id number]]  (Normal) Collected: 01/05/23 1758    Lab Status: Final result Specimen: Blood from Arm, Left Updated: 01/10/23 1815     Blood Culture No growth at 5 days    Anaerobic Culture - Wound, Toe, Right [[id number]]  (Normal) Collected: 01/06/23 1733    Lab Status: Preliminary result Specimen: Wound from Toe, Right Updated: 01/09/23 0747     Anaerobic Culture No anaerobes isolated at 3 days    Anaerobic Culture - Tissue, Toe, Right [[id number]]  (Normal) Collected: 01/06/23 1832    Lab Status: Preliminary result Specimen: Tissue from Toe, Right Updated: 01/09/23 0763      Anaerobic Culture No anaerobes isolated at 3 days    Wound Culture - Swab, Foot, Right [[id number]] Collected: 01/05/23 2201    Lab Status: Final result Specimen: Swab from Foot, Right Updated: 01/08/23 0730     Wound Culture Moderate growth (3+) Normal Skin Kayli     Gram Stain No WBCs or organisms seen          No radiology results from the last 24 hrs        I have reviewed the medications:  Scheduled Meds:artificial tears, , Both Eyes, Nightly  aspirin, 81 mg, Oral, Daily  atorvastatin, 10 mg, Oral, Daily  Calcium Carb-Cholecalciferol, 1 tablet, Oral, Daily  cetirizine, 5 mg, Oral, Daily  folic acid, 0.5 mg, Oral, Daily  heparin (porcine), 5,000 Units, Subcutaneous, Q12H  hydroCHLOROthiazide, 50 mg, Oral, Daily  insulin detemir, 15 Units, Subcutaneous, Q12H  insulin lispro, 0-14 Units, Subcutaneous, 4x Daily With Meals & Nightly  Insulin Lispro, 3 Units, Subcutaneous, TID With Meals  nystatin, , Topical, Q8H  pantoprazole, 40 mg, Oral, Q AM  piperacillin-tazobactam, 3.375 g, Intravenous, Q8H  saccharomyces boulardii, 250 mg, Oral, BID  sodium chloride, 10 mL, Intravenous, Q12H  vitamin D3, 5,000 Units, Oral, Daily      Continuous Infusions:sodium chloride, 50 mL/hr, Last Rate: 50 mL/hr (01/10/23 2145)      PRN Meds:.•  [DISCONTINUED] acetaminophen **OR** acetaminophen **OR** acetaminophen  •  acetaminophen  •  betamethasone (augmented)  •  dextrose  •  dextrose  •  glucagon (human recombinant)  •  HYDROcodone-acetaminophen  •  HYDROmorphone **AND** naloxone  •  Morphine **AND** naloxone  •  ondansetron **OR** ondansetron  •  potassium chloride **OR** potassium chloride **OR** potassium chloride  •  sodium chloride  •  sodium chloride    Assessment & Plan   Assessment & Plan     Active Hospital Problems    Diagnosis  POA   • **Sepsis (HCC) [A41.9]  Yes   • Ulcer of right foot (HCC) [L97.519]  Yes   • Type 2 diabetes mellitus (HCC) [E11.9]  Yes   • Psoriatic arthritis (HCC) [L40.50]  Yes   • Immunocompromised  ([facility]) [D84.9]  Yes   • GERD (gastroesophageal reflux disease) [K21.9]  Yes   • Cellulitis of right foot [L03.115]  Unknown      Resolved Hospital Problems   No resolved problems to display.        Brief Hospital Course to date:  62 year old female with history of type 2 diabetes, psoriatic arthritis on immunosuppressive medications who presents with a right foot ulcer.  Patient was noted to have a leukocytosis of 20,000, and elevated procalcitonin and elevated CRP.  X-ray of right foot showed soft tissue swelling throughout the forefoot and midfoot jesting cellulitis, suggestion of gas production the soft tissues of the first digit and extending towards the second digit. MRI of the right foot no abscess or osteomyelitis.      Sepsis secondary to right foot ulcer with cellulitis in the setting of type 2 diabetes, Immunocompromised state  -Continue dapto and zosyn.  -MRSA +  -Wound culture with alpha strep  -S/p R 2nd toe transmetatarsal amputation with Dr. Raygoza on 1/6/2023  --Wound VAC in place.  --Holding methotrexate  -ID and surg following  --AM labs on 1/9/2023 unremarkable    T2DM w/ A1c 8.2  --24H glucose 234-471  --Increased Levemir 30 units Q12H; adjust as needed  --Patient takes Tresiba 60 units AM and 80 units PM  --Mealtime bolus Humalog 3 units before meals with hold parameters    Acute anemia-stable  --D/t postop blood loss; admitting Hgb 13.1  --Hgb 11.4     Psoriatic arthritis   -on methotrexate: hold for now      GERD  -PPI      Hyperlipidemia  -continue statin      DVT prophylaxis:  scds to LLE      Expected Discharge Location and Transportation: home vs rehab  Expected Discharge   Expected Discharge Date and Time     Expected Discharge Date Expected Discharge Time    Jan 13, 2023            DVT prophylaxis:  Medical and mechanical DVT prophylaxis orders are present.     AM-PAC 6 Clicks Score (PT): 19 (01/10/23 2000)    CODE STATUS:   Code Status and Medical Interventions:   Ordered at: 01/05/23  2325     Level Of Support Discussed With:    Patient     Code Status (Patient has no pulse and is not breathing):    CPR (Attempt to Resuscitate)     Medical Interventions (Patient has pulse or is breathing):    Full Support       Ana Carrera, JOEY  01/11/23

## 2023-01-11 NOTE — THERAPY WOUND CARE TREATMENT
Acute Care - Wound/Debridement Treatment Note  [facility]     Patient Name: Maura Leon  : 1960  MRN: [id number]  Today's Date: 2023                Admit Date: 2023    Visit Dx:    ICD-10-CM ICD-9-CM   1. Cellulitis of right foot  L03.115 682.7   2. History of diabetes mellitus, type II  Z86.39 V12.29       Patient Active Problem List   Diagnosis   • Sepsis (HCC)   • Ulcer of right foot (HCC)   • Type 2 diabetes mellitus (HCC)   • Psoriatic arthritis (HCC)   • Immunocompromised (HCC)   • GERD (gastroesophageal reflux disease)   • Cellulitis of right foot        Past Medical History:   Diagnosis Date   • Arthritis    • Diabetes mellitus (HCC)    • GERD (gastroesophageal reflux disease)    • Irritable bowel    • Sleep apnea         Past Surgical History:   Procedure Laterality Date   • AMPUTATION DIGIT Right 2023    Procedure: FOOT SURGICAL EXPLORATION WITH TOE AMPUTATION RIGHT;  Surgeon: Javon Raygoza MD;  Location: [facility], 1928–1983;  Service: General;  Laterality: Right;   • TOE SURGERY Right 2022           Wound 23 2315 Right posterior plantar Diabetic Ulcer (Active)   Base dressing in place, unable to visualize 23 0811       Wound 23 1733 Right anterior foot Incision (Active)   Dressing Appearance dry;intact 23 1300   Closure SORAIDA 23 0811   Base dressing in place, unable to visualize 23 0811       Wound 23 0940 Bilateral posterior thigh Pressure Injury (Active)   Wound Image    23 0940   Pressure Injury Stage DTPI 23 0940       NPWT (Negative Pressure Wound Therapy) 23 0900 R 2nd toe amputation site (Active)   Therapy Setting continuous therapy 23 1300   Pressure Setting 150 mmHg 23 1300         WOUND DEBRIDEMENT  Total area of Debridement: ~5cm2  Debridement Site 1  Location- Site 1: Amputation site  Selective Debridement- Site 1: Wound Surface <20cmsq  Instruments- Site 1: tweezers  Excised Tissue Description- Site 1:  minimum, slough  Bleeding- Site 1: none               PT Assessment (last 12 hours)     PT Evaluation and Treatment     Row Name 01/11/23 1300          Physical Therapy Time and Intention    Subjective Information complains of;weakness  -MF     Document Type therapy note (daily note);wound care  -MF     Mode of Treatment physical therapy  -     Row Name             Wound 01/05/23 2315 Right posterior plantar Diabetic Ulcer    Wound - Properties Group Placement Date: 01/05/23  -DP Placement Time: 2315  -DP Present on Hospital Admission: Y  -DP Side: Right  -DP Orientation: posterior  -DP Location: plantar  -DP Primary Wound Type: Diabetic ulc  -DP    Retired Wound - Properties Group Placement Date: 01/05/23  -DP Placement Time: 2315  -DP Present on Hospital Admission: Y  -DP Side: Right  -DP Orientation: posterior  -DP Location: plantar  -DP Primary Wound Type: Diabetic ulc  -DP    Retired Wound - Properties Group Date first assessed: 01/05/23  -DP Time first assessed: 2315  -DP Present on Hospital Admission: Y  -DP Side: Right  -DP Location: plantar  -DP Primary Wound Type: Diabetic ulc  -DP    Row Name 01/11/23 1300          Wound 01/06/23 1733 Right anterior foot Incision    Wound - Properties Group Placement Date: 01/06/23  -EQ Placement Time: 1733  -EQ Present on Hospital Admission: N  -EQ Side: Right  -EQ Orientation: anterior  -EQ Location: foot  -EQ Primary Wound Type: Incision  -EQ    Dressing Appearance dry;intact  -MF     Retired Wound - Properties Group Placement Date: 01/06/23  -EQ Placement Time: 1733  -EQ Present on Hospital Admission: N  -EQ Side: Right  -EQ Orientation: anterior  -EQ Location: foot  -EQ Primary Wound Type: Incision  -EQ    Retired Wound - Properties Group Date first assessed: 01/06/23  -EQ Time first assessed: 1733  -EQ Present on Hospital Admission: N  -EQ Side: Right  -EQ Location: foot  -EQ Primary Wound Type: Incision  -EQ    Row Name             Wound 01/11/23 0940 Bilateral  posterior thigh Pressure Injury    Wound - Properties Group Placement Date: 01/11/23  -MFA Placement Time: 0940  -MFA Present on Hospital Admission: N  -MFA Side: Bilateral  -MFA Orientation: posterior  -MFA Location: thigh  -MFA Primary Wound Type: Pressure inj  -MFA, suspected DTPI     Retired Wound - Properties Group Placement Date: 01/11/23  -MFA Placement Time: 0940  -MFA Present on Hospital Admission: N  -MFA Side: Bilateral  -MFA Orientation: posterior  -MFA Location: thigh  -MFA Primary Wound Type: Pressure inj  -MFA, suspected DTPI     Retired Wound - Properties Group Date first assessed: 01/11/23  -MFA Time first assessed: 0940  -MFA Present on Hospital Admission: N  -MFA Side: Bilateral  -MFA Location: thigh  -MFA Primary Wound Type: Pressure inj  -MFA, suspected DTPI     Row Name 01/11/23 1300          NPWT (Negative Pressure Wound Therapy) 01/07/23 0900 R 2nd toe amputation site    NPWT (Negative Pressure Wound Therapy) - Properties Group Placement Date: 01/07/23  - Placement Time: 0900  -LH Location: R 2nd toe amputation site  -LH    Therapy Setting continuous therapy  -     Pressure Setting 150 mmHg  -     Retired NPWT (Negative Pressure Wound Therapy) - Properties Group Placement Date: 01/07/23  - Placement Time: 0900  -LH Location: R 2nd toe amputation site  -LH    Retired NPWT (Negative Pressure Wound Therapy) - Properties Group Placement Date: 01/07/23  - Placement Time: 0900  -LH Location: R 2nd toe amputation site  -LH    Row Name 01/11/23 1300          Coping    Observed Emotional State calm;cooperative;pleasant  -     Verbalized Emotional State acceptance  -     Trust Relationship/Rapport care explained  -     Row Name 01/11/23 1300          Plan of Care Review    Plan of Care Reviewed With patient  -     Outcome Evaluation wound vac intact with possible d/c to rehab tomorrow.  -           User Key  (r) = Recorded By, (t) = Taken By, (c) = Cosigned By    Initials Name  Provider Type     Kevin Marin, PT Physical Therapist    Rafy Linares, PT Physical Therapist    Nai Padilla, RN Registered Nurse    Aicha Thomas, RN Registered Nurse    Destiny Carmichael, RN Registered Nurse              Physical Therapy Education     Title: PT OT SLP Therapies (In Progress)     Topic: Physical Therapy (Done)     Point: Mobility training (Done)     Learning Progress Summary           Patient Acceptance, E, VU by AE at 1/10/2023 1535    Acceptance, E, VU by AE at 1/9/2023 1108    Acceptance, E, VU,NR by KG at 1/7/2023 1529                   Point: Home exercise program (Done)     Learning Progress Summary           Patient Acceptance, E, VU by AE at 1/10/2023 1535    Acceptance, E, VU by AE at 1/9/2023 1108    Acceptance, E, VU,NR by KG at 1/7/2023 1529                   Point: Body mechanics (Done)     Learning Progress Summary           Patient Acceptance, E, VU by AE at 1/10/2023 1535    Acceptance, E, VU by AE at 1/9/2023 1108    Acceptance, E, VU,NR by KG at 1/7/2023 1529                   Point: Precautions (Done)     Learning Progress Summary           Patient Acceptance, E, VU by AE at 1/10/2023 1535    Acceptance, E, VU by AE at 1/9/2023 1108    Acceptance, E, VU,NR by KG at 1/7/2023 1529                               User Key     Initials Effective Dates Name Provider Type Discipline    KG 01/04/23 -  Kaity Narvaez Physical Therapist PT    AE 09/21/21 -  Brian Horton PT Physical Therapist PT                Recommendation and Plan  Anticipated Equipment Needs at Discharge (PT): front wheeled walker  Anticipated Discharge Disposition (PT): inpatient rehabilitation facility  Planned Therapy Interventions (PT): wound care, patient/family education  Therapy Frequency (PT): daily  Plan of Care Reviewed With: patient           Outcome Evaluation: wound vac intact with possible d/c to rehab tomorrow.  Plan of Care Reviewed With: patient            Time  Calculation   PT Charges     Row Name 01/11/23 1300             Time Calculation    Start Time 1300  -MF      PT Goal Re-Cert Due Date 01/17/23  -MF         Untimed Charges    17017-Rxb Pressure wound to 50 sqcm 10  -MF         Total Minutes    Untimed Charges Total Minutes 10  -MF       Total Minutes 10  -MF            User Key  (r) = Recorded By, (t) = Taken By, (c) = Cosigned By    Initials Name Provider Type     Kevin Marin, PT Physical Therapist                  Therapy Charges for Today     Code Description Service Date Service Provider Modifiers Qty    21461739362 HC KELLEN DEBRIDE OPEN WOUND UP TO 20CM 1/10/2023 Kevin Marin, PT GP 1    02887784993 HC PT NEG PRESS WOUND TO 50SQCM DME2 1/10/2023 Kevin Marin, PT GP 1    02036149105 HC PT NEG PRESS WOUND TO 50SQCM DME1 1/11/2023 Kevin Marin, PT  1            PT G-Codes  Outcome Measure Options: AM-PAC 6 Clicks Basic Mobility (PT)  AM-PAC 6 Clicks Score (PT): 19  AM-PAC 6 Clicks Score (OT): 15       Kevin Marin, PT  1/11/2023

## 2023-01-11 NOTE — PROGRESS NOTES
"Maura Leon  1960  [id number]     Chief Complaint:  Right foot infection    Reason for Consultation: right foot infection    History of present illness:     Patient is a 62 y.o.  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  Follows with podiatry in [place]; she has diabetes with chronic sensory neuropathy and has not noticed any recent exposures.  No specific blunt force or penetrating trauma.  She has not stepped on anything she knows of.  No animal insect or arthropod bite.  No fresh/brackish/salt water exposure.  No prior history MRSA VRE C. difficile or ESBL/KP C/CRE organisms.    She is admitted on January 5, 2023 with acute deterioration at the right foot.  She had just noticed a wound on the plantar side of her foot when she had spontaneous drainage and does not know how long it had been there.  She has no pain because of her sensory neuropathy.  She developed acute redness/swelling and was told to come to the emergency room by her podiatrist.  She was noted to have fever/leukocytosis with elevated procalcitonin, sepsis per admission H&P and abnormal MRI with plantar foot wound/ulceration over the second MTP joint with adjacent soft tissue and inflammatory phlegmon but no focal fluid collection per radiology and no osteomyelitis per radiology.  Initial x-ray did raise concern for gas in the soft tissue per radiology.  Dr. Raygoza has seen the patient and he is planning for surgical debridement on January 6 1/6/23 surgery by Dr Raygoza:  \"Pre-op Diagnosis: Gas gangrene of the right foot involving the plantar surface of the right second toe metatarsal head area  Post-op Diagnosis:   Same with extensive necrotizing fasciitis of the right second toe involving the flexor and extensor tendons.\"    \"Procedure(s): Right second " "toe transmetatarsal amputation through the distal third of the second metatarsal bone with extensive sharp soft tissue debridement.  Wound left open to heal by secondary intent/wound VAC therapy\"    1/6 MRSA surveillance culture positive    1/11/23  Seen early and sleepy; operative cultures from foot with S Mitis, anaerobic culture negative.    Hx per nursing; No fever;  postop no pain at the right foot with her sensory neuropathy.  She has redness/swelling  Generally improving since surgery.  She denies any other specific focal complaints    No headache photophobia or neck stiffness.  No shortness of breath cough or hemoptysis.  No nausea vomiting diarrhea or abdominal pain.  No dysuria hematuria or pyuria.  No skin rash    Review of Systems    1/11/23 per nursing,     Constitutional-- No  chills or sweats.  Appetite better  Heent-- No new vision, hearing or throat complaints.  No epistaxis or oral sores.  Denies odynophagia or dysphagia.  No flashers, floaters or eye pain. No odynophagia or dysphagia. No headache, photophobia or neck stiffness.  CV-- No chest pain, palpitation or syncope  Resp-- No SOB/cough/Hemoptysis  GI- No nausea, vomiting, or diarrhea.  No hematochezia, melena, or hematemesis. Denies jaundice or chronic liver disease.  -- No dysuria, hematuria, or flank pain.  Denies hesitancy, urgency or flank pain.  Lymph- no swollen lymph nodes in neck/axilla or groin.   Heme- No active bruising or bleeding; no Hx of DVT or PE.  MS-- no acute swelling or pain in the bones or joints of arms/legs aside from above.  No new back pain.  Neuro-- No acute focal weakness or numbness in the arms or legs.  No seizures.    Full 12 point review of systems reviewed and negative otherwise for acute complaints, except for above    Physical Exam: Nursing/chaperone present  Vital Signs   /61 (BP Location: Left arm, Patient Position: Lying)   Pulse 64   Temp 96.7 °F (35.9 °C) (Oral)   Resp 18   Ht 167.6 cm (66\") "   Wt 116 kg (255 lb)   SpO2 96%   BMI 41.16 kg/m²     GENERAL: sleepy, in no acute distress.   HEENT: Normocephalic, atraumatic.   . No conjunctival injection. No icterus. Oropharynx clear without evidence of thrush or exudate. No evidence of peridontal disease.    NECK: Supple without nuchal rigidity. No mass.   HEART: RRR; No murmur, rubs, gallops.   LUNGS: Clear to auscultation bilaterally without wheezing, rales, rhonchi. Normal respiratory effort. Nonlabored. No dullness.  ABDOMEN: Soft, nontender, nondistended. Positive bowel sounds. No rebound or guarding. NO mass or HSM.  EXT:  No cyanosis, clubbing . No cord.  See below   MSK: FROM without joint effusions noted arms/legs.    SKIN: Warm and dry without cutaneous eruptions on Inspection/palpation.  See below  NEURO: sleepy    Right foot surgical site noted with VAC; d/w PT and granulation increased but still with exposed bone at base of wound;  Redness at foot less.  No discrete mass bulge or fluctuance.  No crepitus or bulla.  Partial right hallux amputation noted with healed surgical site.  Onychomycosis noted with thickened nails; no new purulence    No peripheral stigmata/phenomena of endocarditis    IV without obvious redness or drainage    Laboratory Data    Results from last 7 days   Lab Units 01/09/23  0256 01/07/23  0353 01/06/23  0411   WBC 10*3/mm3 8.53 13.24* 16.19*   HEMOGLOBIN g/dL 11.4* 11.7* 11.5*   HEMATOCRIT % 34.7 36.0 34.2   PLATELETS 10*3/mm3 313 275 287     Results from last 7 days   Lab Units 01/09/23  0256   SODIUM mmol/L 137   POTASSIUM mmol/L 3.9   CHLORIDE mmol/L 99   CO2 mmol/L 28.0   BUN mg/dL 15   CREATININE mg/dL 1.00   GLUCOSE mg/dL 352*   CALCIUM mg/dL 9.2     Results from last 7 days   Lab Units 01/09/23  0256   ALK PHOS U/L 98   BILIRUBIN mg/dL 0.6   ALT (SGPT) U/L 43*   AST (SGOT) U/L 22     Results from last 7 days   Lab Units 01/05/23  1755   SED RATE mm/hr 74*     Results from last 7 days   Lab Units 01/05/23  2962    CRP mg/dL 12.67*       Estimated Creatinine Clearance: 76.5 mL/min (by C-G formula based on SCr of 1 mg/dL).      Microbiology:      Radiology:  Imaging Results (Last 72 Hours)     ** No results found for the last 72 hours. **            Impression:     --Acute sepsis present on admission per medicine notes, in the setting of chronic immunosuppression/psoriatic arthritis and acute deterioration in her right foot with right foot infection most likely source.  Significant leukocytosis with abnormal procalcitonin and abnormal imaging at admit.     Timing/option of threshold including extent for any further debridement/amputation at discretion of Dr. Raygoza; surgery 1/6 as above    --acute /severe right foot infection with necrotizing fasciitis at surgery as above; exposed MT bone at base of wound    **Cx with S Mitis so far PCN sensitive;  Anaerobic culture negative    --MRSA surveillance culture positivity; no MRSA at her foot per microbiology    -- Psoriatic arthritis with chronic methotrexate, currently on hold by medicine team.    -- Diabetes with chronic sensory neuropathy.  She understands the importance of protecting her feet.  You need to tightly control blood sugar to give best chance for healing    -- Antibiotic allergies as above to clindamycin, doxycycline, sulfa and fluoroquinolones      PLAN:      -- IV  Rocephin; I anticipate IV abx 4-6 weeks depending on clinical course/tolerability, etc..    -- Check/review labs cultures and scans    -- Partial history per nursing staff    -- Discussed with microbiology    -- Highly complex set of issues with high risk for further serious morbidity and other serious sequela    -- Discussed with Dr. Raygoza and PT.  VAC at present      **pathology  pending    Copied text in this note has been reviewed and is accurate as of 01/11/23.        Kevin Abdul MD  1/11/2023

## 2023-01-11 NOTE — TELEPHONE ENCOUNTER
Caller: Maura Leon    Relationship: Self    Best call back number: [phone]    What form or medical record are you requesting: WORK ABSENCE       Who is requesting this form or medical record from you: EMPLOYER    How would you like to receive the form or medical records (pick-up, mail, fax):     PATIENT STATES PAPERWORK HAS BEEN FAXED TO OFFICE ON 1/11/23. PATIENT WOULD LIKE THAT TO BE FAXED TO HER EMPLOYER AT [email or web address] AND FAXED HERSELF AS WELL.   BOTH NUMBERS WILL BE LISTED ON DOCUMENTS.     Timeframe paperwork needed: ASAP     Additional notes: WOULD LIKE A CALL BACK TO CONFIRM.

## 2023-01-11 NOTE — CASE MANAGEMENT/SOCIAL WORK
Continued Stay Note  [facility]     Patient Name: Maura Leon  MRN: [id number]  Today's Date: 1/11/2023    Admit Date: 1/5/2023    Plan: [facility] once insurance approves   Discharge Plan     Row Name 01/11/23 1212       Plan    Plan Comments Social work called the surgery office and they said social work should fax the la paperwork to the surgery office and the patient has been given the phone number to call and speak with the office directly.               Discharge Codes    No documentation.               Expected Discharge Date and Time     Expected Discharge Date Expected Discharge Time    Jan 13, 2023             BAILEY Quintana

## 2023-01-11 NOTE — NURSING NOTE
WOC team assessing patient for IAD given incontinence noted in patient's chart.  Patient agreeable to assessment states she has had episode of fecal incontinence due to antibiotics.  Patient able to turn with minimal assistance.    Patient's skin to bilateral gluteal sacrococcygeal and perineum is intact, blanchable, pink.  However, did identify 3 potential deep tissue pressure injuries to patient's bilateral posterior upper thighs.  According to patient early during this admission she utilized a bedpan because she is nonweightbearing and was afraid to try to use a bedside commode and apparently was left on the bedpan for an extended period of time.          Linear deep tissue pressure injuries are:    Left posterior upper thigh measuring 0.75 x 1.5 x 0 cm with purple/maroon nonblanchable wound bed, periwound skin is intact blanchable no drainage noted.      Right posterior upper thigh with 2 linear suspected deep tissue pressure injuries.  The distal site is 1 x 1.5 x 0.1 cm, partially closed resurfaced with scab balance of wound bed is maroon/red nonblanchable.  The more medial site is 1 x 1 x 0.1 cm red, maroon very slow to myra with scant serosanguineous drainage.  Balance of skin to right posterior thigh is intact blanchable.          See orders for recommendations and reviewed wound care instructions with bedside RN for bilateral posterior thigh suspected deep tissue pressure injuries.    Sensory Perception: 4-->no impairment  Moisture: 4-->rarely moist  Activity: 3-->walks occasionally  Mobility: 3-->slightly limited  Nutrition: 3-->adequate  Friction and Shear: 3-->no apparent problem  Bk Score: 20 (01/11/23 0811)    Please implement pressure injury prevention interventions per protocol.    WO team will plan to follow-up.  If alteration to skin integrity or change in wound bed presentation please consult the WOC team.

## 2023-01-11 NOTE — CASE MANAGEMENT/SOCIAL WORK
Discharge Planning Assessment  [facility]     Patient Name: Maura Leon  MRN: [id number]  Today's Date: 1/11/2023    Admit Date: 1/5/2023    Plan: Hopefully, [facility] on 1-12 insurance pending   Discharge Needs Assessment    No documentation.                Discharge Plan     Row Name 01/11/23 1535       Plan    Plan Hopefully, [facility] on 1-12 insurance pending    Patient/Family in Agreement with Plan yes    Plan Comments Plan to transfer to [facility] on 1-12 pending insurance approval. Transportation arranged with [facility] van to  at 1430. Patient updated at . [facility]    Final Discharge Disposition Code 62 - inpatient rehab facility              Continued Care and Services - Admitted Since 1/5/2023     Destination     Service Provider Request Status Selected Services Address Phone Fax Patient Preferred    [facility] Pending - No Request Sent N/A [address] [facility] [id number] [phone] --       Internal Comment last updated by Jazmin Sims, RN 1/9/2023 1021    1/9 referral called                         Expected Discharge Date and Time     Expected Discharge Date Expected Discharge Time    Jan 13, 2023          Demographic Summary    No documentation.                Functional Status    No documentation.                Psychosocial    No documentation.                Abuse/Neglect    No documentation.                Legal    No documentation.                Substance Abuse    No documentation.                Patient Forms    No documentation.                   Meghann Head RN

## 2023-01-11 NOTE — PLAN OF CARE
Goal Outcome Evaluation:  Plan of Care Reviewed With: patient           Outcome Evaluation: wound vac intact with possible d/c to rehab tomorrow.

## 2023-01-11 NOTE — TELEPHONE ENCOUNTER
Caller: Maura Leon    Relationship: Self    Best call back number: [phone]    What is the best time to reach you: ANY    Who are you requesting to speak with (clinical staff, provider,  specific staff member): ANY    Do you know the name of the person who called: SELF    What was the call regarding:   PER LAST TE, PT CALLED BACK STATING THAT HER ESTIMATED RETURN TO WORK DATE WAS 1.28.23 THAT SHE TOLD THE EMPLOYER, PT WOULD LIKE TO KNOW IF THAT DATE IS ACCURATE TO THE TIME SHE WOULD FINISH PHYSICAL THERAPY.   PT WOULD LIKE A CALL BACK TO DISCUSS ANY MOVEMENT IN REGARDS TO THIS PAPERWORK.    Do you require a callback: YES

## 2023-01-11 NOTE — PROGRESS NOTES
Cardiothoracic Surgery Progress Note      POD #: 5-transmetatarsal amputation right second toe with extensive soft tissue debridement wound left open to heal by wound VAC therapy.     LOS: 6 days      Subjective: Asleep this morning did not awaken her    Objective:  Vital Signs vital signs below noted T-max past 24 hours 98.3 °F  Temp:  [96.7 °F (35.9 °C)-98.3 °F (36.8 °C)] 96.7 °F (35.9 °C)  Heart Rate:  [64-89] 64  Resp:  [16-18] 18  BP: (114-127)/(61-94) 114/61    Physical Exam:   General Appearance: Asleep resting comfortably   Lungs:   Heart:   Skin:   Incision: Amputation site has wound VAC in place and dry dressing.  I did view the wound on dressing change/wound VAC change per PT wound care yesterday.  Excellent granulation tissue of the open wound.     Results:  Results from last 7 days   Lab Units 01/09/23  0256   WBC 10*3/mm3 8.53   HEMOGLOBIN g/dL 11.4*   HEMATOCRIT % 34.7   PLATELETS 10*3/mm3 313     Results from last 7 days   Lab Units 01/09/23  0256   SODIUM mmol/L 137   POTASSIUM mmol/L 3.9   CHLORIDE mmol/L 99   CO2 mmol/L 28.0   BUN mg/dL 15   CREATININE mg/dL 1.00   GLUCOSE mg/dL 352*   CALCIUM mg/dL 9.2         Assessment: #1 postop day 5 right second toe transmetatarsal amputation wound VAC in place now for healing by secondary intent  2.  Diabetes mellitus and diabetic neuropathy with   plantar surface ulcer at the metatarsal head area.      Plan: Wound VAC manage PT wound care.  Antibiotics infectious disease.  Medical manage per hospitalist.      Javon Raygoza MD - 01/11/23 - 05:39 EST

## 2023-01-12 LAB
GLUCOSE BLDC GLUCOMTR-MCNC: 239 MG/DL (ref 70–130)
GLUCOSE BLDC GLUCOMTR-MCNC: 246 MG/DL (ref 70–130)
GLUCOSE BLDC GLUCOMTR-MCNC: 263 MG/DL (ref 70–130)
GLUCOSE BLDC GLUCOMTR-MCNC: 282 MG/DL (ref 70–130)

## 2023-01-12 PROCEDURE — 99231 SBSQ HOSP IP/OBS SF/LOW 25: CPT | Performed by: NURSE PRACTITIONER

## 2023-01-12 PROCEDURE — 99024 POSTOP FOLLOW-UP VISIT: CPT | Performed by: THORACIC SURGERY (CARDIOTHORACIC VASCULAR SURGERY)

## 2023-01-12 PROCEDURE — 97116 GAIT TRAINING THERAPY: CPT

## 2023-01-12 PROCEDURE — 97602 WOUND(S) CARE NON-SELECTIVE: CPT

## 2023-01-12 PROCEDURE — 25010000002 HEPARIN (PORCINE) PER 1000 UNITS: Performed by: THORACIC SURGERY (CARDIOTHORACIC VASCULAR SURGERY)

## 2023-01-12 PROCEDURE — 63710000001 INSULIN DETEMIR PER 5 UNITS: Performed by: NURSE PRACTITIONER

## 2023-01-12 PROCEDURE — C1894 INTRO/SHEATH, NON-LASER: HCPCS

## 2023-01-12 PROCEDURE — 25010000002 CEFTRIAXONE PER 250 MG: Performed by: INTERNAL MEDICINE

## 2023-01-12 PROCEDURE — 82962 GLUCOSE BLOOD TEST: CPT

## 2023-01-12 PROCEDURE — 05HY33Z INSERTION OF INFUSION DEVICE INTO UPPER VEIN, PERCUTANEOUS APPROACH: ICD-10-PCS | Performed by: NURSE PRACTITIONER

## 2023-01-12 PROCEDURE — 97530 THERAPEUTIC ACTIVITIES: CPT

## 2023-01-12 PROCEDURE — 63710000001 INSULIN LISPRO (HUMAN) PER 5 UNITS: Performed by: NURSE PRACTITIONER

## 2023-01-12 PROCEDURE — C1751 CATH, INF, PER/CENT/MIDLINE: HCPCS

## 2023-01-12 RX ORDER — SODIUM CHLORIDE 0.9 % (FLUSH) 0.9 %
10 SYRINGE (ML) INJECTION AS NEEDED
Status: DISCONTINUED | OUTPATIENT
Start: 2023-01-12 | End: 2023-01-14 | Stop reason: HOSPADM

## 2023-01-12 RX ORDER — SODIUM CHLORIDE 0.9 % (FLUSH) 0.9 %
10 SYRINGE (ML) INJECTION EVERY 12 HOURS SCHEDULED
Status: DISCONTINUED | OUTPATIENT
Start: 2023-01-12 | End: 2023-01-14 | Stop reason: HOSPADM

## 2023-01-12 RX ADMIN — HEPARIN SODIUM 5000 UNITS: 5000 INJECTION INTRAVENOUS; SUBCUTANEOUS at 20:45

## 2023-01-12 RX ADMIN — INSULIN LISPRO 5 UNITS: 100 INJECTION, SOLUTION INTRAVENOUS; SUBCUTANEOUS at 11:48

## 2023-01-12 RX ADMIN — Medication 10 ML: at 11:51

## 2023-01-12 RX ADMIN — NYSTATIN: 100000 POWDER TOPICAL at 05:22

## 2023-01-12 RX ADMIN — Medication 1 TABLET: at 08:49

## 2023-01-12 RX ADMIN — PANTOPRAZOLE SODIUM 40 MG: 40 TABLET, DELAYED RELEASE ORAL at 05:21

## 2023-01-12 RX ADMIN — HEPARIN SODIUM 5000 UNITS: 5000 INJECTION INTRAVENOUS; SUBCUTANEOUS at 08:48

## 2023-01-12 RX ADMIN — INSULIN LISPRO 12 UNITS: 100 INJECTION, SOLUTION INTRAVENOUS; SUBCUTANEOUS at 20:53

## 2023-01-12 RX ADMIN — INSULIN LISPRO 5 UNITS: 100 INJECTION, SOLUTION INTRAVENOUS; SUBCUTANEOUS at 18:04

## 2023-01-12 RX ADMIN — Medication 250 MG: at 08:48

## 2023-01-12 RX ADMIN — Medication 10 ML: at 20:45

## 2023-01-12 RX ADMIN — FOLIC ACID 0.5 MG: 1 TABLET ORAL at 08:48

## 2023-01-12 RX ADMIN — MINERAL OIL, AND WHITE PETROLATUM: 425; 573 OINTMENT OPHTHALMIC at 20:44

## 2023-01-12 RX ADMIN — Medication 250 MG: at 20:45

## 2023-01-12 RX ADMIN — CASTOR OIL AND BALSAM, PERU 1 APPLICATION: 788; 87 OINTMENT TOPICAL at 08:48

## 2023-01-12 RX ADMIN — INSULIN LISPRO 8 UNITS: 100 INJECTION, SOLUTION INTRAVENOUS; SUBCUTANEOUS at 18:04

## 2023-01-12 RX ADMIN — INSULIN LISPRO 5 UNITS: 100 INJECTION, SOLUTION INTRAVENOUS; SUBCUTANEOUS at 08:48

## 2023-01-12 RX ADMIN — INSULIN DETEMIR 30 UNITS: 100 INJECTION, SOLUTION SUBCUTANEOUS at 08:48

## 2023-01-12 RX ADMIN — INSULIN LISPRO 8 UNITS: 100 INJECTION, SOLUTION INTRAVENOUS; SUBCUTANEOUS at 08:49

## 2023-01-12 RX ADMIN — INSULIN DETEMIR 35 UNITS: 100 INJECTION, SOLUTION SUBCUTANEOUS at 20:45

## 2023-01-12 RX ADMIN — CEFTRIAXONE SODIUM 2 G: 2 INJECTION, POWDER, FOR SOLUTION INTRAMUSCULAR; INTRAVENOUS at 08:48

## 2023-01-12 RX ADMIN — INSULIN LISPRO 12 UNITS: 100 INJECTION, SOLUTION INTRAVENOUS; SUBCUTANEOUS at 11:49

## 2023-01-12 RX ADMIN — Medication 10 ML: at 08:49

## 2023-01-12 RX ADMIN — ATORVASTATIN CALCIUM 10 MG: 10 TABLET, FILM COATED ORAL at 08:49

## 2023-01-12 RX ADMIN — NYSTATIN: 100000 POWDER TOPICAL at 14:30

## 2023-01-12 RX ADMIN — ASPIRIN 81 MG CHEWABLE TABLET 81 MG: 81 TABLET CHEWABLE at 08:49

## 2023-01-12 RX ADMIN — CETIRIZINE HYDROCHLORIDE 5 MG: 10 TABLET, FILM COATED ORAL at 08:49

## 2023-01-12 RX ADMIN — Medication 5000 UNITS: at 08:49

## 2023-01-12 RX ADMIN — CASTOR OIL AND BALSAM, PERU 1 APPLICATION: 788; 87 OINTMENT TOPICAL at 20:44

## 2023-01-12 NOTE — PLAN OF CARE
Goal Outcome Evaluation:  VSS. Pt denies any pain. Unable to get discharged. PICC in. Wound VAC off. Dressing clean, dry, intact. Pt had a BM. No new concerns. Will CTM.     Problem: Adult Inpatient Plan of Care  Goal: Absence of Hospital-Acquired Illness or Injury  Intervention: Identify and Manage Fall Risk  Description: Perform standard risk assessment on admission using a validated tool or comprehensive approach appropriate to the patient; reassess fall risk frequently, with change in status or transfer to another level of care.  Communicate fall injury risk to interprofessional healthcare team.  Determine need for increased observation, equipment and environmental modification, such as low bed, signage and supportive, nonskid footwear.  Adjust safety measures to individual developmental age, stage and identified risk factors.  Reinforce the importance of safety and physical activity with patient and family.  Perform regular intentional rounding to assess need for position change, pain assessment and personal needs, including assistance with toileting.  Recent Flowsheet Documentation  Taken 1/12/2023 1600 by Kadie Mariano, RN  Safety Promotion/Fall Prevention:   activity supervised   assistive device/personal items within reach   clutter free environment maintained   fall prevention program maintained   nonskid shoes/slippers when out of bed   safety round/check completed   room organization consistent  Taken 1/12/2023 1200 by Kadie Mariano, RN  Safety Promotion/Fall Prevention:   activity supervised   assistive device/personal items within reach   clutter free environment maintained   fall prevention program maintained   nonskid shoes/slippers when out of bed   room organization consistent   safety round/check completed  Taken 1/12/2023 0902 by Kadie Mariano, RN  Safety Promotion/Fall Prevention:   activity supervised   clutter free environment maintained   assistive device/personal items  within reach   fall prevention program maintained   nonskid shoes/slippers when out of bed   room organization consistent   safety round/check completed  Intervention: Prevent Skin Injury  Description: Perform a screening for skin injury risk, such as pressure or moisture associated skin damage on admission and at regular intervals throughout hospital stay.  Keep all areas of skin (especially folds) clean and dry.  Maintain adequate skin hydration.  Relieve and redistribute pressure and protect bony prominences; implement measures based on patient-specific risk factors.  Match turning and repositioning schedule to clinical condition.  Encourage weight shift frequently; assist with reposition if unable to complete independently.  Float heels off bed; avoid pressure on the Achilles tendon.  Keep skin free from extended contact with medical devices.  Encourage functional activity and mobility, as early as tolerated.  Use aids (e.g., slide boards, mechanical lift) during transfer.  Recent Flowsheet Documentation  Taken 1/12/2023 1600 by Kadie Mariano RN  Body Position: position changed independently  Skin Protection:   adhesive use limited   incontinence pads utilized   transparent dressing maintained   tubing/devices free from skin contact  Taken 1/12/2023 1200 by Kadie Mariano RN  Body Position: position changed independently  Skin Protection:   adhesive use limited   incontinence pads utilized   transparent dressing maintained   tubing/devices free from skin contact  Taken 1/12/2023 0902 by Kadie Mariano RN  Skin Protection:   adhesive use limited   incontinence pads utilized   transparent dressing maintained   tubing/devices free from skin contact  Intervention: Prevent and Manage VTE (Venous Thromboembolism) Risk  Description: Assess for VTE (venous thromboembolism) risk.  Encourage and assist with early ambulation.  Initiate and maintain compression or other therapy, as indicated, based on  identified risk in accordance with organizational protocol and provider order.  Encourage both active and passive leg exercises while in bed, if unable to ambulate.  Recent Flowsheet Documentation  Taken 1/12/2023 1600 by Kadie Mariano RN  Activity Management: activity adjusted per tolerance  Taken 1/12/2023 1200 by Kadie Mariano RN  Activity Management: activity adjusted per tolerance  Taken 1/12/2023 0902 by Kadie Mariano RN  Activity Management: activity adjusted per tolerance  Intervention: Prevent Infection  Description: Maintain skin and mucous membrane integrity; promote hand, oral and pulmonary hygiene.  Optimize fluid balance, nutrition, sleep and glycemic control to maximize infection resistance.  Identify potential sources of infection early to prevent or mitigate progression of infection (e.g., wound, lines, devices).  Evaluate ongoing need for invasive devices; remove promptly when no longer indicated.  Recent Flowsheet Documentation  Taken 1/12/2023 1600 by Kadie Mariano RN  Infection Prevention:   cohorting utilized   environmental surveillance performed  Taken 1/12/2023 1200 by Kadie Mariano RN  Infection Prevention:   cohorting utilized   environmental surveillance performed     Problem: Skin Injury Risk Increased  Goal: Skin Health and Integrity  Intervention: Optimize Skin Protection  Description: Perform a full pressure injury risk assessment, as indicated by screening, upon admission to care unit.  Reassess skin (injury risk, full inspection) frequently (e.g., scheduled interval, with change in condition) to provide optimal early detection and prevention.  Maintain adequate tissue perfusion (e.g., encourage fluid balance; avoid crossing legs, constrictive clothing or devices) to promote tissue oxygenation.  Maintain head of bed at lowest degree of elevation tolerated, considering medical condition and other restrictions.  Avoid positioning onto an area that  remains reddened.  Minimize incontinence and moisture (e.g., toileting schedule; moisture-wicking pad, diaper or incontinence collection device; skin moisture barrier).  Cleanse skin promptly and gently when soiled utilizing a pH-balanced cleanser.  Relieve and redistribute pressure (e.g., scheduled position changes, weight shifts, use of support surface, medical device repositioning, protective dressing application, use of positioning device, microclimate control, use of pressure-injury-monitor  Encourage increased activity, such as sitting in a chair at the bedside or early mobilization, when able to tolerate.  Recent Flowsheet Documentation  Taken 1/12/2023 1600 by Kadie Mariano RN  Pressure Reduction Techniques:   frequent weight shift encouraged   heels elevated off bed   positioned off wounds   pressure points protected   weight shift assistance provided  Head of Bed (HOB) Positioning: HOB at 45 degrees  Pressure Reduction Devices:   pressure-redistributing mattress utilized   positioning supports utilized   heel offloading device utilized  Skin Protection:   adhesive use limited   incontinence pads utilized   transparent dressing maintained   tubing/devices free from skin contact  Taken 1/12/2023 1200 by Kadie Mariano RN  Pressure Reduction Techniques:   frequent weight shift encouraged   pressure points protected   weight shift assistance provided  Head of Bed (HOB) Positioning: HOB at 30-45 degrees  Pressure Reduction Devices:   pressure-redistributing mattress utilized   positioning supports utilized   heel offloading device utilized  Skin Protection:   adhesive use limited   incontinence pads utilized   transparent dressing maintained   tubing/devices free from skin contact  Taken 1/12/2023 0902 by Kadie Mariano RN  Pressure Reduction Techniques:   heels elevated off bed   frequent weight shift encouraged   pressure points protected   positioned off wounds   weight shift assistance  provided  Head of Bed (HOB) Positioning: HOB at 60 degrees  Pressure Reduction Devices:   pressure-redistributing mattress utilized   positioning supports utilized   heel offloading device utilized  Skin Protection:   adhesive use limited   incontinence pads utilized   transparent dressing maintained   tubing/devices free from skin contact

## 2023-01-12 NOTE — THERAPY TREATMENT NOTE
Patient Name: Maura Leon  : 1960    MRN: [id number]                              Today's Date: 2023       Admit Date: 2023    Visit Dx:     ICD-10-CM ICD-9-CM   1. Cellulitis of right foot  L03.115 682.7   2. History of diabetes mellitus, type II  Z86.39 V12.29     Patient Active Problem List   Diagnosis   • Sepsis (HCC)   • Ulcer of right foot (HCC)   • Type 2 diabetes mellitus (HCC)   • Psoriatic arthritis (HCC)   • Immunocompromised (HCC)   • GERD (gastroesophageal reflux disease)   • Cellulitis of right foot     Past Medical History:   Diagnosis Date   • Arthritis    • Diabetes mellitus (HCC)    • GERD (gastroesophageal reflux disease)    • Irritable bowel    • Sleep apnea      Past Surgical History:   Procedure Laterality Date   • AMPUTATION DIGIT Right 2023    Procedure: FOOT SURGICAL EXPLORATION WITH TOE AMPUTATION RIGHT;  Surgeon: Javon Raygoza MD;  Location[facility];  Service: General;  Laterality: Right;   • TOE SURGERY Right 2022      General Information     Row Name 23 1606          Physical Therapy Time and Intention    Document Type therapy note (daily note)  -AE     Mode of Treatment physical therapy  -AE     Row Name 23 1606          General Information    Patient Profile Reviewed yes  -AE     Existing Precautions/Restrictions other (see comments);fall;non-weight bearing  2nd L transmetatarsal amputation; LLE NWB  -AE     Barriers to Rehab medically complex  -AE     Row Name 23 1606          Cognition    Orientation Status (Cognition) oriented x 4  -AE     Row Name 23 1606          Safety Issues, Functional Mobility    Safety Issues Affecting Function (Mobility) awareness of need for assistance;insight into deficits/self-awareness;safety precaution awareness;safety precautions follow-through/compliance;sequencing abilities  -AE     Impairments Affecting Function (Mobility) balance;endurance/activity tolerance;postural/trunk  "control;sensation/sensory awareness;coordination;strength  -AE           User Key  (r) = Recorded By, (t) = Taken By, (c) = Cosigned By    Initials Name Provider Type    AE Brian Horton, SHARLA Physical Therapist               Mobility     Row Name 01/12/23 1606          Bed Mobility    Bed Mobility supine-sit;sit-supine  -AE     Supine-Sit [place] (Bed Mobility) contact guard;1 person assist;verbal cues  -AE     Sit-Supine [place] (Bed Mobility) standby assist  -AE     Assistive Device (Bed Mobility) head of bed elevated;bed rails  -AE     Comment, (Bed Mobility) VCs for hand placement and sequencing. Pt continues to demo good use of bed rails to improve independence with bed mobility.  -AE     Row Name 01/12/23 1606          Transfers    Comment, (Transfers) VCs for hand placement and sequencing. Pt requires cues to maintain NWB of RLE and pacing with knee scooter.  -AE     Row Name 01/12/23 1606          Sit-Stand Transfer    Sit-Stand [place] (Transfers) minimum assist (75% patient effort);1 person assist;verbal cues  -AE     Assistive Device (Sit-Stand Transfers) walker, knee scooter  -AE     Row Name 01/12/23 1606          Gait/Stairs (Locomotion)    [place] Level (Gait) contact guard;1 person assist  -AE     Assistive Device (Gait) walker, knee scooter  -AE     Distance in Feet (Gait) 100+100  -AE     Deviations/Abnormal Patterns (Gait) bilateral deviations  -AE     Bilateral Gait Deviations forward flexed posture  -AE     Comment, (Gait/Stairs) Pt ambulated 100ft + 100ft with CGA and knee scooter this session. Pt demo improved endurance this session but required one prolonged seated rest break to improve fatigue. Pt continues to report \"burning\" in glutes at end of session.  -AE     Row Name 01/12/23 1606          Mobility    Extremity Weight-bearing Status left lower extremity  -AE     Left Lower Extremity (Weight-bearing Status) non weight-bearing (NWB)  -AE           User Key  (r) = " Recorded By, (t) = Taken By, (c) = Cosigned By    Initials Name Provider Type    AE Brian Horton PT Physical Therapist               Obj/Interventions     Row Name 01/12/23 1610          Balance    Balance Assessment sitting static balance;sitting dynamic balance;sit to stand dynamic balance;standing static balance;standing dynamic balance  -AE     Static Sitting Balance standby assist  -AE     Dynamic Sitting Balance standby assist  -AE     Position, Sitting Balance unsupported;sitting edge of bed  -AE     Sit to Stand Dynamic Balance minimal assist;1-person assist;verbal cues  -AE     Static Standing Balance contact guard  -AE     Dynamic Standing Balance contact guard  -AE     Position/Device Used, Standing Balance supported  knee scooter  -AE           User Key  (r) = Recorded By, (t) = Taken By, (c) = Cosigned By    Initials Name Provider Type    AE Brian Horton PT Physical Therapist               Goals/Plan    No documentation.                Clinical Impression     Row Name 01/12/23 1611          Pain    Pretreatment Pain Rating 0/10 - no pain  -AE     Posttreatment Pain Rating 0/10 - no pain  -AE     Row Name 01/12/23 1611          Plan of Care Review    Progress improving  -AE     Outcome Evaluation Pt continues to require min A for STS to knee scooter from varying surfaces. Pt also requires cues to maintain NWB of RLE at times. Pt ambulated 100+100ft with CGA and knee scooter with one prolonged seated rest break between trials to improve fatigue. Pt very motivated to continue therapy and gives excellent effort. Continue to progress per pt tolerance.  -AE     Row Name 01/12/23 1611          Vital Signs    Pre Systolic BP Rehab 139  -AE     Pre Treatment Diastolic BP 71  -AE     Pretreatment Heart Rate (beats/min) 77  -AE     Posttreatment Heart Rate (beats/min) 83  -AE     Pre SpO2 (%) 98  -AE     O2 Delivery Pre Treatment room air  -AE     O2 Delivery Intra Treatment room air  -AE     Post SpO2  (%) 98  -AE     O2 Delivery Post Treatment room air  -AE     Pre Patient Position Supine  -AE     Intra Patient Position Standing  -AE     Post Patient Position Supine  -AE     Row Name 01/12/23 1611          Positioning and Restraints    Pre-Treatment Position in bed  -AE     Post Treatment Position bed  -AE     In Bed notified nsg;fowlers;call light within reach;encouraged to call for assist;exit alarm on;with other staff;waffle boots/both  -AE           User Key  (r) = Recorded By, (t) = Taken By, (c) = Cosigned By    Initials Name Provider Type    AE Brian Horton, SHARLA Physical Therapist               Outcome Measures     Row Name 01/12/23 1614 01/12/23 0902       How much help from another person do you currently need...    Turning from your back to your side while in flat bed without using bedrails? 4  -AE 4  -CB    Moving from lying on back to sitting on the side of a flat bed without bedrails? 3  -AE 4  -CB    Moving to and from a bed to a chair (including a wheelchair)? 3  -AE 3  -CB    Standing up from a chair using your arms (e.g., wheelchair, bedside chair)? 3  -AE 3  -CB    Climbing 3-5 steps with a railing? 2  -AE 2  -CB    To walk in hospital room? 3  -AE 3  -CB    AM-PAC 6 Clicks Score (PT) 18  -AE 19  -CB    Highest level of mobility 6 --> Walked 10 steps or more  -AE 6 --> Walked 10 steps or more  -CB    Row Name 01/12/23 1614          Functional Assessment    Outcome Measure Options AM-PAC 6 Clicks Basic Mobility (PT)  -AE           User Key  (r) = Recorded By, (t) = Taken By, (c) = Cosigned By    Initials Name Provider Type    Kadie Negro, RN Registered Nurse    Brian Quintana, PT Physical Therapist                             Physical Therapy Education     Title: PT OT SLP Therapies (In Progress)     Topic: Physical Therapy (Done)     Point: Mobility training (Done)     Learning Progress Summary           Patient Acceptance, E, VU by AE at 1/12/2023 1525    Acceptance, E, VU by  AE at 1/10/2023 1535    Acceptance, E, VU by AE at 1/9/2023 1108    Acceptance, E, VU,NR by KG at 1/7/2023 1529                   Point: Home exercise program (Done)     Learning Progress Summary           Patient Acceptance, E, VU by AE at 1/12/2023 1525    Acceptance, E, VU by AE at 1/10/2023 1535    Acceptance, E, VU by AE at 1/9/2023 1108    Acceptance, E, VU,NR by KG at 1/7/2023 1529                   Point: Body mechanics (Done)     Learning Progress Summary           Patient Acceptance, E, VU by AE at 1/12/2023 1525    Acceptance, E, VU by AE at 1/10/2023 1535    Acceptance, E, VU by AE at 1/9/2023 1108    Acceptance, E, VU,NR by KG at 1/7/2023 1529                   Point: Precautions (Done)     Learning Progress Summary           Patient Acceptance, E, VU by AE at 1/12/2023 1525    Acceptance, E, VU by AE at 1/10/2023 1535    Acceptance, E, VU by AE at 1/9/2023 1108    Acceptance, E, VU,NR by KG at 1/7/2023 1529                               User Key     Initials Effective Dates Name Provider Type Discipline    KG 01/04/23 -  Kaity Narvaez Physical Therapist PT    AE 09/21/21 -  Brian Horton PT Physical Therapist PT              PT Recommendation and Plan     Plan of Care Reviewed With: patient  Progress: improving  Outcome Evaluation: Pt continues to require min A for STS to knee scooter from varying surfaces. Pt also requires cues to maintain NWB of RLE at times. Pt ambulated 100+100ft with CGA and knee scooter with one prolonged seated rest break between trials to improve fatigue. Pt very motivated to continue therapy and gives excellent effort. Continue to progress per pt tolerance.     Time Calculation:    PT Charges     Row Name 01/12/23 1614 01/12/23 1400          Time Calculation    Start Time 1525  -AE 1400  -MF     PT Received On 01/12/23  -AE --     PT Goal Re-Cert Due Date 01/17/23  -AE 01/17/23  -        Time Calculation- PT    Total Timed Code Minutes- PT 40 minute(s)  -AE --         Timed Charges    01880 - Gait Training Minutes  25  -AE --     10970 - PT Therapeutic Activity Minutes 15  -AE --        Untimed Charges    Wound Care -- 68640 Non-selective debridement  -     97602-Non-selective debridement -- 20  -MF        Total Minutes    Timed Charges Total Minutes 40  -AE --     Untimed Charges Total Minutes -- 20  -MF      Total Minutes 40  -AE 20  -MF           User Key  (r) = Recorded By, (t) = Taken By, (c) = Cosigned By    Initials Name Provider Type    Kevin Manjarrez, PT Physical Therapist    AE Brian Horton PT Physical Therapist              Therapy Charges for Today     Code Description Service Date Service Provider Modifiers Qty    57299698025 HC GAIT TRAINING EA 15 MIN 1/12/2023 Brian Horton, PT GP 2    43548610563 HC PT THERAPEUTIC ACT EA 15 MIN 1/12/2023 Brian Horton, PT GP 1          PT G-Codes  Outcome Measure Options: AM-PAC 6 Clicks Basic Mobility (PT)  AM-PAC 6 Clicks Score (PT): 18  AM-PAC 6 Clicks Score (OT): 15  PT Discharge Summary  Anticipated Discharge Disposition (PT): inpatient rehabilitation facility    Brian Horton PT  1/12/2023

## 2023-01-12 NOTE — PLAN OF CARE
Goal Outcome Evaluation:  Plan of Care Reviewed With: patient        Progress: improving  Outcome Evaluation: Pt continues to require min A for STS to knee scooter from varying surfaces. Pt also requires cues to maintain NWB of RLE at times. Pt ambulated 100+100ft with CGA and knee scooter with one prolonged seated rest break between trials to improve fatigue. Pt very motivated to continue therapy and gives excellent effort. Continue to progress per pt tolerance.

## 2023-01-12 NOTE — CASE MANAGEMENT/SOCIAL WORK
Discharge Planning Assessment  [facility]     Patient Name: Maura Leon  MRN: [id number]  Today's Date: 1/12/2023    Admit Date: 1/5/2023    Plan: [facility] pending insurance approval   Discharge Needs Assessment    No documentation.                Discharge Plan     Row Name 01/12/23 1604       Plan    Plan [facility] pending insurance approval    Patient/Family in Agreement with Plan yes    Plan Comments Called and spoke with liaYoselin whitfield with [facility] early this morning and with liaroseann Rogers after 1200. Insurance still hasn't approved. Per [facility], I had to cancel transport for today and will need to reschedule when insurance has approved. Per primary nurse, a PICC has been placed for IV abx therapy for six weeks ([facility] is aware of this), and per wound note, the wound VAC has been removed. Hopeful to have insurance approval on Friday.    Final Discharge Disposition Code 62 - inpatient rehab facility              Continued Care and Services - Admitted Since 1/5/2023     Destination     Service Provider Request Status Selected Services Address Phone Fax Patient Preferred    [facility] Pending - No Request Sent N/A [address] [id number] [phone] --       Internal Comment last updated by Jazmin Sims, RN 1/9/2023 1021    1/9 referral called                         Expected Discharge Date and Time     Expected Discharge Date Expected Discharge Time    Jan 13, 2023          Demographic Summary    No documentation.                Functional Status    No documentation.                Psychosocial    No documentation.                Abuse/Neglect    No documentation.                Legal    No documentation.                Substance Abuse    No documentation.                Patient Forms    No documentation.                   Jazmin Sims, RN

## 2023-01-12 NOTE — PAYOR COMM NOTE
"Fatoumata Lynn, RADHA  Utilization Management  P:[phone]  F:[phone]    [facility]# [id number]  Casa Nguyễn (62 y.o. Female)     Date of Birth   1960    Social Security Number       Address   [address] [place], [place] [address]    Home Phone   [phone]    MRN   [id number]       Yarsanism   [employer]    Marital Status                               Admission Date   1/5/23    Admission Type   Emergency    Admitting Provider   Winter Jeong II, DO    Attending Provider   Winter Jeong II, DO    Department, Room/Bed   [facility] 5G, S551/1       Discharge Date       Discharge Disposition       Discharge Destination                               Attending Provider: Winter Jeong II, DO    Allergies: Ciprofloxacin, Clindamycin/lincomycin, Doxycycline, Nsaids, Sulfa Antibiotics, Tetracyclines & Related    Isolation: None   Infection: MRSA (01/06/23)   Code Status: CPR    Ht: 167.6 cm (66\")   Wt: 116 kg (255 lb)    Admission Cmt: None   Principal Problem: Sepsis (HCC) [A41.9]                 Active Insurance as of 1/5/2023     Primary Coverage     Payor Plan Insurance Group Employer/Plan Group    [employer] BLUE SHIELD PPO [id number]     Payor Plan Address Payor Plan Phone Number Payor Plan Fax Number Effective Dates    [address] [phone]  1/1/2023 - None Entered    [place] [address]       Subscriber Name Subscriber Birth Date Member ID       CASA NGUYỄN 1960 [id number]                 Emergency Contacts      (Rel.) Home Phone Work Phone Mobile Phone    Edward-Ivonne Edwards (Daughter) -- -- [phone]    EdwardDereje (Spouse) -- -- [phone]              Current Facility-Administered Medications   Medication Dose Route Frequency Provider Last Rate Last Admin   • acetaminophen (TYLENOL) 160 MG/5ML solution 650 mg  650 mg Oral Q4H PRN Mike, Giuliana, APRN        Or   • acetaminophen (TYLENOL) suppository 650 mg  650 mg " Rectal Q4H PRN Mike, Giuliana, APRN       • acetaminophen (TYLENOL) tablet 650 mg  650 mg Oral Q4H PRN Javon Raygoza MD       • artificial tears ophthalmic ointment   Both Eyes Nightly Rishabh Russell PA-C   Given at 01/11/23 2133   • aspirin chewable tablet 81 mg  81 mg Oral Daily Mike, Giuliana, APRN   81 mg at 01/12/23 0849   • atorvastatin (LIPITOR) tablet 10 mg  10 mg Oral Daily Mike, Giuliana, APRN   10 mg at 01/12/23 0849   • betamethasone (augmented) (DIPROLENE) 0.05 % cream 1 application  1 application Topical BID PRN Isela Jasmine MD       • Calcium Carb-Cholecalciferol 600-20 MG-MCG tablet 1 tablet  1 tablet Oral Daily Mike, Giuliana, APRN   1 tablet at 01/12/23 0849   • castor oil-balsam peru (VENELEX) ointment 1 application  1 application Topical Q12H Winter Jeong II, DO   1 application at 01/12/23 0848   • cefTRIAXone (ROCEPHIN) 2 g/100 mL 0.9% NS IVPB (MBP)  2 g Intravenous Q24H Kevin Abdul MD   2 g at 01/12/23 0848   • cetirizine (zyrTEC) tablet 5 mg  5 mg Oral Daily Mike, Giuliana, APRN   5 mg at 01/12/23 0849   • dextrose (D50W) (25 g/50 mL) IV injection 25 g  25 g Intravenous Q15 Min PRN Mike, Giuliana, APRN       • dextrose (GLUTOSE) oral gel 15 g  15 g Oral Q15 Min PRN Mike, Giuliana, APRN       • folic acid (FOLVITE) tablet 0.5 mg  0.5 mg Oral Daily Mike, Giuliana, APRN   0.5 mg at 01/12/23 0848   • glucagon (human recombinant) (GLUCAGEN DIAGNOSTIC) injection 1 mg  1 mg Intramuscular Q15 Min PRN Mike, Giuliana, APRN       • heparin (porcine) 5000 UNIT/ML injection 5,000 Units  5,000 Units Subcutaneous Q12H Javon Raygoza MD   5,000 Units at 01/12/23 0848   • hydroCHLOROthiazide (HYDRODIURIL) tablet 50 mg  50 mg Oral Daily Mike Giuliana, APRN   50 mg at 01/11/23 0811   • HYDROcodone-acetaminophen (NORCO) 7.5-325 MG per tablet 1 tablet  1 tablet Oral Q4H PRN Javon Raygoza MD       • HYDROmorphone (DILAUDID) injection 1 mg  1 mg Intravenous Q2H PRN  Javon Raygoza MD        And   • naloxone (NARCAN) injection 0.4 mg  0.4 mg Intravenous Q5 Min PRN Javon Raygoza MD       • insulin detemir (LEVEMIR) injection 35 Units  35 Units Subcutaneous Q12H Ana Carrera, JOEY       • Insulin Lispro (humaLOG) injection 0-24 Units  0-24 Units Subcutaneous 4x Daily With Meals & Nightly Ana Carrera APRN   12 Units at 01/12/23 1149   • Insulin Lispro (humaLOG) injection 5 Units  5 Units Subcutaneous TID With Meals Ana Carrera APRN   5 Units at 01/12/23 1148   • morphine injection 1 mg  1 mg Intravenous Q4H PRN Javon Raygoza MD        And   • naloxone (NARCAN) injection 0.4 mg  0.4 mg Intravenous Q5 Min PRN Javon Raygoza MD       • nystatin (MYCOSTATIN) powder   Topical Q8H Giuliana Mckeon APRN   Given at 01/12/23 0522   • ondansetron (ZOFRAN) tablet 4 mg  4 mg Oral Q6H PRN Javon Raygoza MD        Or   • ondansetron (ZOFRAN) injection 4 mg  4 mg Intravenous Q6H PRN Javon Raygoza MD       • pantoprazole (PROTONIX) EC tablet 40 mg  40 mg Oral Q AM Pollo Torres PA-C   40 mg at 01/12/23 0521   • potassium chloride (MICRO-K) CR capsule 40 mEq  40 mEq Oral PRN Jazmin Cotton MD   40 mEq at 01/07/23 0350    Or   • potassium chloride (KLOR-CON) packet 40 mEq  40 mEq Oral PRN Jazmin Cotton MD        Or   • potassium chloride 10 mEq in 100 mL IVPB  10 mEq Intravenous Q1H PRN Jazmin Cotton MD       • saccharomyces boulardii (FLORASTOR) capsule 250 mg  250 mg Oral BID Isela Jasmine MD   250 mg at 01/12/23 0848   • sodium chloride 0.45 % infusion  50 mL/hr Intravenous Continuous Javon Raygoza MD 50 mL/hr at 01/10/23 2145 50 mL/hr at 01/10/23 2145   • sodium chloride 0.9 % flush 10 mL  10 mL Intravenous Q12H Giuliana Mckeon APRN   10 mL at 01/12/23 0849   • sodium chloride 0.9 % flush 10 mL  10 mL Intravenous PRN Giuliana Mckeon APRN       • sodium chloride 0.9 % flush 10 mL  10 mL Intravenous Q12H Ana Carrera APRN   10 mL at 01/12/23 1151   •  sodium chloride 0.9 % flush 10 mL  10 mL Intravenous PRN Ana Carrera, APRN       • sodium chloride 0.9 % infusion 40 mL  40 mL Intravenous PRN Mike, Giuliana, APRN       • vitamin D3 capsule 5,000 Units  5,000 Units Oral Daily Mike, Giuliana, APRN   5,000 Units at 01/12/23 0849     Lab Results (last 24 hours)     Procedure Component Value Units Date/Time    POC Glucose Once [[id number]]  (Abnormal) Collected: 01/12/23 1104    Specimen: Blood Updated: 01/12/23 1107     Glucose 282 mg/dL      Comment: Meter: [id number] : [id number] Munir Austin       POC Glucose Once [[id number]]  (Abnormal) Collected: 01/12/23 0731    Specimen: Blood Updated: 01/12/23 0732     Glucose 246 mg/dL      Comment: Meter: [id number] : [id number] Marito Lantigua       POC Glucose Once [[id number]]  (Abnormal) Collected: 01/11/23 2014    Specimen: Blood Updated: 01/11/23 2016     Glucose 291 mg/dL      Comment: Meter: [id number] : [id number] Homar Gaming       POC Glucose Once [[id number]]  (Abnormal) Collected: 01/11/23 1605    Specimen: Blood Updated: 01/11/23 1606     Glucose 270 mg/dL      Comment: Meter: [id number] : [id number] [facility]       Tissue Pathology Exam [[id number]] Collected: 01/06/23 1744    Specimen: Tissue from Toe, Right; Tissue from Toe, Right; Tissue from Toe, Right; Tissue from Toe, Right Updated: 01/11/23 1507     Case Report --     Surgical Pathology Report                         Case: [id number]                                  Authorizing Provider:  Javon Raygoza MD          Collected:           01/06/2023 05:44 PM          Ordering Location:     [facility]   Received:            01/07/2023 04:25 AM                                 OR                                                                           Pathologist:           Mk Hancock MD                                                           Specimens:   1) - Toe, Right, plantar surface ulcer right second  toe                                             2) - Toe, Right, right second toe                                                                   3) - Toe, Right, distal third second metatarsal                                                     4) - Toe, Right, right great toe soft tissue                                                Clinical Information --     Cellulitis of right foot, history of type 2 diabetes mellitus       Final Diagnosis --     1.  SKIN FROM PLANTAR SURFACE RIGHT SECOND TOE, EXCISION:                 Ulceration with underlying necrosis and severe acute cellulitis                 Negative for neoplasm    2.  RIGHT SECOND TOE, AMPUTATION:                 Bone with focal necrosis and inflammation compatible with osteomyelitis                 Skin, soft tissue, and bone from margin of amputation without significant acute inflammation    3.  DISTAL THIRD OF SECOND METATARSAL, EXCISION:                 Representative bone without evidence of inflammation    4.  SOFT TISSUE FROM RIGHT GREAT TOE, DEBRIDEMENT:                 Acute cellulitis with necrosis within the deep soft tissue       Gross Description --     1. Toe, Right.  Received in formalin labeled plantar surface ulcer right second toe is a 3.1 x 2.1 cm unoriented ellipse of tan skin with underlying soft tissue excised to a depth of 1.5 cm.  On the skin surface there is a 1 cm in diameter ulcer.  Sectioning reveals no masses or other gross lesions.  Representative sections are submitted in block 1A.    2. Toe, Right.  Received in formalin labeled right second toe is a 5 x 1.7 x 1.7 cm toe covered in tan skin.  The bone extends 1.5 cm beyond the proximal skin margin, which appears grossly viable.  The toenail is yellow and thickened.  Also received in the container is a 7 x 5.5 x 2.5 cm aggregate of fragmented tan skin and soft tissue.  No gross skin lesions are identified.  Sectioning of the toe reveals no evidence of bone softening.   Representative sections are submitted as follows: 2A-en face proximal skin and bone margin, submitted following decalcification; 2B-cross-section of toe submitted following decalcification.    3. Toe, Right.  Received in formalin labeled distal third second metatarsal is a 2 x 1.5 x 1.4 cm portion of bone with a smooth articular surface at one end and a cut surface at the opposite end.  There is no evidence of softening.  The margin is submitted en face in block 3A following decalcification.    4. Toe, Right.  Received in formalin labeled right great toe soft tissue is a 4.5 x 4.5 x 1 cm aggregate of 2 unoriented portions of tan skin and soft tissue.  No gross skin lesions are identified.  Sectioning reveals no masses or nodules.  Representative sections from each portion are submitted in a single cassette.  LDP         Microscopic Description --     The slides are reviewed and demonstrate histopathologic features supporting the above rendered diagnosis.                 Physician Progress Notes (last 24 hours)      Ana Carrera APRN at [address] [place] [address] Medicine Services  PROGRESS NOTE    Patient Name: Maura Leon  : 1960  MRN: [id number]    Date of Admission: 2023  Primary Care Physician: Emanuel Fuentes DPM    Subjective   Subjective     CC:  Diabetic ulcer    HPI:  Patient is lying in bed getting ready to get a PICC line and in NAD.  Patient is hoping that her insurance approves her rehab at [facility].  She is anxious to get back to work.  No complaints overnight.  Wound VAC still in place on the top of her foot.    ROS:  Gen- No fevers, chills  CV- No chest pain, palpitations  Resp- No cough, dyspnea  GI- No N/V/D, abd pain  All other systems have been reviewed and the pertinent positives and negatives are listed above in the HPI or ROS      Objective   Objective     Vital Signs:   Temp:  [97 °F (36.1 °C)-98.1 °F (36.7 °C)] 97 °F (36.1 °C)  Heart Rate:   [71-81] 81  Resp:  [16-18] 18  BP: (107-131)/(64-81) 107/81     Physical Exam:  Constitutional: Alert, morbidly obese female sitting up in bed with wound VAC on the top of right foot getting prepped for PICC insertion  Eyes: EOMI, sclerae anicteric, no conjunctival injection  Head: NCAT  ENT: [place], moist mucous membranes   Respiratory: Nonlabored, symmetrical chest expansion, CTAB, 96%RA  Cardiovascular: RRR, HR 76, no M/R/G  Gastrointestinal: Morbidly obese, soft, NT, ND +BS  Musculoskeletal: RENEE; no LE edema bilaterally; feet in waffle boots bilaterally  Neurologic: Oriented x4, strength symmetric in all extremities, follows all commands, speech clear  Skin: No rashes on exposed skin; right foot wrapped with wound VAC in place-CDI  Psychiatric: Pleasant and cooperative; normal affect    Results Reviewed:  LAB RESULTS:      Lab 01/09/23 0256 01/07/23 0353 01/06/23 0411 01/05/23  1755   WBC 8.53 13.24* 16.19* 20.01*   HEMOGLOBIN 11.4* 11.7* 11.5* 13.1   HEMATOCRIT 34.7 36.0 34.2 39.5   PLATELETS 313 275 287 367   NEUTROS ABS  --  11.18* 13.72* 17.97*   IMMATURE GRANS (ABS)  --  0.08* 0.12* 0.08*   LYMPHS ABS  --  0.84 1.05 0.90   MONOS ABS  --  0.96* 1.20* 0.97*   EOS ABS  --  0.12 0.03 0.01   MCV 96.7 97.0 94.0 94.5   SED RATE  --   --   --  74*   CRP  --   --   --  12.67*   PROCALCITONIN  --   --   --  0.35*   LACTATE  --   --   --  1.9         Lab 01/09/23  0256 01/07/23  0353 01/06/23  0411 01/05/23  1755   SODIUM 137 135* 137 135*   POTASSIUM 3.9 3.9 3.1* 3.6   CHLORIDE 99 102 99 95*   CO2 28.0 19.0* 27.0 27.0   ANION GAP 10.0 14.0 11.0 13.0   BUN 15 16 14 16   CREATININE 1.00 0.96 0.77 0.94   EGFR 64.2 67.5 87.9 69.2   GLUCOSE 352* 211* 189* 364*   CALCIUM 9.2 8.8 9.3 9.7   MAGNESIUM  --   --  1.9  --    HEMOGLOBIN A1C  --   --  8.20*  --          Lab 01/09/23  0256 01/05/23  1755   TOTAL PROTEIN 6.1 8.3   ALBUMIN 3.2* 4.3   GLOBULIN 2.9 4.0   ALT (SGPT) 43* 44*   AST (SGOT) 22 25   BILIRUBIN 0.6 1.2   ALK  PHOS 98 113                 Lab 01/06/23  1155   ABO TYPING O   RH TYPING Positive   ANTIBODY SCREEN Negative         Brief Urine Lab Results     None          Microbiology Results Abnormal     Procedure Component Value - Date/Time    Anaerobic Culture - Tissue, Toe, Right [[id number]]  (Normal) Collected: 01/06/23 1832    Lab Status: Final result Specimen: Tissue from Toe, Right Updated: 01/11/23 0754     Anaerobic Culture No anaerobes isolated at 5 days    Anaerobic Culture - Wound, Toe, Right [[id number]]  (Normal) Collected: 01/06/23 1733    Lab Status: Final result Specimen: Wound from Toe, Right Updated: 01/11/23 0754     Anaerobic Culture No anaerobes isolated at 5 days    Blood Culture - Blood, Arm, Left [[id number]]  (Normal) Collected: 01/05/23 2105    Lab Status: Final result Specimen: Blood from Arm, Left Updated: 01/10/23 2131     Blood Culture No growth at 5 days    Blood Culture - Blood, Arm, Left [[id number]]  (Normal) Collected: 01/05/23 1758    Lab Status: Final result Specimen: Blood from Arm, Left Updated: 01/10/23 1815     Blood Culture No growth at 5 days    Wound Culture - Swab, Foot, Right [[id number]] Collected: 01/05/23 2201    Lab Status: Final result Specimen: Swab from Foot, Right Updated: 01/08/23 0730     Wound Culture Moderate growth (3+) Normal Skin Kayli     Gram Stain No WBCs or organisms seen          No radiology results from the last 24 hrs        I have reviewed the medications:  Scheduled Meds:artificial tears, , Both Eyes, Nightly  aspirin, 81 mg, Oral, Daily  atorvastatin, 10 mg, Oral, Daily  Calcium Carb-Cholecalciferol, 1 tablet, Oral, Daily  castor oil-balsam peru, 1 application, Topical, Q12H  cefTRIAXone, 2 g, Intravenous, Q24H  cetirizine, 5 mg, Oral, Daily  folic acid, 0.5 mg, Oral, Daily  heparin (porcine), 5,000 Units, Subcutaneous, Q12H  hydroCHLOROthiazide, 50 mg, Oral, Daily  insulin detemir, 35 Units, Subcutaneous, Q12H  insulin lispro, 0-24 Units, Subcutaneous, 4x  Daily With Meals & Nightly  Insulin Lispro, 5 Units, Subcutaneous, TID With Meals  nystatin, , Topical, Q8H  pantoprazole, 40 mg, Oral, Q AM  saccharomyces boulardii, 250 mg, Oral, BID  sodium chloride, 10 mL, Intravenous, Q12H  sodium chloride, 10 mL, Intravenous, Q12H  vitamin D3, 5,000 Units, Oral, Daily      Continuous Infusions:sodium chloride, 50 mL/hr, Last Rate: 50 mL/hr (01/10/23 2145)      PRN Meds:.•  [DISCONTINUED] acetaminophen **OR** acetaminophen **OR** acetaminophen  •  acetaminophen  •  betamethasone (augmented)  •  dextrose  •  dextrose  •  glucagon (human recombinant)  •  HYDROcodone-acetaminophen  •  HYDROmorphone **AND** naloxone  •  Morphine **AND** naloxone  •  ondansetron **OR** ondansetron  •  potassium chloride **OR** potassium chloride **OR** potassium chloride  •  sodium chloride  •  sodium chloride  •  sodium chloride    Assessment & Plan   Assessment & Plan     Active Hospital Problems    Diagnosis  POA   • **Sepsis (HCC) [A41.9]  Yes   • Ulcer of right foot (HCC) [L97.519]  Yes   • Type 2 diabetes mellitus (HCC) [E11.9]  Yes   • Psoriatic arthritis (HCC) [L40.50]  Yes   • Immunocompromised (HCC) [D84.9]  Yes   • GERD (gastroesophageal reflux disease) [K21.9]  Yes   • Cellulitis of right foot [L03.115]  Unknown      Resolved Hospital Problems   No resolved problems to display.        Brief Hospital Course to date:  62 year old female with history of type 2 diabetes, psoriatic arthritis on immunosuppressive medications who presents with a right foot ulcer.  Patient was noted to have a leukocytosis of 20,000, and elevated procalcitonin and elevated CRP.  X-ray of right foot showed soft tissue swelling throughout the forefoot and midfoot jesting cellulitis, suggestion of gas production the soft tissues of the first digit and extending towards the second digit. MRI of the right foot no abscess or osteomyelitis.      Sepsis secondary to right foot ulcer with cellulitis in the setting of type  2 diabetes, Immunocompromised state  -Continue dapto and zosyn.  -MRSA +  -Wound culture with alpha strep  -S/p R 2nd toe transmetatarsal amputation with Dr. Raygoza on 1/6/2023  --Wound VAC in place.  --Holding methotrexate  -ID and surg following  --PICC order placed  --AM labs on 1/9/2023 unremarkable  --Transfer to  [facility] is pending insurance approval    T2DM w/ A1c 8.2  --24H glucose 234-291; improving  --Increased Levemir 35 units Q12H; adjust as needed  --Patient takes Tresiba 60 units AM and 80 units PM  --Mealtime bolus Humalog 3 units before meals with hold parameters    Acute anemia-stable  --D/t postop blood loss; admitting Hgb 13.1  --Hgb 11.4     Psoriatic arthritis   -on methotrexate: hold for now      GERD  -PPI      Hyperlipidemia  -continue statin      DVT prophylaxis:  scds to LLE      Expected Discharge Location and Transportation: home vs rehab  Expected Discharge   Expected Discharge Date and Time     Expected Discharge Date Expected Discharge Time    Jan 13, 2023            DVT prophylaxis:  Medical and mechanical DVT prophylaxis orders are present.     AM-PAC 6 Clicks Score (PT): 19 (01/12/23 0902)    CODE STATUS:   Code Status and Medical Interventions:   Ordered at: 01/05/23 9733     Level Of Support Discussed With:    Patient     Code Status (Patient has no pulse and is not breathing):    CPR (Attempt to Resuscitate)     Medical Interventions (Patient has pulse or is breathing):    Full Support       JOEY Latham  01/12/23                Electronically signed by Ana Carrera APRN at 01/12/23 1204     Kevin Abdul MD at 01/12/23 0740          Maura Leon  1960  [id number]     Chief Complaint:  Right foot infection    Reason for Consultation: right foot infection    History of present illness:     Patient is a 62 y.o.  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  Follows with podiatry in [place]; she has diabetes with chronic sensory neuropathy and has not noticed any recent exposures.  No specific blunt force or penetrating trauma.  She has not stepped on anything she knows of.  No animal insect or arthropod bite.  No fresh/brackish/salt water exposure.  No prior history MRSA VRE C. difficile or ESBL/KP C/CRE organisms.    She is admitted on January 5, 2023 with acute deterioration at the right foot.  She had just noticed a wound on the plantar side of her foot when she had spontaneous drainage and does not know how long it had been there.  She has no pain because of her sensory neuropathy.  She developed acute redness/swelling and was told to come to the emergency room by her podiatrist.  She was noted to have fever/leukocytosis with elevated procalcitonin, sepsis per admission H&P and abnormal MRI with plantar foot wound/ulceration over the second MTP joint with adjacent soft tissue and inflammatory phlegmon but no focal fluid collection per radiology and no osteomyelitis per radiology.  Initial x-ray did raise concern for gas in the soft tissue per radiology.  Dr. Raygoza has seen the patient and he is planning for surgical debridement on January 6 1/6/23 surgery by Dr Raygoza:  \"Pre-op Diagnosis: Gas gangrene of the right foot involving the plantar surface of the right second toe metatarsal head area  Post-op Diagnosis:   Same with extensive necrotizing fasciitis of the right second toe involving the flexor and extensor tendons.\"    \"Procedure(s): Right second toe transmetatarsal amputation through the distal third of the second metatarsal bone with extensive sharp soft tissue debridement.  Wound left open to heal by secondary intent/wound VAC therapy\"    **operative cultures from foot with S Mitis, anaerobic culture negative    1/6 MRSA surveillance culture positive    1/12/23   Possible [facility] " "per notes.    Hx per nursing; No fever;  postop no pain at the right foot with her sensory neuropathy.  She has redness/swelling  Generally improving since surgery.  She denies any other specific focal complaints    No headache photophobia or neck stiffness.  No shortness of breath cough or hemoptysis.  No nausea vomiting diarrhea or abdominal pain.  No dysuria hematuria or pyuria.  No skin rash    Review of Systems    1/12/23 per nursing,     Constitutional-- No  chills or sweats.  Appetite better  Heent-- No new vision, hearing or throat complaints.  No epistaxis or oral sores.  Denies odynophagia or dysphagia.  No flashers, floaters or eye pain. No odynophagia or dysphagia. No headache, photophobia or neck stiffness.  CV-- No chest pain, palpitation or syncope  Resp-- No SOB/cough/Hemoptysis  GI- No nausea, vomiting, or diarrhea.  No hematochezia, melena, or hematemesis. Denies jaundice or chronic liver disease.  -- No dysuria, hematuria, or flank pain.  Denies hesitancy, urgency or flank pain.  Lymph- no swollen lymph nodes in neck/axilla or groin.   Heme- No active bruising or bleeding; no Hx of DVT or PE.  MS-- no acute swelling or pain in the bones or joints of arms/legs aside from above.  No new back pain.  Neuro-- No acute focal weakness or numbness in the arms or legs.  No seizures.    Full 12 point review of systems reviewed and negative otherwise for acute complaints, except for above    Physical Exam: Nursing/chaperone present  Vital Signs   /65 (BP Location: Right arm, Patient Position: Lying)   Pulse 72   Temp 97.8 °F (36.6 °C) (Axillary)   Resp 16   Ht 167.6 cm (66\")   Wt 116 kg (255 lb)   SpO2 96%   BMI 41.16 kg/m²     GENERAL: awake, in no acute distress.   HEENT: Normocephalic, atraumatic.   . No conjunctival injection. No icterus. Oropharynx clear without evidence of thrush or exudate. No evidence of peridontal disease.    NECK: Supple without nuchal rigidity. No mass.   HEART: " RRR; No murmur, rubs, gallops.   LUNGS: Clear to auscultation bilaterally without wheezing, rales, rhonchi. Normal respiratory effort. Nonlabored. No dullness.  ABDOMEN: Soft, nontender, nondistended. Positive bowel sounds. No rebound or guarding. NO mass or HSM.  EXT:  No cyanosis, clubbing . No cord.  See below   MSK: FROM without joint effusions noted arms/legs.    SKIN: Warm and dry without cutaneous eruptions on Inspection/palpation.  See below  NEURO: awake    Right foot surgical site noted with VAC; d/w PT and granulation increased but still with exposed bone at base of wound;  Redness at foot less.  No discrete mass bulge or fluctuance.  No crepitus or bulla.  Partial right hallux amputation noted with healed surgical site.  Onychomycosis noted with thickened nails; no new purulence    No peripheral stigmata/phenomena of endocarditis    IV without obvious redness or drainage    Laboratory Data    Results from last 7 days   Lab Units 01/09/23  0256 01/07/23  0353 01/06/23  0411   WBC 10*3/mm3 8.53 13.24* 16.19*   HEMOGLOBIN g/dL 11.4* 11.7* 11.5*   HEMATOCRIT % 34.7 36.0 34.2   PLATELETS 10*3/mm3 313 275 287     Results from last 7 days   Lab Units 01/09/23  0256   SODIUM mmol/L 137   POTASSIUM mmol/L 3.9   CHLORIDE mmol/L 99   CO2 mmol/L 28.0   BUN mg/dL 15   CREATININE mg/dL 1.00   GLUCOSE mg/dL 352*   CALCIUM mg/dL 9.2     Results from last 7 days   Lab Units 01/09/23  0256   ALK PHOS U/L 98   BILIRUBIN mg/dL 0.6   ALT (SGPT) U/L 43*   AST (SGOT) U/L 22     Results from last 7 days   Lab Units 01/05/23  1755   SED RATE mm/hr 74*     Results from last 7 days   Lab Units 01/05/23  1755   CRP mg/dL 12.67*       Estimated Creatinine Clearance: 76.5 mL/min (by C-G formula based on SCr of 1 mg/dL).      Microbiology:      Radiology:  Imaging Results (Last 72 Hours)     ** No results found for the last 72 hours. **            Impression:     --Acute sepsis present on admission per medicine notes, in the setting  of chronic immunosuppression/psoriatic arthritis and acute deterioration in her right foot with right foot infection most likely source.  Significant leukocytosis with abnormal procalcitonin and abnormal imaging at admit.     Timing/option of threshold including extent for any further debridement/amputation at discretion of Dr. Raygoza; surgery 1/6 as above    --acute /severe right foot infection with necrotizing fasciitis at surgery as above; exposed MT bone at base of wound    **Cx with S Mitis so far PCN sensitive;  Anaerobic culture negative    --MRSA surveillance culture positivity; no MRSA at her foot per microbiology    -- Psoriatic arthritis with chronic methotrexate, currently on hold by medicine team.    -- Diabetes with chronic sensory neuropathy.  She understands the importance of protecting her feet.  You need to tightly control blood sugar to give best chance for healing    -- Antibiotic allergies as above to clindamycin, doxycycline, sulfa and fluoroquinolones      PLAN:      -- IV  Rocephin; I anticipate IV abx  6 weeks from surgery depending on clinical course/tolerability, etc..    -- Check/review labs cultures and scans    -- Partial history per nursing staff    -- Discussed with microbiology    -- Highly complex set of issues with high risk for further serious morbidity and other serious sequela    -- Discussed with Dr. Raygoza and PT.  VAC at present      **pathology MT no significan inflmmation at bone per pathology but exposed bone at wound base may need to treat as MT osteomyelitis equivalent; my partners can continue to follow at [facility]    Copied text in this note has been reviewed and is accurate as of 01/12/23.        Kevin Abdul MD  1/12/2023                Electronically signed by Kevin Abdul MD at 01/12/23 3577     Javon Raygoza MD at 01/12/23 0522          Cardiothoracic Surgery Progress Note      POD #: 6-transmetatarsal amputation right second toe extensive soft tissue  debridement wound left open to heal by secondary intent wound VAC     LOS: 7 days      Subjective: Awake and alert    Objective:  Vital Signs vital signs below noted T-max past 2490.1 °F  Temp:  [97 °F (36.1 °C)-98.1 °F (36.7 °C)] 97.8 °F (36.6 °C)  Heart Rate:  [71-84] 72  Resp:  [16-18] 16  BP: (108-131)/(53-80) 108/65    Physical Exam:   General Appearance: Oriented x3   Lungs:   Heart:   Skin:   Incision: Toe amputation site has wound VAC in place and dry dressing no drainage noted.     Results:  Results from last 7 days   Lab Units 01/09/23  0256   WBC 10*3/mm3 8.53   HEMOGLOBIN g/dL 11.4*   HEMATOCRIT % 34.7   PLATELETS 10*3/mm3 313     Results from last 7 days   Lab Units 01/09/23  0256   SODIUM mmol/L 137   POTASSIUM mmol/L 3.9   CHLORIDE mmol/L 99   CO2 mmol/L 28.0   BUN mg/dL 15   CREATININE mg/dL 1.00   GLUCOSE mg/dL 352*   CALCIUM mg/dL 9.2         Assessment: #1 postop day 6 right second toe transmetatarsal amputation extensive soft tissue debridement wound VAC in place  2 diabetes mellitus and diabetic neuropathy.  3.  Chronic plantar surface ulcer at the metatarsal head of the right second toe.  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      Plan: Wound VAC management PT wound care.  Antibiotics per infectious disease.  Medical manage per hospitalist.      Javon Raygoza MD - 01/12/23 - 05:22 EST        Electronically signed by Javon Raygoza MD at 01/12/23 0535

## 2023-01-12 NOTE — PROGRESS NOTES
"Maura Leon  1960  [id number]     Chief Complaint:  Right foot infection    Reason for Consultation: right foot infection    History of present illness:     Patient is a 62 y.o.  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  Follows with podiatry in [place]; she has diabetes with chronic sensory neuropathy and has not noticed any recent exposures.  No specific blunt force or penetrating trauma.  She has not stepped on anything she knows of.  No animal insect or arthropod bite.  No fresh/brackish/salt water exposure.  No prior history MRSA VRE C. difficile or ESBL/KP C/CRE organisms.    She is admitted on January 5, 2023 with acute deterioration at the right foot.  She had just noticed a wound on the plantar side of her foot when she had spontaneous drainage and does not know how long it had been there.  She has no pain because of her sensory neuropathy.  She developed acute redness/swelling and was told to come to the emergency room by her podiatrist.  She was noted to have fever/leukocytosis with elevated procalcitonin, sepsis per admission H&P and abnormal MRI with plantar foot wound/ulceration over the second MTP joint with adjacent soft tissue and inflammatory phlegmon but no focal fluid collection per radiology and no osteomyelitis per radiology.  Initial x-ray did raise concern for gas in the soft tissue per radiology.  Dr. Raygoza has seen the patient and he is planning for surgical debridement on January 6 1/6/23 surgery by Dr Raygoza:  \"Pre-op Diagnosis: Gas gangrene of the right foot involving the plantar surface of the right second toe metatarsal head area  Post-op Diagnosis:   Same with extensive necrotizing fasciitis of the right second toe involving the flexor and extensor tendons.\"    \"Procedure(s): Right second " "toe transmetatarsal amputation through the distal third of the second metatarsal bone with extensive sharp soft tissue debridement.  Wound left open to heal by secondary intent/wound VAC therapy\"    **operative cultures from foot with S Mitis, anaerobic culture negative    1/6 MRSA surveillance culture positive    1/12/23   Possible [facility] per notes.    Hx per nursing; No fever;  postop no pain at the right foot with her sensory neuropathy.  She has redness/swelling  Generally improving since surgery.  She denies any other specific focal complaints    No headache photophobia or neck stiffness.  No shortness of breath cough or hemoptysis.  No nausea vomiting diarrhea or abdominal pain.  No dysuria hematuria or pyuria.  No skin rash    Review of Systems    1/12/23 per nursing,     Constitutional-- No  chills or sweats.  Appetite better  Heent-- No new vision, hearing or throat complaints.  No epistaxis or oral sores.  Denies odynophagia or dysphagia.  No flashers, floaters or eye pain. No odynophagia or dysphagia. No headache, photophobia or neck stiffness.  CV-- No chest pain, palpitation or syncope  Resp-- No SOB/cough/Hemoptysis  GI- No nausea, vomiting, or diarrhea.  No hematochezia, melena, or hematemesis. Denies jaundice or chronic liver disease.  -- No dysuria, hematuria, or flank pain.  Denies hesitancy, urgency or flank pain.  Lymph- no swollen lymph nodes in neck/axilla or groin.   Heme- No active bruising or bleeding; no Hx of DVT or PE.  MS-- no acute swelling or pain in the bones or joints of arms/legs aside from above.  No new back pain.  Neuro-- No acute focal weakness or numbness in the arms or legs.  No seizures.    Full 12 point review of systems reviewed and negative otherwise for acute complaints, except for above    Physical Exam: Nursing/chaperone present  Vital Signs   /65 (BP Location: Right arm, Patient Position: Lying)   Pulse 72   Temp 97.8 °F (36.6 °C) (Axillary)   Resp 16   Ht " "167.6 cm (66\")   Wt 116 kg (255 lb)   SpO2 96%   BMI 41.16 kg/m²     GENERAL: awake, in no acute distress.   HEENT: Normocephalic, atraumatic.   . No conjunctival injection. No icterus. Oropharynx clear without evidence of thrush or exudate. No evidence of peridontal disease.    NECK: Supple without nuchal rigidity. No mass.   HEART: RRR; No murmur, rubs, gallops.   LUNGS: Clear to auscultation bilaterally without wheezing, rales, rhonchi. Normal respiratory effort. Nonlabored. No dullness.  ABDOMEN: Soft, nontender, nondistended. Positive bowel sounds. No rebound or guarding. NO mass or HSM.  EXT:  No cyanosis, clubbing . No cord.  See below   MSK: FROM without joint effusions noted arms/legs.    SKIN: Warm and dry without cutaneous eruptions on Inspection/palpation.  See below  NEURO: awake    Right foot surgical site noted with VAC; d/w PT and granulation increased but still with exposed bone at base of wound;  Redness at foot less.  No discrete mass bulge or fluctuance.  No crepitus or bulla.  Partial right hallux amputation noted with healed surgical site.  Onychomycosis noted with thickened nails; no new purulence    No peripheral stigmata/phenomena of endocarditis    IV without obvious redness or drainage    Laboratory Data    Results from last 7 days   Lab Units 01/09/23  0256 01/07/23  0353 01/06/23  0411   WBC 10*3/mm3 8.53 13.24* 16.19*   HEMOGLOBIN g/dL 11.4* 11.7* 11.5*   HEMATOCRIT % 34.7 36.0 34.2   PLATELETS 10*3/mm3 313 275 287     Results from last 7 days   Lab Units 01/09/23  0256   SODIUM mmol/L 137   POTASSIUM mmol/L 3.9   CHLORIDE mmol/L 99   CO2 mmol/L 28.0   BUN mg/dL 15   CREATININE mg/dL 1.00   GLUCOSE mg/dL 352*   CALCIUM mg/dL 9.2     Results from last 7 days   Lab Units 01/09/23  0256   ALK PHOS U/L 98   BILIRUBIN mg/dL 0.6   ALT (SGPT) U/L 43*   AST (SGOT) U/L 22     Results from last 7 days   Lab Units 01/05/23  1755   SED RATE mm/hr 74*     Results from last 7 days   Lab Units " 01/05/23  1755   CRP mg/dL 12.67*       Estimated Creatinine Clearance: 76.5 mL/min (by C-G formula based on SCr of 1 mg/dL).      Microbiology:      Radiology:  Imaging Results (Last 72 Hours)     ** No results found for the last 72 hours. **            Impression:     --Acute sepsis present on admission per medicine notes, in the setting of chronic immunosuppression/psoriatic arthritis and acute deterioration in her right foot with right foot infection most likely source.  Significant leukocytosis with abnormal procalcitonin and abnormal imaging at admit.     Timing/option of threshold including extent for any further debridement/amputation at discretion of Dr. Raygoza; surgery 1/6 as above    --acute /severe right foot infection with necrotizing fasciitis at surgery as above; exposed MT bone at base of wound    **Cx with S Mitis so far PCN sensitive;  Anaerobic culture negative    --MRSA surveillance culture positivity; no MRSA at her foot per microbiology    -- Psoriatic arthritis with chronic methotrexate, currently on hold by medicine team.    -- Diabetes with chronic sensory neuropathy.  She understands the importance of protecting her feet.  You need to tightly control blood sugar to give best chance for healing    -- Antibiotic allergies as above to clindamycin, doxycycline, sulfa and fluoroquinolones      PLAN:      -- IV  Rocephin; I anticipate IV abx  6 weeks from surgery depending on clinical course/tolerability, etc..    -- Check/review labs cultures and scans    -- Partial history per nursing staff    -- Discussed with microbiology    -- Highly complex set of issues with high risk for further serious morbidity and other serious sequela    -- Discussed with Dr. Raygoza and PT.  VAC at present      **pathology MT no significan inflmmation at bone per pathology but exposed bone at wound base may need to treat as MT osteomyelitis equivalent; my partners can continue to follow at [facility]    Copied text in this  note has been reviewed and is accurate as of 01/12/23.        Kevin Abdul MD  1/12/2023

## 2023-01-12 NOTE — PROGRESS NOTES
[facility] Medicine Services  PROGRESS NOTE    Patient Name: Maura Leon  : 1960  MRN: [id number]    Date of Admission: 2023  Primary Care Physician: Emanuel Fuentes DPM    Subjective   Subjective     CC:  Diabetic ulcer    HPI:  Patient is lying in bed getting ready to get a PICC line and in NAD.  Patient is hoping that her insurance approves her rehab at [facility].  She is anxious to get back to work.  No complaints overnight.  Wound VAC still in place on the top of her foot.    ROS:  Gen- No fevers, chills  CV- No chest pain, palpitations  Resp- No cough, dyspnea  GI- No N/V/D, abd pain  All other systems have been reviewed and the pertinent positives and negatives are listed above in the HPI or ROS      Objective   Objective     Vital Signs:   Temp:  [97 °F (36.1 °C)-98.1 °F (36.7 °C)] 97 °F (36.1 °C)  Heart Rate:  [71-81] 81  Resp:  [16-18] 18  BP: (107-131)/(64-81) 107/81     Physical Exam:  Constitutional: Alert, morbidly obese female sitting up in bed with wound VAC on the top of right foot getting prepped for PICC insertion  Eyes: EOMI, sclerae anicteric, no conjunctival injection  Head: NCAT  ENT: [place], moist mucous membranes   Respiratory: Nonlabored, symmetrical chest expansion, CTAB, 96%RA  Cardiovascular: RRR, HR 76, no M/R/G  Gastrointestinal: Morbidly obese, soft, NT, ND +BS  Musculoskeletal: RENEE; no LE edema bilaterally; feet in waffle boots bilaterally  Neurologic: Oriented x4, strength symmetric in all extremities, follows all commands, speech clear  Skin: No rashes on exposed skin; right foot wrapped with wound VAC in place-CDI  Psychiatric: Pleasant and cooperative; normal affect    Results Reviewed:  LAB RESULTS:      Lab 23  0256 23  0353 23  0411 23  1755   WBC 8.53 13.24* 16.19* 20.01*   HEMOGLOBIN 11.4* 11.7* 11.5* 13.1   HEMATOCRIT 34.7 36.0 34.2 39.5   PLATELETS 313 275 287 367   NEUTROS ABS  --  11.18* 13.72* 17.97*   IMMATURE  GRANS (ABS)  --  0.08* 0.12* 0.08*   LYMPHS ABS  --  0.84 1.05 0.90   MONOS ABS  --  0.96* 1.20* 0.97*   EOS ABS  --  0.12 0.03 0.01   MCV 96.7 97.0 94.0 94.5   SED RATE  --   --   --  74*   CRP  --   --   --  12.67*   PROCALCITONIN  --   --   --  0.35*   LACTATE  --   --   --  1.9         Lab 01/09/23  0256 01/07/23  0353 01/06/23  0411 01/05/23  1755   SODIUM 137 135* 137 135*   POTASSIUM 3.9 3.9 3.1* 3.6   CHLORIDE 99 102 99 95*   CO2 28.0 19.0* 27.0 27.0   ANION GAP 10.0 14.0 11.0 13.0   BUN 15 16 14 16   CREATININE 1.00 0.96 0.77 0.94   EGFR 64.2 67.5 87.9 69.2   GLUCOSE 352* 211* 189* 364*   CALCIUM 9.2 8.8 9.3 9.7   MAGNESIUM  --   --  1.9  --    HEMOGLOBIN A1C  --   --  8.20*  --          Lab 01/09/23  0256 01/05/23  1755   TOTAL PROTEIN 6.1 8.3   ALBUMIN 3.2* 4.3   GLOBULIN 2.9 4.0   ALT (SGPT) 43* 44*   AST (SGOT) 22 25   BILIRUBIN 0.6 1.2   ALK PHOS 98 113                 Lab 01/06/23  1155   ABO TYPING O   RH TYPING Positive   ANTIBODY SCREEN Negative         Brief Urine Lab Results     None          Microbiology Results Abnormal     Procedure Component Value - Date/Time    Anaerobic Culture - Tissue, Toe, Right [[id number]]  (Normal) Collected: 01/06/23 1832    Lab Status: Final result Specimen: Tissue from Toe, Right Updated: 01/11/23 0754     Anaerobic Culture No anaerobes isolated at 5 days    Anaerobic Culture - Wound, Toe, Right [[id number]]  (Normal) Collected: 01/06/23 1733    Lab Status: Final result Specimen: Wound from Toe, Right Updated: 01/11/23 0754     Anaerobic Culture No anaerobes isolated at 5 days    Blood Culture - Blood, Arm, Left [[id number]]  (Normal) Collected: 01/05/23 2105    Lab Status: Final result Specimen: Blood from Arm, Left Updated: 01/10/23 2131     Blood Culture No growth at 5 days    Blood Culture - Blood, Arm, Left [[id number]]  (Normal) Collected: 01/05/23 1758    Lab Status: Final result Specimen: Blood from Arm, Left Updated: 01/10/23 1815     Blood Culture No growth  at 5 days    Wound Culture - Swab, Foot, Right [[id number]] Collected: 01/05/23 2201    Lab Status: Final result Specimen: Swab from Foot, Right Updated: 01/08/23 0730     Wound Culture Moderate growth (3+) Normal Skin Kayli     Gram Stain No WBCs or organisms seen          No radiology results from the last 24 hrs        I have reviewed the medications:  Scheduled Meds:artificial tears, , Both Eyes, Nightly  aspirin, 81 mg, Oral, Daily  atorvastatin, 10 mg, Oral, Daily  Calcium Carb-Cholecalciferol, 1 tablet, Oral, Daily  castor oil-balsam peru, 1 application, Topical, Q12H  cefTRIAXone, 2 g, Intravenous, Q24H  cetirizine, 5 mg, Oral, Daily  folic acid, 0.5 mg, Oral, Daily  heparin (porcine), 5,000 Units, Subcutaneous, Q12H  hydroCHLOROthiazide, 50 mg, Oral, Daily  insulin detemir, 35 Units, Subcutaneous, Q12H  insulin lispro, 0-24 Units, Subcutaneous, 4x Daily With Meals & Nightly  Insulin Lispro, 5 Units, Subcutaneous, TID With Meals  nystatin, , Topical, Q8H  pantoprazole, 40 mg, Oral, Q AM  saccharomyces boulardii, 250 mg, Oral, BID  sodium chloride, 10 mL, Intravenous, Q12H  sodium chloride, 10 mL, Intravenous, Q12H  vitamin D3, 5,000 Units, Oral, Daily      Continuous Infusions:sodium chloride, 50 mL/hr, Last Rate: 50 mL/hr (01/10/23 2145)      PRN Meds:.•  [DISCONTINUED] acetaminophen **OR** acetaminophen **OR** acetaminophen  •  acetaminophen  •  betamethasone (augmented)  •  dextrose  •  dextrose  •  glucagon (human recombinant)  •  HYDROcodone-acetaminophen  •  HYDROmorphone **AND** naloxone  •  Morphine **AND** naloxone  •  ondansetron **OR** ondansetron  •  potassium chloride **OR** potassium chloride **OR** potassium chloride  •  sodium chloride  •  sodium chloride  •  sodium chloride    Assessment & Plan   Assessment & Plan     Active Hospital Problems    Diagnosis  POA   • **Sepsis (HCC) [A41.9]  Yes   • Ulcer of right foot (HCC) [L97.519]  Yes   • Type 2 diabetes mellitus (HCC) [E11.9]  Yes   •  Psoriatic arthritis ([facility]) [L40.50]  Yes   • Immunocompromised (HCC) [D84.9]  Yes   • GERD (gastroesophageal reflux disease) [K21.9]  Yes   • Cellulitis of right foot [L03.115]  Unknown      Resolved Hospital Problems   No resolved problems to display.        Brief Hospital Course to date:  62 year old female with history of type 2 diabetes, psoriatic arthritis on immunosuppressive medications who presents with a right foot ulcer.  Patient was noted to have a leukocytosis of 20,000, and elevated procalcitonin and elevated CRP.  X-ray of right foot showed soft tissue swelling throughout the forefoot and midfoot jesting cellulitis, suggestion of gas production the soft tissues of the first digit and extending towards the second digit. MRI of the right foot no abscess or osteomyelitis.      Sepsis secondary to right foot ulcer with cellulitis in the setting of type 2 diabetes, Immunocompromised state  -Continue dapto and zosyn.  -MRSA +  -Wound culture with alpha strep  -S/p R 2nd toe transmetatarsal amputation with Dr. Raygoza on 1/6/2023  --Wound VAC in place.  --Holding methotrexate  -ID and surg following  --PICC order placed  --AM labs on 1/9/2023 unremarkable  --Transfer to  [facility] is pending insurance approval    T2DM w/ A1c 8.2  --24H glucose 234-291; improving  --Increased Levemir 35 units Q12H; adjust as needed  --Patient takes Tresiba 60 units AM and 80 units PM  --Mealtime bolus Humalog 3 units before meals with hold parameters    Acute anemia-stable  --D/t postop blood loss; admitting Hgb 13.1  --Hgb 11.4     Psoriatic arthritis   -on methotrexate: hold for now      GERD  -PPI      Hyperlipidemia  -continue statin      DVT prophylaxis:  scds to LLE      Expected Discharge Location and Transportation: home vs rehab  Expected Discharge   Expected Discharge Date and Time     Expected Discharge Date Expected Discharge Time    Jan 13, 2023            DVT prophylaxis:  Medical and mechanical DVT prophylaxis orders  are present.     AM-PAC 6 Clicks Score (PT): 19 (01/12/23 0902)    CODE STATUS:   Code Status and Medical Interventions:   Ordered at: 01/05/23 4356     Level Of Support Discussed With:    Patient     Code Status (Patient has no pulse and is not breathing):    CPR (Attempt to Resuscitate)     Medical Interventions (Patient has pulse or is breathing):    Full Support       JOEY Latham  01/12/23

## 2023-01-12 NOTE — PLAN OF CARE
Goal Outcome Evaluation:  Plan of Care Reviewed With: patient   Rested well, VSS, no complaints voiced, wound vac in use on right foot, dressing intact with heel boots in place. Will continue with the plan of care.      Progress: improving

## 2023-01-12 NOTE — PROGRESS NOTES
Cardiothoracic Surgery Progress Note      POD #: 6-transmetatarsal amputation right second toe extensive soft tissue debridement wound left open to heal by secondary intent wound VAC     LOS: 7 days      Subjective: Awake and alert    Objective:  Vital Signs vital signs below noted T-max past 2490.1 °F  Temp:  [97 °F (36.1 °C)-98.1 °F (36.7 °C)] 97.8 °F (36.6 °C)  Heart Rate:  [71-84] 72  Resp:  [16-18] 16  BP: (108-131)/(53-80) 108/65    Physical Exam:   General Appearance: Oriented x3   Lungs:   Heart:   Skin:   Incision: Toe amputation site has wound VAC in place and dry dressing no drainage noted.     Results:  Results from last 7 days   Lab Units 01/09/23  0256   WBC 10*3/mm3 8.53   HEMOGLOBIN g/dL 11.4*   HEMATOCRIT % 34.7   PLATELETS 10*3/mm3 313     Results from last 7 days   Lab Units 01/09/23  0256   SODIUM mmol/L 137   POTASSIUM mmol/L 3.9   CHLORIDE mmol/L 99   CO2 mmol/L 28.0   BUN mg/dL 15   CREATININE mg/dL 1.00   GLUCOSE mg/dL 352*   CALCIUM mg/dL 9.2         Assessment: #1 postop day 6 right second toe transmetatarsal amputation extensive soft tissue debridement wound VAC in place  2 diabetes mellitus and diabetic neuropathy.  3.  Chronic plantar surface ulcer at the metatarsal head of the right second toe.  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      Plan: Wound VAC management PT wound care.  Antibiotics per infectious disease.  Medical manage per hospitalist.      Javon Raygoza MD - 01/12/23 - 05:22 EST

## 2023-01-12 NOTE — PLAN OF CARE
Goal Outcome Evaluation:  Plan of Care Reviewed With: patient           Outcome Evaluation: wound vac removed to allow for transfer to rehab.  Pt will benefit from cont use of wound vac at d/c to allow for better granulation of wound base.

## 2023-01-12 NOTE — CONSULTS
Placed by GUILLERMO Benito RN - confirmed with 3cg.  RUE single lumen PICC with 45cm total and 2cm exposed.

## 2023-01-12 NOTE — THERAPY WOUND CARE TREATMENT
Acute Care - Wound/Debridement Treatment Note  [facility]     Patient Name: Maura Leon  : 1960  MRN: [id number]  Today's Date: 2023                Admit Date: 2023    Visit Dx:    ICD-10-CM ICD-9-CM   1. Cellulitis of right foot  L03.115 682.7   2. History of diabetes mellitus, type II  Z86.39 V12.29       Patient Active Problem List   Diagnosis   • Sepsis (HCC)   • Ulcer of right foot (HCC)   • Type 2 diabetes mellitus (HCC)   • Psoriatic arthritis (HCC)   • Immunocompromised (HCC)   • GERD (gastroesophageal reflux disease)   • Cellulitis of right foot        Past Medical History:   Diagnosis Date   • Arthritis    • Diabetes mellitus (HCC)    • GERD (gastroesophageal reflux disease)    • Irritable bowel    • Sleep apnea         Past Surgical History:   Procedure Laterality Date   • AMPUTATION DIGIT Right 2023    Procedure: FOOT SURGICAL EXPLORATION WITH TOE AMPUTATION RIGHT;  Surgeon: Javon Raygoza MD;  Location[facility];  Service: General;  Laterality: Right;   • TOE SURGERY Right 2022           Wound 23 2315 Right posterior plantar Diabetic Ulcer (Active)   Dressing Appearance dry;intact;no drainage 23 0902       Wound 23 1733 Right anterior foot Incision (Active)   Dressing Appearance dry;intact;no drainage 23 1400   Closure SORAIDA 23 0902   Base moist;pink;red;slough;yellow;subcutaneous 23 1400   Periwound dry;intact;pink 23 1400   Periwound Temperature warm 23 1400   Periwound Skin Turgor soft 23 1400   Edges open 23 1400   Drainage Characteristics/Odor serosanguineous 23 1400   Drainage Amount small 23 1400   Care, Wound irrigated with;sterile normal saline;debrided 23 1400   Dressing Care dressing changed 23 1400   Periwound Care dry periwound area maintained 23 1400       Wound 23 0940 Bilateral posterior thigh Pressure Injury (Active)   Pressure Injury Stage DTPI 23    Dressing Appearance dry;intact;no drainage 01/12/23 0902   Drainage Amount none 01/12/23 0902   Care, Wound cleansed with;sterile normal saline 01/11/23 1600       NPWT (Negative Pressure Wound Therapy) 01/07/23 0900 R 2nd toe amputation site (Active)   Therapy Setting vacuum off 01/12/23 1400   Pressure Setting 150 mmHg 01/12/23 0600   Sponges Removed 2 01/12/23 1400   Finger sweep complete Yes 01/12/23 1400         WOUND DEBRIDEMENT  Total area of Debridement: ~5cm2  Debridement Site 1  Location- Site 1: Amputation site  Selective Debridement- Site 1: Wound Surface <20cmsq  Instruments- Site 1: tweezers  Excised Tissue Description- Site 1: minimum, slough  Bleeding- Site 1: none               PT Assessment (last 12 hours)     PT Evaluation and Treatment     Row Name 01/12/23 1400          Physical Therapy Time and Intention    Subjective Information complains of;weakness;fatigue  -MF     Document Type therapy note (daily note);wound care  -MF     Mode of Treatment individual therapy;physical therapy  -MF     Row Name 01/12/23 1400          Pain    Pretreatment Pain Rating 0/10 - no pain  -MF     Posttreatment Pain Rating 0/10 - no pain  -MF     Row Name             Wound 01/05/23 2315 Right posterior plantar Diabetic Ulcer    Wound - Properties Group Placement Date: 01/05/23  -DP Placement Time: 2315  -DP Present on Hospital Admission: Y  -DP Side: Right  -DP Orientation: posterior  -DP Location: plantar  -DP Primary Wound Type: Diabetic ulc  -DP    Retired Wound - Properties Group Placement Date: 01/05/23  -DP Placement Time: 2315  -DP Present on Hospital Admission: Y  -DP Side: Right  -DP Orientation: posterior  -DP Location: plantar  -DP Primary Wound Type: Diabetic ulc  -DP    Retired Wound - Properties Group Date first assessed: 01/05/23  -DP Time first assessed: 2315  -DP Present on Hospital Admission: Y  -DP Side: Right  -DP Location: plantar  -DP Primary Wound Type: Diabetic ulc  -DP    Row Name 01/12/23  1400          Wound 01/06/23 1733 Right anterior foot Incision    Wound - Properties Group Placement Date: 01/06/23  -EQ Placement Time: 1733 -EQ Present on Hospital Admission: N  -EQ Side: Right  -EQ Orientation: anterior  -EQ Location: foot  -EQ Primary Wound Type: Incision  -EQ    Dressing Appearance dry;intact;no drainage  -MF     Base moist;pink;red;slough;yellow;subcutaneous  -MF     Periwound dry;intact;pink  -MF     Periwound Temperature warm  -MF     Periwound Skin Turgor soft  -MF     Edges open  -MF     Drainage Characteristics/Odor serosanguineous  -MF     Drainage Amount small  -MF     Care, Wound irrigated with;sterile normal saline;debrided  nonsel zenia  -MF     Dressing Care dressing changed  -MF     Periwound Care dry periwound area maintained  -MF     Retired Wound - Properties Group Placement Date: 01/06/23  -EQ Placement Time: 1733 -EQ Present on Hospital Admission: N  -EQ Side: Right  -EQ Orientation: anterior  -EQ Location: foot  -EQ Primary Wound Type: Incision  -EQ    Retired Wound - Properties Group Date first assessed: 01/06/23  -EQ Time first assessed: 1733  -EQ Present on Hospital Admission: N  -EQ Side: Right  -EQ Location: foot  -EQ Primary Wound Type: Incision  -EQ    Row Name             Wound 01/11/23 0940 Bilateral posterior thigh Pressure Injury    Wound - Properties Group Placement Date: 01/11/23  -MFA Placement Time: 0940  -MFA Present on Hospital Admission: N  -MFA Side: Bilateral  -MFA Orientation: posterior  -MFA Location: thigh  -MFA Primary Wound Type: Pressure inj  -MFA, suspected DTPI     Retired Wound - Properties Group Placement Date: 01/11/23  -MFA Placement Time: 0940  -MFA Present on Hospital Admission: N  -MFA Side: Bilateral  -MFA Orientation: posterior  -MFA Location: thigh  -MFA Primary Wound Type: Pressure inj  -MFA, suspected DTPI     Retired Wound - Properties Group Date first assessed: 01/11/23  -MFA Time first assessed: 0940  -MFA Present on Hospital  Admission: N  -MFA Side: Bilateral  -MFA Location: thigh  -MFA Primary Wound Type: Pressure inj  -MFA, suspected DTPI     Row Name 01/12/23 1400          NPWT (Negative Pressure Wound Therapy) 01/07/23 0900 R 2nd toe amputation site    NPWT (Negative Pressure Wound Therapy) - Properties Group Placement Date: 01/07/23  - Placement Time: 0900  -LH Location: R 2nd toe amputation site  -    Therapy Setting vacuum off  -MF     Sponges Removed 2  1 black 1 white  -MF     Finger sweep complete Yes  -MF     Retired NPWT (Negative Pressure Wound Therapy) - Properties Group Placement Date: 01/07/23  - Placement Time: 0900  -LH Location: R 2nd toe amputation site  -LH    Retired NPWT (Negative Pressure Wound Therapy) - Properties Group Placement Date: 01/07/23  - Placement Time: 0900  -LH Location: R 2nd toe amputation site  -LH    Row Name 01/12/23 1400          Coping    Observed Emotional State calm;cooperative;pleasant  -     Verbalized Emotional State acceptance  -     Trust Relationship/Rapport care explained  -     Row Name 01/12/23 1400          Plan of Care Review    Plan of Care Reviewed With patient  -     Outcome Evaluation wound vac removed to allow for transfer to rehab.  Pt will benefit from cont use of wound vac at d/c to allow for better granulation of wound base.  -     Row Name 01/12/23 1400          Positioning and Restraints    Pre-Treatment Position in bed  -     Post Treatment Position bed  -MF     In Bed supine;call light within reach  -           User Key  (r) = Recorded By, (t) = Taken By, (c) = Cosigned By    Initials Name Provider Type    MF Kevin Marin, PT Physical Therapist     Rafy Chauhan, PT Physical Therapist    Nai Padilla, RN Registered Nurse    Aicha Thomas, RN Registered Nurse    Destiny Carmichael RN Registered Nurse              Physical Therapy Education     Title: PT OT SLP Therapies (In Progress)     Topic: Physical Therapy (Done)      Point: Mobility training (Done)     Learning Progress Summary           Patient Acceptance, E, VU by AE at 1/10/2023 1535    Acceptance, E, VU by AE at 1/9/2023 1108    Acceptance, E, VU,NR by KG at 1/7/2023 1529                   Point: Home exercise program (Done)     Learning Progress Summary           Patient Acceptance, E, VU by AE at 1/10/2023 1535    Acceptance, E, VU by AE at 1/9/2023 1108    Acceptance, E, VU,NR by KG at 1/7/2023 1529                   Point: Body mechanics (Done)     Learning Progress Summary           Patient Acceptance, E, VU by AE at 1/10/2023 1535    Acceptance, E, VU by AE at 1/9/2023 1108    Acceptance, E, VU,NR by KG at 1/7/2023 1529                   Point: Precautions (Done)     Learning Progress Summary           Patient Acceptance, E, VU by AE at 1/10/2023 1535    Acceptance, E, VU by AE at 1/9/2023 1108    Acceptance, E, VU,NR by KG at 1/7/2023 1529                               User Key     Initials Effective Dates Name Provider Type Discipline    KG 01/04/23 -  Kaity Narvaez Physical Therapist PT    AE 09/21/21 -  Brian Horton PT Physical Therapist PT                Recommendation and Plan  Anticipated Equipment Needs at Discharge (PT): front wheeled walker  Anticipated Discharge Disposition (PT): inpatient rehabilitation facility  Planned Therapy Interventions (PT): wound care, patient/family education  Therapy Frequency (PT): daily  Plan of Care Reviewed With: patient           Outcome Evaluation: wound vac removed to allow for transfer to rehab.  Pt will benefit from cont use of wound vac at d/c to allow for better granulation of wound base.  Plan of Care Reviewed With: patient            Time Calculation   PT Charges     Row Name 01/12/23 1400             Time Calculation    Start Time 1400  -MF      PT Goal Re-Cert Due Date 01/17/23  -MF         Untimed Charges    Wound Care 21272 Non-selective debridement  -MF      97602-Non-selective debridement 20  -MF          Total Minutes    Untimed Charges Total Minutes 20  -MF       Total Minutes 20  -MF            User Key  (r) = Recorded By, (t) = Taken By, (c) = Cosigned By    Initials Name Provider Type    Kevin Manjarrez, PT Physical Therapist                  Therapy Charges for Today     Code Description Service Date Service Provider Modifiers Qty    52639597983 HC PT NEG PRESS WOUND TO 50SQCM DME1 1/11/2023 Kevin Marin, PT  1    90420162499 HC NONSELECTIVE DEBRIDEMENT 1/12/2023 Kevin Marin, PT GP 1            PT G-Codes  Outcome Measure Options: AM-PAC 6 Clicks Basic Mobility (PT)  AM-PAC 6 Clicks Score (PT): 19  AM-PAC 6 Clicks Score (OT): 15       Kevin Marin, PT  1/12/2023

## 2023-01-13 LAB
GLUCOSE BLDC GLUCOMTR-MCNC: 207 MG/DL (ref 70–130)
GLUCOSE BLDC GLUCOMTR-MCNC: 219 MG/DL (ref 70–130)
GLUCOSE BLDC GLUCOMTR-MCNC: 238 MG/DL (ref 70–130)
GLUCOSE BLDC GLUCOMTR-MCNC: 244 MG/DL (ref 70–130)

## 2023-01-13 PROCEDURE — 99232 SBSQ HOSP IP/OBS MODERATE 35: CPT | Performed by: NURSE PRACTITIONER

## 2023-01-13 PROCEDURE — 63710000001 INSULIN LISPRO (HUMAN) PER 5 UNITS: Performed by: NURSE PRACTITIONER

## 2023-01-13 PROCEDURE — 25010000002 HEPARIN (PORCINE) PER 1000 UNITS: Performed by: THORACIC SURGERY (CARDIOTHORACIC VASCULAR SURGERY)

## 2023-01-13 PROCEDURE — 97597 DBRDMT OPN WND 1ST 20 CM/<: CPT

## 2023-01-13 PROCEDURE — 25010000002 CEFTRIAXONE PER 250 MG: Performed by: INTERNAL MEDICINE

## 2023-01-13 PROCEDURE — 82962 GLUCOSE BLOOD TEST: CPT

## 2023-01-13 PROCEDURE — 63710000001 INSULIN DETEMIR PER 5 UNITS: Performed by: NURSE PRACTITIONER

## 2023-01-13 PROCEDURE — 99024 POSTOP FOLLOW-UP VISIT: CPT | Performed by: THORACIC SURGERY (CARDIOTHORACIC VASCULAR SURGERY)

## 2023-01-13 RX ADMIN — NYSTATIN: 100000 POWDER TOPICAL at 12:20

## 2023-01-13 RX ADMIN — CASTOR OIL AND BALSAM, PERU 1 APPLICATION: 788; 87 OINTMENT TOPICAL at 20:40

## 2023-01-13 RX ADMIN — NYSTATIN: 100000 POWDER TOPICAL at 20:41

## 2023-01-13 RX ADMIN — HEPARIN SODIUM 5000 UNITS: 5000 INJECTION INTRAVENOUS; SUBCUTANEOUS at 08:15

## 2023-01-13 RX ADMIN — Medication 10 ML: at 20:41

## 2023-01-13 RX ADMIN — FOLIC ACID 0.5 MG: 1 TABLET ORAL at 08:16

## 2023-01-13 RX ADMIN — CASTOR OIL AND BALSAM, PERU 1 APPLICATION: 788; 87 OINTMENT TOPICAL at 08:16

## 2023-01-13 RX ADMIN — CEFTRIAXONE SODIUM 2 G: 2 INJECTION, POWDER, FOR SOLUTION INTRAMUSCULAR; INTRAVENOUS at 08:15

## 2023-01-13 RX ADMIN — INSULIN LISPRO 8 UNITS: 100 INJECTION, SOLUTION INTRAVENOUS; SUBCUTANEOUS at 17:35

## 2023-01-13 RX ADMIN — HEPARIN SODIUM 5000 UNITS: 5000 INJECTION INTRAVENOUS; SUBCUTANEOUS at 20:40

## 2023-01-13 RX ADMIN — PANTOPRAZOLE SODIUM 40 MG: 40 TABLET, DELAYED RELEASE ORAL at 05:31

## 2023-01-13 RX ADMIN — Medication 250 MG: at 08:15

## 2023-01-13 RX ADMIN — INSULIN LISPRO 5 UNITS: 100 INJECTION, SOLUTION INTRAVENOUS; SUBCUTANEOUS at 12:20

## 2023-01-13 RX ADMIN — ATORVASTATIN CALCIUM 10 MG: 10 TABLET, FILM COATED ORAL at 08:16

## 2023-01-13 RX ADMIN — CETIRIZINE HYDROCHLORIDE 5 MG: 10 TABLET, FILM COATED ORAL at 08:15

## 2023-01-13 RX ADMIN — INSULIN DETEMIR 35 UNITS: 100 INJECTION, SOLUTION SUBCUTANEOUS at 20:40

## 2023-01-13 RX ADMIN — ASPIRIN 81 MG CHEWABLE TABLET 81 MG: 81 TABLET CHEWABLE at 08:16

## 2023-01-13 RX ADMIN — MINERAL OIL, AND WHITE PETROLATUM: 425; 573 OINTMENT OPHTHALMIC at 20:40

## 2023-01-13 RX ADMIN — INSULIN LISPRO 5 UNITS: 100 INJECTION, SOLUTION INTRAVENOUS; SUBCUTANEOUS at 17:35

## 2023-01-13 RX ADMIN — Medication 1 TABLET: at 08:15

## 2023-01-13 RX ADMIN — INSULIN LISPRO 8 UNITS: 100 INJECTION, SOLUTION INTRAVENOUS; SUBCUTANEOUS at 20:40

## 2023-01-13 RX ADMIN — INSULIN LISPRO 8 UNITS: 100 INJECTION, SOLUTION INTRAVENOUS; SUBCUTANEOUS at 12:20

## 2023-01-13 RX ADMIN — HYDROCHLOROTHIAZIDE 50 MG: 25 TABLET ORAL at 08:16

## 2023-01-13 RX ADMIN — INSULIN DETEMIR 35 UNITS: 100 INJECTION, SOLUTION SUBCUTANEOUS at 08:15

## 2023-01-13 RX ADMIN — INSULIN LISPRO 5 UNITS: 100 INJECTION, SOLUTION INTRAVENOUS; SUBCUTANEOUS at 08:15

## 2023-01-13 RX ADMIN — INSULIN LISPRO 8 UNITS: 100 INJECTION, SOLUTION INTRAVENOUS; SUBCUTANEOUS at 08:16

## 2023-01-13 RX ADMIN — Medication 250 MG: at 20:40

## 2023-01-13 RX ADMIN — Medication 5000 UNITS: at 08:15

## 2023-01-13 NOTE — PROGRESS NOTES
[facility] Medicine Services  PROGRESS NOTE    Patient Name: Maura Leon  : 1960  MRN: [id number]    Date of Admission: 2023  Primary Care Physician: Emanuel Fuentes DPM    Subjective   Subjective     CC:  Follow up diabetic ulcer     HPI:  Patient seen resting in bed in no apparent distress. No acute events overnight per nursing. Notes that PT wound changed her dressing this morning. She continues to await insurance approval for rehab.     ROS:  Gen- No fevers, chills  CV- No chest pain, palpitations  Resp- No cough, dyspnea  GI- No N/V/D, abd pain    Objective   Objective     Vital Signs:   Temp:  [96.5 °F (35.8 °C)-98.5 °F (36.9 °C)] 96.5 °F (35.8 °C)  Heart Rate:  [88-94] 88  Resp:  [16-18] 18  BP: (117-139)/(58-84) 131/58     Physical Exam:  Constitutional: No acute distress, awake, alert  HENT: NCAT, mucous membranes moist  Respiratory: Clear to auscultation bilaterally, respiratory effort normal   Cardiovascular: RRR, no murmurs, cap refill brisk on RA   Gastrointestinal: Positive bowel sounds, soft, nontender, nondistended  Musculoskeletal: No LE edema, RLE dressing in place, wound vac in place   Psychiatric: Appropriate affect, cooperative  Neurologic: Oriented x 3, moves all extremities, speech clear  Skin: warm, dry, no visible rash     Results Reviewed:  LAB RESULTS:      Lab 23  0353   WBC 8.53 13.24*   HEMOGLOBIN 11.4* 11.7*   HEMATOCRIT 34.7 36.0   PLATELETS 313 275   NEUTROS ABS  --  11.18*   IMMATURE GRANS (ABS)  --  0.08*   LYMPHS ABS  --  0.84   MONOS ABS  --  0.96*   EOS ABS  --  0.12   MCV 96.7 97.0         Lab 236 23  0353   SODIUM 137 135*   POTASSIUM 3.9 3.9   CHLORIDE 99 102   CO2 28.0 19.0*   ANION GAP 10.0 14.0   BUN 15 16   CREATININE 1.00 0.96   EGFR 64.2 67.5   GLUCOSE 352* 211*   CALCIUM 9.2 8.8         Lab 23   TOTAL PROTEIN 6.1   ALBUMIN 3.2*   GLOBULIN 2.9   ALT (SGPT) 43*   AST (SGOT) 22    BILIRUBIN 0.6   ALK PHOS 98                 Lab 01/06/23  1155   ABO TYPING O   RH TYPING Positive   ANTIBODY SCREEN Negative         Brief Urine Lab Results     None          Microbiology Results Abnormal     Procedure Component Value - Date/Time    Anaerobic Culture - Tissue, Toe, Right [[id number]]  (Normal) Collected: 01/06/23 1832    Lab Status: Final result Specimen: Tissue from Toe, Right Updated: 01/11/23 0754     Anaerobic Culture No anaerobes isolated at 5 days    Anaerobic Culture - Wound, Toe, Right [[id number]]  (Normal) Collected: 01/06/23 1733    Lab Status: Final result Specimen: Wound from Toe, Right Updated: 01/11/23 0754     Anaerobic Culture No anaerobes isolated at 5 days    Blood Culture - Blood, Arm, Left [[id number]]  (Normal) Collected: 01/05/23 2105    Lab Status: Final result Specimen: Blood from Arm, Left Updated: 01/10/23 2131     Blood Culture No growth at 5 days    Blood Culture - Blood, Arm, Left [[id number]]  (Normal) Collected: 01/05/23 1758    Lab Status: Final result Specimen: Blood from Arm, Left Updated: 01/10/23 1815     Blood Culture No growth at 5 days    Wound Culture - Swab, Foot, Right [[id number]] Collected: 01/05/23 2201    Lab Status: Final result Specimen: Swab from Foot, Right Updated: 01/08/23 0730     Wound Culture Moderate growth (3+) Normal Skin Kayli     Gram Stain No WBCs or organisms seen          No radiology results from the last 24 hrs        I have reviewed the medications:  Scheduled Meds:artificial tears, , Both Eyes, Nightly  aspirin, 81 mg, Oral, Daily  atorvastatin, 10 mg, Oral, Daily  Calcium Carb-Cholecalciferol, 1 tablet, Oral, Daily  castor oil-balsam peru, 1 application, Topical, Q12H  cefTRIAXone, 2 g, Intravenous, Q24H  cetirizine, 5 mg, Oral, Daily  folic acid, 0.5 mg, Oral, Daily  heparin (porcine), 5,000 Units, Subcutaneous, Q12H  hydroCHLOROthiazide, 50 mg, Oral, Daily  insulin detemir, 35 Units, Subcutaneous, Q12H  insulin lispro, 0-24 Units,  Subcutaneous, 4x Daily With Meals & Nightly  Insulin Lispro, 5 Units, Subcutaneous, TID With Meals  nystatin, , Topical, Q8H  pantoprazole, 40 mg, Oral, Q AM  saccharomyces boulardii, 250 mg, Oral, BID  sodium chloride, 10 mL, Intravenous, Q12H  sodium chloride, 10 mL, Intravenous, Q12H  vitamin D3, 5,000 Units, Oral, Daily      Continuous Infusions:   PRN Meds:.•  [DISCONTINUED] acetaminophen **OR** acetaminophen **OR** acetaminophen  •  acetaminophen  •  betamethasone (augmented)  •  dextrose  •  dextrose  •  glucagon (human recombinant)  •  HYDROcodone-acetaminophen  •  HYDROmorphone **AND** naloxone  •  Morphine **AND** naloxone  •  ondansetron **OR** ondansetron  •  potassium chloride **OR** potassium chloride **OR** potassium chloride  •  sodium chloride  •  sodium chloride  •  sodium chloride    Assessment & Plan   Assessment & Plan     Active Hospital Problems    Diagnosis  POA   • **Sepsis ([facility]) [A41.9]  Yes   • Ulcer of right foot ([facility]) [L97.519]  Yes   • Type 2 diabetes mellitus (HCC) [E11.9]  Yes   • Psoriatic arthritis (HCC) [L40.50]  Yes   • Immunocompromised (HCC) [D84.9]  Yes   • GERD (gastroesophageal reflux disease) [K21.9]  Yes   • Cellulitis of right foot [L03.115]  Unknown      Resolved Hospital Problems   No resolved problems to display.        Brief Hospital Course to date:  Maura Leon is a 62 y.o. female with history of type 2 diabetes, psoriatic arthritis on immunosuppressive medications who presents with a right foot ulcer.  Patient was noted to have a leukocytosis of 20,000, and elevated procalcitonin and elevated CRP.  X-ray of right foot showed soft tissue swelling throughout the forefoot and midfoot jesting cellulitis, suggestion of gas production the soft tissues of the first digit and extending towards the second digit. MRI of the right foot no abscess or osteomyelitis. She underwent R 2nd toe transmetatarsal amputation with Dr. Raygoza on 1/6/2023. Infectious disease was consulted  and recommended to continue IV Rocephin x6 weeks. PT/OT recommended inpatient rehab. CM has arranged for rehab at [facility].      This patient's problems and plans were partially entered by my partner and updated as appropriate by me 01/13/23.    Sepsis secondary to right foot ulcer with cellulitis in the setting of type 2 diabetes, Immunocompromised state  -S/p R 2nd toe transmetatarsal amputation with Dr. Raygoza on 1/6/2023  -Continue zosyn x6 weeks  -MRSA PCR +  -Wound culture with alpha strep   --Wound VAC in place.  --Holding methotrexate  --PICC order placed  --Transfer to [facility] is pending insurance approval  --AM labs      T2DM w/ A1c 8.2  --Patient takes Tresiba 60 units AM and 80 units PM  --Increased Levemir 35 units Q12H; adjust as needed  --Mealtime bolus Humalog 3 units before meals with hold parameters     Acute anemia-stable  --D/t postop blood loss; admitting Hgb 13.1  --Hgb 11.4 at last check      Psoriatic arthritis   -on methotrexate: hold for now      GERD  -PPI      Hyperlipidemia  -continue statin      DVT prophylaxis:  scds to LLE      Expected Discharge Location and Transportation: home vs rehab  Expected Discharge        Expected Discharge Date and Time      Expected Discharge Date Expected Discharge Time     Jan 13, 2023            DVT prophylaxis:  Medical and mechanical DVT prophylaxis orders are present.     AM-PAC 6 Clicks Score (PT): 18 (01/12/23 2000)    CODE STATUS:   Code Status and Medical Interventions:   Ordered at: 01/05/23 1369     Level Of Support Discussed With:    Patient     Code Status (Patient has no pulse and is not breathing):    CPR (Attempt to Resuscitate)     Medical Interventions (Patient has pulse or is breathing):    Full Support       Tab Elder, APRN  01/13/23

## 2023-01-13 NOTE — NURSING NOTE
Follow up for suspected DTPI to posterior thighs.    Wound presentation of bilateral posterior liner medical device related injuries show improvement from previous assessment.  The left linear wound has lightened significantly and is multicolored similarly to a bruise.  The linear wounds on the right posterior thigh are also lightening and are all slow to myra at this time.  The wound beds themselves are red. patient stated that she was laying on a bedpan for some time which is likely what contributed to the injuries.  Recommend continuing to apply Venelex to the wound and cover with an Optifoam dressing.            Sher Delaney RN, BSN, CCRN, CWOCN  Wound, Ostomy and Continence (WOC) Department  [facility]

## 2023-01-13 NOTE — PLAN OF CARE
Goal Outcome Evaluation:  Plan of Care Reviewed With: patient   Slept well, VSS, home c-pap in use. No complaints voiced, dressing remains intact on the right foot with heel boots in use. Will continue with the plan of care .     Progress: improving

## 2023-01-13 NOTE — PLAN OF CARE
Goal Outcome Evaluation:  Plan of Care Reviewed With: patient           Outcome Evaluation: R foot dressing changed today and wound cont to show excellent granulation to wound base at this point. Some bone and fascia remains exposed at this time and pt will benefit from cont use of wound vac at d/c to help encourage further granulation and improve wound healing.

## 2023-01-13 NOTE — CASE MANAGEMENT/SOCIAL WORK
Discharge Planning Assessment  [facility]     Patient Name: Maura Leon  MRN: [id number]  Today's Date: 1/13/2023    Admit Date: 1/5/2023    Plan: [facility] pending insurance approval   Discharge Needs Assessment    No documentation.                Discharge Plan     Row Name 01/13/23 1531       Plan    Plan [facility] pending insurance approval    Patient/Family in Agreement with Plan yes    Plan Comments I've spoken with Yoselin claudio with [facility] multiple times today regarding insurance approval with [employer]. Explained to Mrs Leon at the bedside that it's between [facility] and [facility] and apologized for the delay, but there's nothing that CM can do at this point except wait. Mrs Leon contacted [facility] and apparently didn't get a quicker answer. A covid won't be needed unless she goes to [facility]. Called and spoke with CM Marjorie MARTINEZ who will be working this w/e and provided updates. I went ahead and scheduled transport with [facility] @ 1:30. CM will complete a final note. Yoselin Claudio, with [facility] is working this weekend and will be the point of contact.    Final Discharge Disposition Code 30 - still a patient              Continued Care and Services - Admitted Since 1/5/2023     Destination     Service Provider Request Status Selected Services Address Phone Fax Patient Preferred    [facility] Pending - No Request Sent N/A [address] [facility] [id number] [phone] --       Internal Comment last updated by Jazmin Sims, RN 1/9/2023 1021    1/9 referral called                         Expected Discharge Date and Time     Expected Discharge Date Expected Discharge Time    Jan 13, 2023          Demographic Summary    No documentation.                Functional Status    No documentation.                Psychosocial    No documentation.                Abuse/Neglect    No documentation.                Legal    No documentation.                Substance Abuse    No documentation.                 Patient Forms    No documentation.                   Jazmin Sims RN

## 2023-01-13 NOTE — THERAPY WOUND CARE TREATMENT
Acute Care - Wound/Debridement Treatment Note  [facility]     Patient Name: Maura Leon  : 1960  MRN: [id number]  Today's Date: 2023                Admit Date: 2023    Visit Dx:    ICD-10-CM ICD-9-CM   1. Cellulitis of right foot  L03.115 682.7   2. History of diabetes mellitus, type II  Z86.39 V12.29       Patient Active Problem List   Diagnosis   • Sepsis (HCC)   • Ulcer of right foot (HCC)   • Type 2 diabetes mellitus (HCC)   • Psoriatic arthritis (HCC)   • Immunocompromised (HCC)   • GERD (gastroesophageal reflux disease)   • Cellulitis of right foot        Past Medical History:   Diagnosis Date   • Arthritis    • Diabetes mellitus (HCC)    • GERD (gastroesophageal reflux disease)    • Irritable bowel    • Sleep apnea         Past Surgical History:   Procedure Laterality Date   • AMPUTATION DIGIT Right 2023    Procedure: FOOT SURGICAL EXPLORATION WITH TOE AMPUTATION RIGHT;  Surgeon: Javon Raygoza MD;  Location: [facility];  Service: General;  Laterality: Right;   • TOE SURGERY Right 2022           Wound 23 2315 Right posterior plantar Diabetic Ulcer (Active)   Dressing Appearance dry;intact;no drainage 23 1600       Wound 23 1733 Right anterior foot Incision (Active)   Dressing Appearance dry;intact 23 0800   Closure SORAIDA 23 0600   Base moist;pink;red;subcutaneous;yellow;exposed structure 23 08   Periwound dry;intact;pink 23 08   Periwound Temperature warm 23 08   Periwound Skin Turgor soft 23 08   Edges open 23 1400   Drainage Characteristics/Odor serosanguineous 23 08   Drainage Amount small 23 0800   Care, Wound irrigated with;sterile normal saline;debrided 23 08   Dressing Care dressing changed;foam;low-adherent 23 08   Periwound Care dry periwound area maintained 23 0800       Wound 23 0940 Bilateral posterior thigh Pressure Injury (Active)   Pressure Injury Stage DTPI  01/13/23 0000   Dressing Appearance open to air 01/13/23 0000   Closure None 01/13/23 0200   Base scab 01/13/23 0200   Drainage Amount none 01/13/23 0200         WOUND DEBRIDEMENT  Total area of Debridement: ~5cm2  Debridement Site 1  Location- Site 1: Amputation site  Selective Debridement- Site 1: Wound Surface <20cmsq  Instruments- Site 1: tweezers  Excised Tissue Description- Site 1: minimum, slough  Bleeding- Site 1: none               PT Assessment (last 12 hours)     PT Evaluation and Treatment     Row Name 01/13/23 0800          Physical Therapy Time and Intention    Subjective Information complains of;weakness;fatigue  -MF     Document Type therapy note (daily note);wound care  -MF     Mode of Treatment individual therapy;physical therapy  -     Row Name 01/13/23 0800          Pain    Pretreatment Pain Rating 0/10 - no pain  -MF     Posttreatment Pain Rating 0/10 - no pain  -MF     Pain Intervention(s) Repositioned  -MF     Row Name             Wound 01/05/23 2315 Right posterior plantar Diabetic Ulcer    Wound - Properties Group Placement Date: 01/05/23  -DP Placement Time: 2315  -DP Present on Hospital Admission: Y  -DP Side: Right  -DP Orientation: posterior  -DP Location: plantar  -DP Primary Wound Type: Diabetic ulc  -DP    Retired Wound - Properties Group Placement Date: 01/05/23  -DP Placement Time: 2315  -DP Present on Hospital Admission: Y  -DP Side: Right  -DP Orientation: posterior  -DP Location: plantar  -DP Primary Wound Type: Diabetic ulc  -DP    Retired Wound - Properties Group Date first assessed: 01/05/23  -DP Time first assessed: 2315  -DP Present on Hospital Admission: Y  -DP Side: Right  -DP Location: plantar  -DP Primary Wound Type: Diabetic ulc  -DP    Row Name 01/13/23 0800          Wound 01/06/23 1733 Right anterior foot Incision    Wound - Properties Group Placement Date: 01/06/23  -EQ Placement Time: 1733  -EQ Present on Hospital Admission: N  -EQ Side: Right  -EQ Orientation:  anterior  -EQ Location: foot  -EQ Primary Wound Type: Incision  -EQ    Dressing Appearance dry;intact  -MF     Base moist;pink;red;subcutaneous;yellow;exposed structure  bone and fascia to wound base  -MF     Periwound dry;intact;pink  -MF     Periwound Temperature warm  -MF     Periwound Skin Turgor soft  -MF     Drainage Characteristics/Odor serosanguineous  -MF     Drainage Amount small  -MF     Care, Wound irrigated with;sterile normal saline;debrided  -MF     Dressing Care dressing changed;foam;low-adherent  HFBc to pack wound with optifoam Ag with kerlix to secure.  -MF     Periwound Care dry periwound area maintained  -MF     Retired Wound - Properties Group Placement Date: 01/06/23  -EQ Placement Time: 1733  -EQ Present on Hospital Admission: N  -EQ Side: Right  -EQ Orientation: anterior  -EQ Location: foot  -EQ Primary Wound Type: Incision  -EQ    Retired Wound - Properties Group Date first assessed: 01/06/23  -EQ Time first assessed: 1733  -EQ Present on Hospital Admission: N  -EQ Side: Right  -EQ Location: foot  -EQ Primary Wound Type: Incision  -EQ    Row Name             Wound 01/11/23 0940 Bilateral posterior thigh Pressure Injury    Wound - Properties Group Placement Date: 01/11/23  -MFA Placement Time: 0940  -MFA Present on Hospital Admission: N  -MFA Side: Bilateral  -MFA Orientation: posterior  -MFA Location: thigh  -MFA Primary Wound Type: Pressure inj  -MFA, suspected DTPI     Retired Wound - Properties Group Placement Date: 01/11/23  -MFA Placement Time: 0940  -MFA Present on Hospital Admission: N  -MFA Side: Bilateral  -MFA Orientation: posterior  -MFA Location: thigh  -MFA Primary Wound Type: Pressure inj  -MFA, suspected DTPI     Retired Wound - Properties Group Date first assessed: 01/11/23  -MFA Time first assessed: 0940  -MFA Present on Hospital Admission: N  -MFA Side: Bilateral  -MFA Location: thigh  -MFA Primary Wound Type: Pressure inj  -MFA, suspected DTPI     Row Name 01/13/23 0800           Coping    Observed Emotional State calm;quiet  -     Verbalized Emotional State acceptance  -MF     Trust Relationship/Rapport care explained  -     Row Name 01/13/23 0800          Plan of Care Review    Plan of Care Reviewed With patient  -MF     Outcome Evaluation R foot dressing changed today and wound cont to show excellent granulation to wound base at this point. Some bone and fascia remains exposed at this time and pt will benefit from cont use of wound vac at d/c to help encourage further granulation and improve wound healing.  -     Row Name 01/13/23 0800          Positioning and Restraints    Pre-Treatment Position in bed  -     Post Treatment Position bed  -     In Bed supine;call light within reach  -           User Key  (r) = Recorded By, (t) = Taken By, (c) = Cosigned By    Initials Name Provider Type    MF Kevin Marin, PT Physical Therapist    Nai Padilla, RN Registered Nurse    Aicha Thomas RN Registered Nurse    Destiny Carmichael RN Registered Nurse              Physical Therapy Education     Title: PT OT SLP Therapies (In Progress)     Topic: Physical Therapy (Done)     Point: Mobility training (Done)     Learning Progress Summary           Patient Acceptance, E, VU by AE at 1/12/2023 1525    Acceptance, E, VU by AE at 1/10/2023 1535    Acceptance, E, VU by AE at 1/9/2023 1108    Acceptance, E, VU,NR by KG at 1/7/2023 1529                   Point: Home exercise program (Done)     Learning Progress Summary           Patient Acceptance, E, VU by AE at 1/12/2023 1525    Acceptance, E, VU by AE at 1/10/2023 1535    Acceptance, E, VU by AE at 1/9/2023 1108    Acceptance, E, VU,NR by KG at 1/7/2023 1529                   Point: Body mechanics (Done)     Learning Progress Summary           Patient Acceptance, E, VU by AE at 1/12/2023 1525    Acceptance, E, VU by AE at 1/10/2023 1535    Acceptance, E, VU by AE at 1/9/2023 1108    Acceptance, E, VU,NR by KG at  1/7/2023 1529                   Point: Precautions (Done)     Learning Progress Summary           Patient Acceptance, E, VU by AE at 1/12/2023 1525    Acceptance, E, VU by AE at 1/10/2023 1535    Acceptance, E, VU by AE at 1/9/2023 1108    Acceptance, E, VU,NR by KG at 1/7/2023 1529                               User Key     Initials Effective Dates Name Provider Type Discipline    KG 01/04/23 -  Kaity Narvaez Physical Therapist PT    AE 09/21/21 -  Brian Horton, SHARLA Physical Therapist PT                Recommendation and Plan  Anticipated Equipment Needs at Discharge (PT): front wheeled walker  Anticipated Discharge Disposition (PT): inpatient rehabilitation facility  Planned Therapy Interventions (PT): wound care, patient/family education  Therapy Frequency (PT): daily  Plan of Care Reviewed With: patient           Outcome Evaluation: R foot dressing changed today and wound cont to show excellent granulation to wound base at this point. Some bone and fascia remains exposed at this time and pt will benefit from cont use of wound vac at d/c to help encourage further granulation and improve wound healing.  Plan of Care Reviewed With: patient            Time Calculation   PT Charges     Row Name 01/13/23 0800             Time Calculation    Start Time 0800  -MF      PT Goal Re-Cert Due Date 01/17/23  -MF         Untimed Charges    10020-Tzwyztdfb debridement 25  -MF         Total Minutes    Untimed Charges Total Minutes 25  -MF       Total Minutes 25  -MF            User Key  (r) = Recorded By, (t) = Taken By, (c) = Cosigned By    Initials Name Provider Type     Kevin Marin, PT Physical Therapist                  Therapy Charges for Today     Code Description Service Date Service Provider Modifiers Qty    [id number] HC NONSELECTIVE DEBRIDEMENT 1/12/2023 Kevin Marin, PT GP 1    [id number]  KELLEN DEBRIDE OPEN WOUND UP TO 20CM 1/13/2023 Kevin Marin, PT GP 1            PT G-Codes  Outcome  Measure Options: AM-PAC 6 Clicks Basic Mobility (PT)  AM-PAC 6 Clicks Score (PT): 18  AM-PAC 6 Clicks Score (OT): 15       Kevin Marin, PT  1/13/2023

## 2023-01-13 NOTE — PROGRESS NOTES
"Maura Leon  1960  [id number]     Chief Complaint:  Right foot infection    Reason for Consultation: right foot infection    History of present illness:     Patient is a 62 y.o.  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  Follows with podiatry in [place]; she has diabetes with chronic sensory neuropathy and has not noticed any recent exposures.  No specific blunt force or penetrating trauma.  She has not stepped on anything she knows of.  No animal insect or arthropod bite.  No fresh/brackish/salt water exposure.  No prior history MRSA VRE C. difficile or ESBL/KP C/CRE organisms.    She is admitted on January 5, 2023 with acute deterioration at the right foot.  She had just noticed a wound on the plantar side of her foot when she had spontaneous drainage and does not know how long it had been there.  She has no pain because of her sensory neuropathy.  She developed acute redness/swelling and was told to come to the emergency room by her podiatrist.  She was noted to have fever/leukocytosis with elevated procalcitonin, sepsis per admission H&P and abnormal MRI with plantar foot wound/ulceration over the second MTP joint with adjacent soft tissue and inflammatory phlegmon but no focal fluid collection per radiology and no osteomyelitis per radiology.  Initial x-ray did raise concern for gas in the soft tissue per radiology.  Dr. Raygoza has seen the patient and he is planning for surgical debridement on January 6 1/6/23 surgery by Dr Raygoza:  \"Pre-op Diagnosis: Gas gangrene of the right foot involving the plantar surface of the right second toe metatarsal head area  Post-op Diagnosis:   Same with extensive necrotizing fasciitis of the right second toe involving the flexor and extensor tendons.\"    \"Procedure(s): Right second " "toe transmetatarsal amputation through the distal third of the second metatarsal bone with extensive sharp soft tissue debridement.  Wound left open to heal by secondary intent/wound VAC therapy\"    **operative cultures from foot with S Mitis, anaerobic culture negative    1/6 MRSA surveillance culture positive    1/13/23   Case management trying to make plans; Possible [facility] per notes. No fever;  postop no pain at the right foot with her sensory neuropathy.  She has redness/swelling  Generally improving since surgery.  She denies any other specific focal complaints    No headache photophobia or neck stiffness.  No shortness of breath cough or hemoptysis.  No nausea vomiting diarrhea or abdominal pain.  No dysuria hematuria or pyuria.  No skin rash    Review of Systems    1/13/23 per nursing,     Constitutional-- No  chills or sweats.  Appetite better  Heent-- No new vision, hearing or throat complaints.  No epistaxis or oral sores.  Denies odynophagia or dysphagia.  No flashers, floaters or eye pain. No odynophagia or dysphagia. No headache, photophobia or neck stiffness.  CV-- No chest pain, palpitation or syncope  Resp-- No SOB/cough/Hemoptysis  GI- No nausea, vomiting, or diarrhea.  No hematochezia, melena, or hematemesis. Denies jaundice or chronic liver disease.  -- No dysuria, hematuria, or flank pain.  Denies hesitancy, urgency or flank pain.  Lymph- no swollen lymph nodes in neck/axilla or groin.   Heme- No active bruising or bleeding; no Hx of DVT or PE.  MS-- no acute swelling or pain in the bones or joints of arms/legs aside from above.  No new back pain.  Neuro-- No acute focal weakness or numbness in the arms or legs.  No seizures.    Full 12 point review of systems reviewed and negative otherwise for acute complaints, except for above    Physical Exam: Nursing/chaperone present  Vital Signs   /58 (BP Location: Right arm, Patient Position: Lying)   Pulse 88   Temp 96.5 °F (35.8 °C) (Oral)   " "Resp 18   Ht 167.6 cm (66\")   Wt 116 kg (255 lb)   SpO2 97%   BMI 41.16 kg/m²     GENERAL: awake, in no acute distress. Room air  HEENT: Normocephalic, atraumatic.   . No conjunctival injection. No icterus. Oropharynx clear without evidence of thrush or exudate. No evidence of peridontal disease.    NECK: Supple without nuchal rigidity. No mass.   HEART: RRR; No murmur, rubs, gallops.   LUNGS: Clear to auscultation bilaterally without wheezing, rales, rhonchi. Normal respiratory effort. Nonlabored. No dullness.  ABDOMEN: Soft, nontender, nondistended. Positive bowel sounds. No rebound or guarding. NO mass or HSM.  EXT:  No cyanosis, clubbing . No cord.  See below   MSK: FROM without joint effusions noted arms/legs.    SKIN: Warm and dry without cutaneous eruptions on Inspection/palpation.  See below  NEURO: awake    Right foot surgical site noted with VAC; d/w PT and granulation increased but still with exposed bone at base of wound;  Redness at foot less.  No discrete mass bulge or fluctuance.  No crepitus or bulla.  Partial right hallux amputation noted with healed surgical site.  Onychomycosis noted with thickened nails; no new purulence    No peripheral stigmata/phenomena of endocarditis    IV without obvious redness or drainage    Laboratory Data    Results from last 7 days   Lab Units 01/09/23  0256 01/07/23  0353   WBC 10*3/mm3 8.53 13.24*   HEMOGLOBIN g/dL 11.4* 11.7*   HEMATOCRIT % 34.7 36.0   PLATELETS 10*3/mm3 313 275     Results from last 7 days   Lab Units 01/09/23  0256   SODIUM mmol/L 137   POTASSIUM mmol/L 3.9   CHLORIDE mmol/L 99   CO2 mmol/L 28.0   BUN mg/dL 15   CREATININE mg/dL 1.00   GLUCOSE mg/dL 352*   CALCIUM mg/dL 9.2     Results from last 7 days   Lab Units 01/09/23  0256   ALK PHOS U/L 98   BILIRUBIN mg/dL 0.6   ALT (SGPT) U/L 43*   AST (SGOT) U/L 22               Estimated Creatinine Clearance: 76.5 mL/min (by C-G formula based on SCr of 1 " mg/dL).      Microbiology:      Radiology:  Imaging Results (Last 72 Hours)     ** No results found for the last 72 hours. **            Impression:     --Acute sepsis present on admission per medicine notes, in the setting of chronic immunosuppression/psoriatic arthritis and acute deterioration in her right foot with right foot infection most likely source.  Significant leukocytosis with abnormal procalcitonin and abnormal imaging at admit.     Timing/option of threshold including extent for any further debridement/amputation at discretion of Dr. Raygoza; surgery 1/6 as above    --acute /severe right foot infection with necrotizing fasciitis at surgery as above; exposed MT bone at base of wound    **Cx with S Mitis so far PCN sensitive;  Anaerobic culture negative    --MRSA surveillance culture positivity; no MRSA at her foot per microbiology    -- Psoriatic arthritis with chronic methotrexate, currently on hold by medicine team.    -- Diabetes with chronic sensory neuropathy.  She understands the importance of protecting her feet.  You need to tightly control blood sugar to give best chance for healing    -- Antibiotic allergies as above to clindamycin, doxycycline, sulfa and fluoroquinolones      PLAN:      -- IV  Rocephin; I anticipate IV abx  6 weeks from surgery depending on clinical course/tolerability, etc..    -- Check/review labs cultures and scans    -- Partial history per nursing staff    -- Discussed with microbiology    -- Highly complex set of issues with high risk for further serious morbidity and other serious sequela    -- Discussed with Dr. Raygoza and PT.  VAC at present      **pathology MT no significant inflmmation at bone per pathology but exposed bone at wound base may need to treat as MT osteomyelitis equivalent; my partners can continue to follow at [facility]; case management continue to work on options    Copied text in this note has been reviewed and is accurate as of 01/13/23.        Kevin VELA  MD [place]  1/13/2023

## 2023-01-13 NOTE — PROGRESS NOTES
Cardiothoracic Surgery Progress Note      POD #: 7-transmetatarsal amputation right second toe with extensive soft tissue debridement for gas gangrene.     LOS: 8 days      Subjective: Asleep, resting comfortably, vital signs on monitor stable    Objective:  Vital Signs vital signs below normal T-max past 24 hours 98.5 °F  Temp:  [96.5 °F (35.8 °C)-98.5 °F (36.9 °C)] 96.5 °F (35.8 °C)  Heart Rate:  [81-94] 88  Resp:  [16-18] 18  BP: (107-139)/(58-84) 131/58    Physical Exam:   General Appearance: Asleep resting comfortably   Lungs:   Heart:   Skin:   Incision: Prior amputation site has wound VAC in place and dry dressing     Results:  Results from last 7 days   Lab Units 01/09/23  0256   WBC 10*3/mm3 8.53   HEMOGLOBIN g/dL 11.4*   HEMATOCRIT % 34.7   PLATELETS 10*3/mm3 313     Results from last 7 days   Lab Units 01/09/23  0256   SODIUM mmol/L 137   POTASSIUM mmol/L 3.9   CHLORIDE mmol/L 99   CO2 mmol/L 28.0   BUN mg/dL 15   CREATININE mg/dL 1.00   GLUCOSE mg/dL 352*   CALCIUM mg/dL 9.2         Assessment: #1 postop day 7 right second toe transmetatarsal amputation with extensive soft tissue debridement wound VAC in place  2 diabetes mellitus with diabetic neuropathy  Number 3:  Chronic plantar ulceration of the metatarsal head requiring this amputation.  Due to necrotizing fasciitis/gangrene      Plan: Wound VAC management PT wound care.  Antibiotics per infectious disease.  Medical manage per hospitalist.      Javon Raygoza MD - 01/13/23 - 05:43 EST

## 2023-01-13 NOTE — PROGRESS NOTES
Clinical Nutrition     Patient Name: Maura Leon  YOB: 1960  MRN: [id number]  Date of Encounter: 23 09:18 EST  Admission date: 2023    Reason for Visit   LOS    EMR reviewed    Yes    Diet Nutrition Related History         Current Nutrition Prescription    Diet: Regular/House Diet, Diabetic Diets; Consistent Carbohydrate; Texture: Regular Texture (IDDSI 7); Fluid Consistency: Thin (IDDSI 0)  No active supplement orders      Average Intake from [employer]% x last 6 recorded meals    Actions:    Follow treatment progress, Care plan reviewed    Monitor Per Protocol    Milagros Olivares,   Time Spent: 15 minutes

## 2023-01-14 VITALS
RESPIRATION RATE: 19 BRPM | HEIGHT: 66 IN | WEIGHT: 255 LBS | SYSTOLIC BLOOD PRESSURE: 142 MMHG | TEMPERATURE: 97.7 F | BODY MASS INDEX: 40.98 KG/M2 | OXYGEN SATURATION: 96 % | DIASTOLIC BLOOD PRESSURE: 91 MMHG | HEART RATE: 113 BPM

## 2023-01-14 LAB
ANION GAP SERPL CALCULATED.3IONS-SCNC: 13 MMOL/L (ref 5–15)
BUN SERPL-MCNC: 15 MG/DL (ref 8–23)
BUN/CREAT SERPL: 20 (ref 7–25)
CALCIUM SPEC-SCNC: 9.4 MG/DL (ref 8.6–10.5)
CHLORIDE SERPL-SCNC: 102 MMOL/L (ref 98–107)
CO2 SERPL-SCNC: 22 MMOL/L (ref 22–29)
CREAT SERPL-MCNC: 0.75 MG/DL (ref 0.57–1)
DEPRECATED RDW RBC AUTO: 44.9 FL (ref 37–54)
EGFRCR SERPLBLD CKD-EPI 2021: 90.7 ML/MIN/1.73
ERYTHROCYTE [DISTWIDTH] IN BLOOD BY AUTOMATED COUNT: 13.2 % (ref 12.3–15.4)
GLUCOSE BLDC GLUCOMTR-MCNC: 213 MG/DL (ref 70–130)
GLUCOSE BLDC GLUCOMTR-MCNC: 292 MG/DL (ref 70–130)
GLUCOSE SERPL-MCNC: 206 MG/DL (ref 65–99)
HCT VFR BLD AUTO: 38 % (ref 34–46.6)
HGB BLD-MCNC: 12.6 G/DL (ref 12–15.9)
MCH RBC QN AUTO: 31.4 PG (ref 26.6–33)
MCHC RBC AUTO-ENTMCNC: 33.2 G/DL (ref 31.5–35.7)
MCV RBC AUTO: 94.8 FL (ref 79–97)
PLATELET # BLD AUTO: 341 10*3/MM3 (ref 140–450)
PMV BLD AUTO: 9.3 FL (ref 6–12)
POTASSIUM SERPL-SCNC: 3.6 MMOL/L (ref 3.5–5.2)
RBC # BLD AUTO: 4.01 10*6/MM3 (ref 3.77–5.28)
SODIUM SERPL-SCNC: 137 MMOL/L (ref 136–145)
WBC NRBC COR # BLD: 9.21 10*3/MM3 (ref 3.4–10.8)

## 2023-01-14 PROCEDURE — 63710000001 INSULIN LISPRO (HUMAN) PER 5 UNITS: Performed by: NURSE PRACTITIONER

## 2023-01-14 PROCEDURE — 80048 BASIC METABOLIC PNL TOTAL CA: CPT | Performed by: NURSE PRACTITIONER

## 2023-01-14 PROCEDURE — 99024 POSTOP FOLLOW-UP VISIT: CPT | Performed by: THORACIC SURGERY (CARDIOTHORACIC VASCULAR SURGERY)

## 2023-01-14 PROCEDURE — 82962 GLUCOSE BLOOD TEST: CPT

## 2023-01-14 PROCEDURE — 63710000001 INSULIN DETEMIR PER 5 UNITS: Performed by: NURSE PRACTITIONER

## 2023-01-14 PROCEDURE — 99239 HOSP IP/OBS DSCHRG MGMT >30: CPT | Performed by: NURSE PRACTITIONER

## 2023-01-14 PROCEDURE — 25010000002 CEFTRIAXONE PER 250 MG: Performed by: INTERNAL MEDICINE

## 2023-01-14 PROCEDURE — 25010000002 HEPARIN (PORCINE) PER 1000 UNITS: Performed by: THORACIC SURGERY (CARDIOTHORACIC VASCULAR SURGERY)

## 2023-01-14 PROCEDURE — 85027 COMPLETE CBC AUTOMATED: CPT | Performed by: NURSE PRACTITIONER

## 2023-01-14 RX ORDER — SACCHAROMYCES BOULARDII 250 MG
250 CAPSULE ORAL 2 TIMES DAILY
Start: 2023-01-14

## 2023-01-14 RX ADMIN — ATORVASTATIN CALCIUM 10 MG: 10 TABLET, FILM COATED ORAL at 08:06

## 2023-01-14 RX ADMIN — PANTOPRAZOLE SODIUM 40 MG: 40 TABLET, DELAYED RELEASE ORAL at 05:41

## 2023-01-14 RX ADMIN — Medication 250 MG: at 08:06

## 2023-01-14 RX ADMIN — Medication 1 TABLET: at 08:06

## 2023-01-14 RX ADMIN — Medication 10 ML: at 11:26

## 2023-01-14 RX ADMIN — INSULIN LISPRO 12 UNITS: 100 INJECTION, SOLUTION INTRAVENOUS; SUBCUTANEOUS at 12:04

## 2023-01-14 RX ADMIN — INSULIN LISPRO 5 UNITS: 100 INJECTION, SOLUTION INTRAVENOUS; SUBCUTANEOUS at 12:04

## 2023-01-14 RX ADMIN — INSULIN DETEMIR 35 UNITS: 100 INJECTION, SOLUTION SUBCUTANEOUS at 08:06

## 2023-01-14 RX ADMIN — Medication 10 ML: at 08:06

## 2023-01-14 RX ADMIN — CETIRIZINE HYDROCHLORIDE 5 MG: 10 TABLET, FILM COATED ORAL at 08:06

## 2023-01-14 RX ADMIN — CASTOR OIL AND BALSAM, PERU 1 APPLICATION: 788; 87 OINTMENT TOPICAL at 08:06

## 2023-01-14 RX ADMIN — HEPARIN SODIUM 5000 UNITS: 5000 INJECTION INTRAVENOUS; SUBCUTANEOUS at 08:07

## 2023-01-14 RX ADMIN — CEFTRIAXONE SODIUM 2 G: 2 INJECTION, POWDER, FOR SOLUTION INTRAMUSCULAR; INTRAVENOUS at 08:07

## 2023-01-14 RX ADMIN — ASPIRIN 81 MG CHEWABLE TABLET 81 MG: 81 TABLET CHEWABLE at 08:06

## 2023-01-14 RX ADMIN — Medication 5000 UNITS: at 08:06

## 2023-01-14 RX ADMIN — INSULIN LISPRO 5 UNITS: 100 INJECTION, SOLUTION INTRAVENOUS; SUBCUTANEOUS at 08:05

## 2023-01-14 RX ADMIN — NYSTATIN: 100000 POWDER TOPICAL at 05:41

## 2023-01-14 RX ADMIN — HYDROCHLOROTHIAZIDE 50 MG: 25 TABLET ORAL at 08:06

## 2023-01-14 RX ADMIN — FOLIC ACID 0.5 MG: 1 TABLET ORAL at 08:06

## 2023-01-14 RX ADMIN — INSULIN LISPRO 8 UNITS: 100 INJECTION, SOLUTION INTRAVENOUS; SUBCUTANEOUS at 08:07

## 2023-01-14 NOTE — PROGRESS NOTES
"Maura Leon  1960  [id number]     Chief Complaint:  Right foot infection    Reason for Consultation: right foot infection    History of present illness:     Patient is a 62 y.o.  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  Follows with podiatry in [place]; she has diabetes with chronic sensory neuropathy and has not noticed any recent exposures.  No specific blunt force or penetrating trauma.  She has not stepped on anything she knows of.  No animal insect or arthropod bite.  No fresh/brackish/salt water exposure.  No prior history MRSA VRE C. difficile or ESBL/KP C/CRE organisms.    She is admitted on January 5, 2023 with acute deterioration at the right foot.  She had just noticed a wound on the plantar side of her foot when she had spontaneous drainage and does not know how long it had been there.  She has no pain because of her sensory neuropathy.  She developed acute redness/swelling and was told to come to the emergency room by her podiatrist.  She was noted to have fever/leukocytosis with elevated procalcitonin, sepsis per admission H&P and abnormal MRI with plantar foot wound/ulceration over the second MTP joint with adjacent soft tissue and inflammatory phlegmon but no focal fluid collection per radiology and no osteomyelitis per radiology.  Initial x-ray did raise concern for gas in the soft tissue per radiology.  Dr. Raygoza has seen the patient and he is planning for surgical debridement on January 6 1/6/23 surgery by Dr Raygoza:  \"Pre-op Diagnosis: Gas gangrene of the right foot involving the plantar surface of the right second toe metatarsal head area  Post-op Diagnosis:   Same with extensive necrotizing fasciitis of the right second toe involving the flexor and extensor tendons.\"    \"Procedure(s): Right second " "toe transmetatarsal amputation through the distal third of the second metatarsal bone with extensive sharp soft tissue debridement.  Wound left open to heal by secondary intent/wound VAC therapy\"    **operative cultures from foot with S Mitis, anaerobic culture negative    1/6 MRSA surveillance culture positive    1/14/23   Case management trying to make plans; Possible [facility] per notes although waiting on insurance/[facility] feedback. No fever;  postop no pain at the right foot with her sensory neuropathy.  She has redness/swelling  Generally improving since surgery.  She denies any other specific focal complaints    No headache photophobia or neck stiffness.  No shortness of breath cough or hemoptysis.  No nausea vomiting diarrhea or abdominal pain.  No dysuria hematuria or pyuria.  No skin rash    Review of Systems    1/14/23 per nursing,     Constitutional-- No  chills or sweats.  Appetite better  Heent-- No new vision, hearing or throat complaints.  No epistaxis or oral sores.  Denies odynophagia or dysphagia.  No flashers, floaters or eye pain. No odynophagia or dysphagia. No headache, photophobia or neck stiffness.  CV-- No chest pain, palpitation or syncope  Resp-- No SOB/cough/Hemoptysis  GI- No nausea, vomiting, or diarrhea.  No hematochezia, melena, or hematemesis. Denies jaundice or chronic liver disease.  -- No dysuria, hematuria, or flank pain.  Denies hesitancy, urgency or flank pain.  Lymph- no swollen lymph nodes in neck/axilla or groin.   Heme- No active bruising or bleeding; no Hx of DVT or PE.  MS-- no acute swelling or pain in the bones or joints of arms/legs aside from above.  No new back pain.  Neuro-- No acute focal weakness or numbness in the arms or legs.  No seizures.    Full 12 point review of systems reviewed and negative otherwise for acute complaints, except for above    Physical Exam: Nursing/chaperone present  Vital Signs   /91 (BP Location: Left arm, Patient Position: " "Lying)   Pulse 113   Temp 97.7 °F (36.5 °C) (Oral)   Resp 19   Ht 167.6 cm (66\")   Wt 116 kg (255 lb)   SpO2 96%   BMI 41.16 kg/m²     GENERAL: awake, in no acute distress. Room air  HEENT: Normocephalic, atraumatic.   . No conjunctival injection. No icterus. Oropharynx clear without evidence of thrush or exudate. No evidence of peridontal disease.    NECK: Supple without nuchal rigidity. No mass.   HEART: RRR; No murmur, rubs, gallops.   LUNGS: Clear to auscultation bilaterally without wheezing, rales, rhonchi. Normal respiratory effort. Nonlabored. No dullness.  ABDOMEN: Soft, nontender, nondistended. Positive bowel sounds. No rebound or guarding. NO mass or HSM.  EXT:  No cyanosis, clubbing . No cord.  See below   MSK: FROM without joint effusions noted arms/legs.    SKIN: Warm and dry without cutaneous eruptions on Inspection/palpation.  See below  NEURO: awake    Right foot surgical site noted with VAC; d/w PT and granulation increased but still with exposed bone at base of wound;  Redness at foot less.  No discrete mass bulge or fluctuance.  No crepitus or bulla.  Partial right hallux amputation noted with healed surgical site.  Onychomycosis noted with thickened nails; no new purulence    No peripheral stigmata/phenomena of endocarditis    IV without obvious redness or drainage    Laboratory Data    Results from last 7 days   Lab Units 01/14/23  0402 01/09/23  0256   WBC 10*3/mm3 9.21 8.53   HEMOGLOBIN g/dL 12.6 11.4*   HEMATOCRIT % 38.0 34.7   PLATELETS 10*3/mm3 341 313     Results from last 7 days   Lab Units 01/14/23  0402   SODIUM mmol/L 137   POTASSIUM mmol/L 3.6   CHLORIDE mmol/L 102   CO2 mmol/L 22.0   BUN mg/dL 15   CREATININE mg/dL 0.75   GLUCOSE mg/dL 206*   CALCIUM mg/dL 9.4     Results from last 7 days   Lab Units 01/09/23  0256   ALK PHOS U/L 98   BILIRUBIN mg/dL 0.6   ALT (SGPT) U/L 43*   AST (SGOT) U/L 22               Estimated Creatinine Clearance: 102 mL/min (by C-G formula based on " SCr of 0.75 mg/dL).      Microbiology:      Radiology:  Imaging Results (Last 72 Hours)     ** No results found for the last 72 hours. **            Impression:     --Acute sepsis present on admission per medicine notes, in the setting of chronic immunosuppression/psoriatic arthritis and acute deterioration in her right foot with right foot infection most likely source.  Significant leukocytosis with abnormal procalcitonin and abnormal imaging at admit.     Timing/option of threshold including extent for any further debridement/amputation at discretion of Dr. Raygoza; surgery 1/6 as above    --acute /severe right foot infection with necrotizing fasciitis at surgery as above; exposed MT bone at base of wound    **Cx with S Mitis so far PCN sensitive;  Anaerobic culture negative    --MRSA surveillance culture positivity; no MRSA at her foot per microbiology    -- Psoriatic arthritis with chronic methotrexate, currently on hold by medicine team.    -- Diabetes with chronic sensory neuropathy.  She understands the importance of protecting her feet.  You need to tightly control blood sugar to give best chance for healing    -- Antibiotic allergies as above to clindamycin, doxycycline, sulfa and fluoroquinolones      PLAN:      -- IV  Rocephin; I anticipate IV abx  6 weeks from surgery depending on clinical course/tolerability, etc..    -- Check/review labs cultures and scans    -- Partial history per nursing staff    -- Discussed with microbiology    -- Highly complex set of issues with high risk for further serious morbidity and other serious sequela    -- Discussed with Dr. Raygoza and PT.  VAC at present      **pathology MT no significant inflmmation at bone per pathology but exposed bone at wound base may need to treat as MT osteomyelitis equivalent; my partners can continue to follow at [facility]; case management continue to work on options, pending    Copied text in this note has been reviewed and is accurate as of  01/14/23.        Kevin Abdul MD  1/14/2023

## 2023-01-14 NOTE — CASE MANAGEMENT/SOCIAL WORK
Case Management Discharge Note      Final Note: Mrs. Leon is being discharged today to [facility].  Per Ally with  they have received insurance approval and a bed is now available.  I have confirmed with Mrs. Leon that she remains agreeable to this bed offer as well as [facility] transportation arranged for today at 1245.   will fax the discharge summary when available to [phone].  Nurse to please call report to [facility] @ [phone].  Please have Mrs. Leon at the [address] at 1235 for 1245 [facility] wheelchair pick-up.  Mrs. Leon denies having any other discharge needs at this time.         Selected Continued Care - Admitted Since 1/5/2023     Destination Coordination complete.    Service Provider Selected Services Address Phone Fax Patient Preferred    [facility] [address] [address] [phone] --       Internal Comment last updated by Jazmin Sims RN 1/9/2023 1021    1/9 referral called                     Durable Medical Equipment    No services have been selected for the patient.              Dialysis/Infusion    No services have been selected for the patient.              Home Medical Care    No services have been selected for the patient.              Therapy    No services have been selected for the patient.              Community Resources    No services have been selected for the patient.              Community & DME    No services have been selected for the patient.                  Transportation Services  Ambulance: [facility]    Final Discharge Disposition Code: 62 - inpatient rehab facility   No

## 2023-01-14 NOTE — PROGRESS NOTES
Cardiothoracic Surgery Progress Note      POD #: 8-transmetatarsal amputation right second toe and extensive soft tissue debridement for gas gangrene     LOS: 9 days      Subjective: Asleep resting comfortably did not awaken her    Objective:  Vital Signs  Temp:  [96.5 °F (35.8 °C)-98.5 °F (36.9 °C)] 96.5 °F (35.8 °C)  Heart Rate:  [84] 84  Resp:  [16-18] 18  BP: (127-140)/(66-87) 127/66    Physical Exam:   General Appearance: Asleep resting comfortably vital signs normal on screen did not awaken her   Lungs:   Heart:   Skin:   Incision: Amputation site has PT wound care wound VAC in place.   Results:  Results from last 7 days   Lab Units 01/14/23  0402   WBC 10*3/mm3 9.21   HEMOGLOBIN g/dL 12.6   HEMATOCRIT % 38.0   PLATELETS 10*3/mm3 341     Results from last 7 days   Lab Units 01/14/23  0402   SODIUM mmol/L 137   POTASSIUM mmol/L 3.6   CHLORIDE mmol/L 102   CO2 mmol/L 22.0   BUN mg/dL 15   CREATININE mg/dL 0.75   GLUCOSE mg/dL 206*   CALCIUM mg/dL 9.4         Assessment: #1.  Postop day 8 right second toe transmetatarsal amputation extensive soft tissue debridement wound VAC in place  2.  Diabetes mellitus and diabetic neuropathy  3.  3 chronic plantar ulcer the metatarsal head of approxi-1 month duration which eventually require the amputation/debridement      Plan: Overall medical manage per hospitalist.  Antibiotics infectious disease.  PT wound care managing wound VAC      Javon Raygoza MD - 01/14/23 - 06:16 EST

## 2023-01-14 NOTE — DISCHARGE SUMMARY
[facility] Medicine Services  DISCHARGE SUMMARY    Patient Name: Maura Leon  : 1960  MRN: [id number]    Date of Admission: 2023  8:00 PM  Date of Discharge:  23    Primary Care Physician: Emanuel Fuentes DPM    Consults     Date and Time Order Name Status Description    2023 10:15 AM Inpatient Infectious Diseases Consult Completed     2023 12:34 AM Inpatient Cardiothoracic Surgery Consult Completed           Hospital Course     Presenting Problem:   Sepsis (HCC) [A41.9]    Active Hospital Problems    Diagnosis  POA   • **Sepsis (HCC) [A41.9]  Yes   • Ulcer of right foot (HCC) [L97.519]  Yes   • Type 2 diabetes mellitus (HCC) [E11.9]  Yes   • Psoriatic arthritis (HCC) [L40.50]  Yes   • Immunocompromised (HCC) [D84.9]  Yes   • GERD (gastroesophageal reflux disease) [K21.9]  Yes   • Cellulitis of right foot [L03.115]  Unknown      Resolved Hospital Problems   No resolved problems to display.          Hospital Course:  Maura Leon is a 62 y.o. female  with history of type 2 diabetes, psoriatic arthritis on immunosuppressive medications who presents with a right foot ulcer.  Patient was noted to have a leukocytosis of 20,000, and elevated procalcitonin and elevated CRP.  X-ray of right foot showed soft tissue swelling throughout the forefoot and midfoot jesting cellulitis, suggestion of gas production the soft tissues of the first digit and extending towards the second digit. MRI of the right foot no abscess or osteomyelitis. She underwent R 2nd toe transmetatarsal amputation with Dr. Raygoza on 2023. Infectious disease was consulted and recommended to continue IV Rocephin x6 weeks. PT/OT recommended inpatient rehab. CM has arranged for rehab at [facility]. She will be discharged today in stable condition.     Sepsis secondary to right foot ulcer with cellulitis in the setting of type 2 diabetes, Immunocompromised state  -S/p R 2nd toe transmetatarsal  amputation with Dr. Raygoza on 1/6/2023  -Continue Rocephin, plan to continue x6 weeks  -MRSA PCR +  -Wound culture with alpha strep   --Wound VAC in place.  --Holding methotrexate  --PICC order placed  --Transfer to [facility]     T2DM w/ A1c 8.2  --Resume home regimen    Acute anemia-stable  --D/t postop blood loss; admitting Hgb 13.1  --Hgb 11.4 at last check      Psoriatic arthritis   -on methotrexate: hold for now      GERD  -PPI      Hyperlipidemia  -continue statin       Discharge Follow Up Recommendations for outpatient labs/diagnostics:  Follow up with PCP 1 week after discharge from rehab.   Follow up with Dr. Raygoza in 1-2 weeks.  ID Dr. Abdul to follow at [facility].     Day of Discharge     HPI:   Patient seen resting in bed no apparent distress.  No acute events overnight per nursing.  She is feeling well and is excited to go to rehab at [facility] today.  We discussed the importance of taking all medications as prescribed and keeping all recommended follow-up appointments.  She expressed no additional concerns at this time.    Review of Systems  Gen- No fevers, chills  CV- No chest pain, palpitations  Resp- No cough, dyspnea  GI- No N/V/D, abd pain    Vital Signs:   Temp:  [96.5 °F (35.8 °C)-98.5 °F (36.9 °C)] 97.7 °F (36.5 °C)  Heart Rate:  [] 113  Resp:  [16-19] 19  BP: (127-142)/(66-91) 142/91      Physical Exam:  Constitutional: No acute distress, awake, alert  HENT: NCAT, mucous membranes moist  Respiratory: Clear to auscultation bilaterally, respiratory effort normal   Cardiovascular: RRR, no murmurs, cap refill brisk   Gastrointestinal: Positive bowel sounds, soft, nontender, nondistended  Musculoskeletal: No LE edema, RLE dressing in place  Psychiatric: Appropriate affect, cooperative  Neurologic: Oriented x 3, moves all extremities, speech clear  Skin: warm, dry, no visible rash     Pertinent  and/or Most Recent Results     LAB RESULTS:      Lab 01/14/23  0402 01/09/23  0256   WBC 9.21 8.53    HEMOGLOBIN 12.6 11.4*   HEMATOCRIT 38.0 34.7   PLATELETS 341 313   MCV 94.8 96.7         Lab 01/14/23  0402 01/09/23  0256   SODIUM 137 137   POTASSIUM 3.6 3.9   CHLORIDE 102 99   CO2 22.0 28.0   ANION GAP 13.0 10.0   BUN 15 15   CREATININE 0.75 1.00   EGFR 90.7 64.2   GLUCOSE 206* 352*   CALCIUM 9.4 9.2         Lab 01/09/23  0256   TOTAL PROTEIN 6.1   ALBUMIN 3.2*   GLOBULIN 2.9   ALT (SGPT) 43*   AST (SGOT) 22   BILIRUBIN 0.6   ALK PHOS 98                     Brief Urine Lab Results     None        Microbiology Results (last 10 days)     Procedure Component Value - Date/Time    Anaerobic Culture - Tissue, Toe, Right [[id number]]  (Normal) Collected: 01/06/23 1832    Lab Status: Final result Specimen: Tissue from Toe, Right Updated: 01/11/23 0754     Anaerobic Culture No anaerobes isolated at 5 days    Tissue / Bone Culture - Tissue, Toe, Right [[id number]]  (Abnormal) Collected: 01/06/23 1832    Lab Status: Final result Specimen: Tissue from Toe, Right Updated: 01/09/23 0914     Tissue Culture Rare Streptococcus mitis / oralis     Gram Stain No WBCs or organisms seen    Narrative:      Refer to culture collected 1/6/2023 1733 for MICs    Anaerobic Culture - Wound, Toe, Right [[id number]]  (Normal) Collected: 01/06/23 1733    Lab Status: Final result Specimen: Wound from Toe, Right Updated: 01/11/23 0754     Anaerobic Culture No anaerobes isolated at 5 days    Wound Culture - Wound, Toe, Right [[id number]]  (Abnormal)  (Susceptibility) Collected: 01/06/23 1733    Lab Status: Final result Specimen: Wound from Toe, Right Updated: 01/09/23 0827     Wound Culture Scant growth (1+) Streptococcus mitis / oralis     Gram Stain No WBCs or organisms seen    Susceptibility      Streptococcus mitis / oralis      VASYL      Penicillin G Susceptible      Vancomycin Susceptible                           MRSA Screen, PCR (Inpatient) - Swab, Nares [[id number]]  (Abnormal) Collected: 01/06/23 0939    Lab Status: Final result Specimen:  Swab from Nares Updated: 01/06/23 1407     MRSA PCR Positive    Narrative:      The negative predictive value of this diagnostic test is high and should only be used to consider de-escalating anti-MRSA therapy. A positive result may indicate colonization with MRSA and must be correlated clinically.    Wound Culture - Swab, Foot, Right [[id number]] Collected: 01/05/23 2201    Lab Status: Final result Specimen: Swab from Foot, Right Updated: 01/08/23 0730     Wound Culture Moderate growth (3+) Normal Skin Kayli     Gram Stain No WBCs or organisms seen    Blood Culture - Blood, Arm, Left [[id number]]  (Normal) Collected: 01/05/23 2105    Lab Status: Final result Specimen: Blood from Arm, Left Updated: 01/10/23 2131     Blood Culture No growth at 5 days    Blood Culture - Blood, Arm, Left [[id number]]  (Normal) Collected: 01/05/23 1758    Lab Status: Final result Specimen: Blood from Arm, Left Updated: 01/10/23 1815     Blood Culture No growth at 5 days          XR Foot 3+ View Right    Result Date: 1/5/2023  XR FOOT 3+ VW RIGHT Date of Exam: 1/5/2023 8:06 PM EST Indication: DM with foot ulcer; rule out gas in tissue. Comparison: None available. Findings: Postoperative changes are noted status post prior resection of the great toe at the level of the distal diaphysis of the proximal phalanx. There is soft tissue swelling within the residual soft tissues of the great toe extending into the soft tissues of the second through fifth digits and surrounding the forefoot. There is also evidence for edema extending towards the midfoot. The findings suggest changes of soft tissue cellulitis. There is suggestion of possible gas production in the soft tissues at the interdigital space between the first and second metatarsophalangeal joints and extending into the base of the residual stump of the first digit as well as the proximal aspect of the second digit. No definitive cortical erosion or destruction is seen.  There is no  definitive evidence for osteomyelitis. Changes of arthropathy are seen throughout the midfoot suggesting developing neuropathic arthropathy or Charcot foot.     Impression: 1.Soft tissue swelling throughout the forefoot and midfoot. The findings suggest changes of soft tissue cellulitis. 2.There is suggestion of gas production in the soft tissues of the residual first digit and extending towards the base of the second digit. 3.No definitive evidence for osteomyelitis. Electronically Signed: Laith Morgan  1/5/2023 8:37 PM EST  Workstation ID: [id number]    MRI Foot Right With & Without Contrast    Result Date: 1/6/2023  MRI OF THE RIGHT FOOT WITH AND WITHOUT INTRAVENOUS CONTRAST HISTORY: Right forefoot pain. TECHNIQUE: Multiplanar and multisequence MR imaging of the right forefoot was performed without the administration of intravenous gadolinium. Imaging sequences include axial and sagittal T1, axial and sagittal proton density, coronal and sagittal fat-suppressed proton density, and axial fat-suppressed T2-weighted images. 20 mL of MultiHance was administered. COMPARISON: None. FINDINGS: There is a large amount of diffuse soft tissue swelling of the forefoot that could be a mixture of cellulitis and dependent edema. There is a wound/ulceration over the plantar aspect of the second metatarsal phalangeal joint with some necrosis and poor enhancement of the plantar soft tissues at this site. There is no abscess there is no soft tissue gas. No evidence of osteomyelitis. Partial amputation of the great toe.     1. No abscess. No osteomyelitis. 2. Plantar wound/ulceration over the second metatarsal phalangeal joint with some adjacent soft tissue necrosis an inflammatory phlegmon but no well-defined fluid collection. 3. Diffuse soft tissue swelling that could be a mixture of cellulitis and dependent edema. Electronically signed by:  Burke Evans M.D.  1/6/2023 2:11 AM Mountain Time                  Plan for  Follow-up of Pending Labs/Results: Follow-up as directed    Discharge Details        Discharge Medications      New Medications      Instructions Start Date   cefTRIAXone  Commonly known as: ROCEPHIN   2 g, Intravenous, Every 24 Hours   Start Date: January 15, 2023     saccharomyces boulardii 250 MG capsule  Commonly known as: FLORASTOR   250 mg, Oral, 2 Times Daily         Continue These Medications      Instructions Start Date   artificial tears ophthalmic ointment   Both Eyes, Nightly      aspirin 81 MG chewable tablet   81 mg, Oral, Daily      atorvastatin 10 MG tablet  Commonly known as: LIPITOR   10 mg, Oral, Daily      betamethasone (augmented) 0.05 % cream  Commonly known as: DIPROLENE   1 application, Topical, 2 Times Daily PRN      Biotin 09745 MCG tablet   Oral      calcium carbonate 600 MG tablet  Commonly known as: OS-SUSAN   600 mg, Oral, Daily      carboxymethylcellulose 0.5 % solution  Commonly known as: REFRESH PLUS   3 Times Daily PRN      Claritin 10 MG capsule  Generic drug: Loratadine   Oral      dexlansoprazole 60 MG capsule  Commonly known as: DEXILANT   60 mg, Oral, Daily      fluocinolone 0.01 % cream   1 application, Topical, 2 Times Daily      folic acid 0.5 MG half tablet  Commonly known as: FOLVITE   0.5 mg, Oral, Daily      hydroCHLOROthiazide 25 MG tablet  Commonly known as: HYDRODIURIL   50 mg, Oral, Daily      metFORMIN 500 MG tablet  Commonly known as: GLUCOPHAGE   500 mg, Oral, 2 Times Daily With Meals      Minoxidil 5 % solution   Apply externally      MOMETASONE FUROATE EX   Apply externally      nystatin 870922 UNIT/GM powder  Generic drug: nystatin   Topical, 4 Times Daily      potassium citrate 10 MEQ (1080 MG) CR tablet  Commonly known as: UROCIT-K   10 mEq, Oral, 3 Times Daily With Meals      selenium sulfide 2.5 % lotion  Commonly known as: SELSUN   Topical, Daily PRN      Semaglutide(0.25 or 0.5MG/DOS) 2 MG/1.5ML solution pen-injector  Commonly known as: OZEMPIC    Subcutaneous, Weekly      TRESIBA SC   Subcutaneous, 2 Times Daily, 60 units AM and 80 units PM      Turmeric 500 MG capsule   1,000 mg, Oral      vitamin C 250 MG tablet  Commonly known as: ASCORBIC ACID   500 mg, Oral, Daily      vitamin D 1.25 MG (40084 UT) capsule capsule  Commonly known as: ERGOCALCIFEROL   50,000 Units, Oral, Weekly      vitamin D3 125 MCG (5000 UT) capsule capsule   5,000 Units, Oral, Daily      Zinc 25 MG tablet   Oral             Allergies   Allergen Reactions   • Ciprofloxacin Provider Review Needed   • Clindamycin/Lincomycin Hives   • Doxycycline Provider Review Needed   • Nsaids Provider Review Needed   • Sulfa Antibiotics Provider Review Needed   • Tetracyclines & Related Provider Review Needed         Discharge Disposition:  Rehab Facility or Unit (DC - External)    Diet:  Hospital:  Diet Order   Procedures   • Diet: Regular/House Diet, Diabetic Diets; Consistent Carbohydrate; Texture: Regular Texture (IDDSI 7); Fluid Consistency: Thin (IDDSI 0)       Activity:  NWB LLE          CODE STATUS:    Code Status and Medical Interventions:   Ordered at: 01/05/23 0274     Level Of Support Discussed With:    Patient     Code Status (Patient has no pulse and is not breathing):    CPR (Attempt to Resuscitate)     Medical Interventions (Patient has pulse or is breathing):    Full Support       No future appointments.    Additional Instructions for the Follow-ups that You Need to Schedule     Discharge Follow-up with PCP   As directed       Currently Documented PCP:    Emanuel Fuentes DPM    PCP Phone Number:    [phone]     Follow Up Details: Follow up with PCP 1 week after discharge from rehab.         Discharge Follow-up with Specified Provider: Follow up with Dr. Raygoza in 1-2 weeks.   As directed      To: Follow up with Dr. Raygoza in 1-2 weeks.                     JOEY Butler  01/14/23      Time Spent on Discharge:  I spent  37  minutes on this discharge activity which included:  face-to-face encounter with the patient, reviewing the data in the system, coordination of the care with the nursing staff as well as consultants, documentation, and entering orders.

## 2023-01-14 NOTE — PLAN OF CARE
Goal Outcome Evaluation:  VSS. Pt preparing to discharge at 1245 by [facility]. Report called to [facility] at 1050 to Nurse Brown. No new concerns. Will CTM.     Problem: Adult Inpatient Plan of Care  Goal: Plan of Care Review  Outcome: Met  Goal: Patient-Specific Goal (Individualized)  Outcome: Met  Goal: Absence of Hospital-Acquired Illness or Injury  Outcome: Met  Intervention: Identify and Manage Fall Risk  Description: Perform standard risk assessment on admission using a validated tool or comprehensive approach appropriate to the patient; reassess fall risk frequently, with change in status or transfer to another level of care.  Communicate fall injury risk to interprofessional healthcare team.  Determine need for increased observation, equipment and environmental modification, such as low bed, signage and supportive, nonskid footwear.  Adjust safety measures to individual developmental age, stage and identified risk factors.  Reinforce the importance of safety and physical activity with patient and family.  Perform regular intentional rounding to assess need for position change, pain assessment and personal needs, including assistance with toileting.  Recent Flowsheet Documentation  Taken 1/14/2023 0819 by Kadie Mariano RN  Safety Promotion/Fall Prevention:   activity supervised   assistive device/personal items within reach   clutter free environment maintained   fall prevention program maintained   nonskid shoes/slippers when out of bed   room organization consistent   safety round/check completed  Intervention: Prevent Skin Injury  Description: Perform a screening for skin injury risk, such as pressure or moisture associated skin damage on admission and at regular intervals throughout hospital stay.  Keep all areas of skin (especially folds) clean and dry.  Maintain adequate skin hydration.  Relieve and redistribute pressure and protect bony prominences; implement measures based on patient-specific risk  factors.  Match turning and repositioning schedule to clinical condition.  Encourage weight shift frequently; assist with reposition if unable to complete independently.  Float heels off bed; avoid pressure on the Achilles tendon.  Keep skin free from extended contact with medical devices.  Encourage functional activity and mobility, as early as tolerated.  Use aids (e.g., slide boards, mechanical lift) during transfer.  Recent Flowsheet Documentation  Taken 1/14/2023 0819 by Kadie Mariano RN  Body Position: position changed independently  Intervention: Prevent and Manage VTE (Venous Thromboembolism) Risk  Description: Assess for VTE (venous thromboembolism) risk.  Encourage and assist with early ambulation.  Initiate and maintain compression or other therapy, as indicated, based on identified risk in accordance with organizational protocol and provider order.  Encourage both active and passive leg exercises while in bed, if unable to ambulate.  Recent Flowsheet Documentation  Taken 1/14/2023 0819 by Kadie Mariano RN  Activity Management: activity adjusted per tolerance  Intervention: Prevent Infection  Description: Maintain skin and mucous membrane integrity; promote hand, oral and pulmonary hygiene.  Optimize fluid balance, nutrition, sleep and glycemic control to maximize infection resistance.  Identify potential sources of infection early to prevent or mitigate progression of infection (e.g., wound, lines, devices).  Evaluate ongoing need for invasive devices; remove promptly when no longer indicated.  Recent Flowsheet Documentation  Taken 1/14/2023 0819 by Kadie Mariano RN  Infection Prevention: environmental surveillance performed  Goal: Optimal Comfort and Wellbeing  Outcome: Met  Goal: Readiness for Transition of Care  Outcome: Met     Problem: Skin Injury Risk Increased  Goal: Skin Health and Integrity  Outcome: Met  Intervention: Optimize Skin Protection  Description: Perform a full  pressure injury risk assessment, as indicated by screening, upon admission to care unit.  Reassess skin (injury risk, full inspection) frequently (e.g., scheduled interval, with change in condition) to provide optimal early detection and prevention.  Maintain adequate tissue perfusion (e.g., encourage fluid balance; avoid crossing legs, constrictive clothing or devices) to promote tissue oxygenation.  Maintain head of bed at lowest degree of elevation tolerated, considering medical condition and other restrictions.  Avoid positioning onto an area that remains reddened.  Minimize incontinence and moisture (e.g., toileting schedule; moisture-wicking pad, diaper or incontinence collection device; skin moisture barrier).  Cleanse skin promptly and gently when soiled utilizing a pH-balanced cleanser.  Relieve and redistribute pressure (e.g., scheduled position changes, weight shifts, use of support surface, medical device repositioning, protective dressing application, use of positioning device, microclimate control, use of pressure-injury-monitor  Encourage increased activity, such as sitting in a chair at the bedside or early mobilization, when able to tolerate.  Recent Flowsheet Documentation  Taken 1/14/2023 0819 by Kadie Mariano, RN  Pressure Reduction Techniques:   frequent weight shift encouraged   heels elevated off bed   positioned off wounds   pressure points protected   weight shift assistance provided  Head of Bed (HOB) Positioning: HOB at 45 degrees  Pressure Reduction Devices:   positioning supports utilized   heel offloading device utilized   pressure-redistributing mattress utilized

## 2023-01-14 NOTE — DISCHARGE PLACEMENT REQUEST
"Case management  [phone]    Casa Nguyễn (62 y.o. Female)     Date of Birth   1960    Social Security Number       Address   [address] [place] [address]    Home Phone   [phone]    MRN   [id number]       Hoahaoism   Restoration    Marital Status                               Admission Date   23    Admission Type   Emergency    Admitting Provider   Winter Jeong II, DO    Attending Provider   Winter Jeong II, DO    Department, Room/Bed   [facility] 5G, S551/1       Discharge Date       Discharge Disposition   Rehab Facility or Unit (DC - External)    Discharge Destination                               Attending Provider: Winter Jeong II, DO    Allergies: Ciprofloxacin, Clindamycin/lincomycin, Doxycycline, Nsaids, Sulfa Antibiotics, Tetracyclines & Related    Isolation: None   Infection: MRSA (23)   Code Status: CPR    Ht: 167.6 cm (66\")   Wt: 116 kg (255 lb)    Admission Cmt: None   Principal Problem: Sepsis (HCC) [A41.9]                 Active Insurance as of 2023     Primary Coverage     Payor Plan Insurance Group Employer/Plan Group    [employer] BLUE CROSS AN[employer]M BLUE CROSS BLUE SHIELD PPO [id number]     Payor Plan Address Payor Plan Phone Number Payor Plan Fax Number Effective Dates    [address] [phone]  2023 - None Entered    [place] [address]       Subscriber Name Subscriber Birth Date Member ID       CASA NGUYỄN 1960 [id number]                 Emergency Contacts      (Rel.) Home Phone Work Phone Mobile Phone    Ivonne Gong (Daughter) -- -- [phone]    EdwardDereje (Spouse) -- -- [phone]               Discharge Summary      Tab Elder APRN at 23 1041              [facility] Medicine Services  DISCHARGE SUMMARY    Patient Name: Casa Nguyễn  : 1960  MRN: [id number]    Date of Admission: 2023  8:00 PM  Date of Discharge:  " 01/14/23    Primary Care Physician: Emanuel Fuentes DPM    Consults     Date and Time Order Name Status Description    1/6/2023 10:15 AM Inpatient Infectious Diseases Consult Completed     1/6/2023 12:34 AM Inpatient Cardiothoracic Surgery Consult Completed           Hospital Course     Presenting Problem:   Sepsis (HCC) [A41.9]    Active Hospital Problems    Diagnosis  POA   • **Sepsis (HCC) [A41.9]  Yes   • Ulcer of right foot (HCC) [L97.519]  Yes   • Type 2 diabetes mellitus (HCC) [E11.9]  Yes   • Psoriatic arthritis (HCC) [L40.50]  Yes   • Immunocompromised (HCC) [D84.9]  Yes   • GERD (gastroesophageal reflux disease) [K21.9]  Yes   • Cellulitis of right foot [L03.115]  Unknown      Resolved Hospital Problems   No resolved problems to display.          Hospital Course:  Maura Leon is a 62 y.o. female  with history of type 2 diabetes, psoriatic arthritis on immunosuppressive medications who presents with a right foot ulcer.  Patient was noted to have a leukocytosis of 20,000, and elevated procalcitonin and elevated CRP.  X-ray of right foot showed soft tissue swelling throughout the forefoot and midfoot jesting cellulitis, suggestion of gas production the soft tissues of the first digit and extending towards the second digit. MRI of the right foot no abscess or osteomyelitis. She underwent R 2nd toe transmetatarsal amputation with Dr. Raygoza on 1/6/2023. Infectious disease was consulted and recommended to continue IV Rocephin x6 weeks. PT/OT recommended inpatient rehab.  has arranged for rehab at [facility]. She will be discharged today in stable condition.     Sepsis secondary to right foot ulcer with cellulitis in the setting of type 2 diabetes, Immunocompromised state  -S/p R 2nd toe transmetatarsal amputation with Dr. Raygoza on 1/6/2023  -Continue Rocephin, plan to continue x6 weeks  -MRSA PCR +  -Wound culture with alpha strep   --Wound VAC in place.  --Holding methotrexate  --PICC order  placed  --Transfer to [facility]     T2DM w/ A1c 8.2  --Resume home regimen    Acute anemia-stable  --D/t postop blood loss; admitting Hgb 13.1  --Hgb 11.4 at last check      Psoriatic arthritis   -on methotrexate: hold for now      GERD  -PPI      Hyperlipidemia  -continue statin       Discharge Follow Up Recommendations for outpatient labs/diagnostics:  Follow up with PCP 1 week after discharge from rehab.   Follow up with Dr. Raygoza in 1-2 weeks.  ID Dr. Abdul to follow at [facility].     Day of Discharge     HPI:   Patient seen resting in bed no apparent distress.  No acute events overnight per nursing.  She is feeling well and is excited to go to rehab at [facility] today.  We discussed the importance of taking all medications as prescribed and keeping all recommended follow-up appointments.  She expressed no additional concerns at this time.    Review of Systems  Gen- No fevers, chills  CV- No chest pain, palpitations  Resp- No cough, dyspnea  GI- No N/V/D, abd pain    Vital Signs:   Temp:  [96.5 °F (35.8 °C)-98.5 °F (36.9 °C)] 97.7 °F (36.5 °C)  Heart Rate:  [] 113  Resp:  [16-19] 19  BP: (127-142)/(66-91) 142/91      Physical Exam:  Constitutional: No acute distress, awake, alert  HENT: NCAT, mucous membranes moist  Respiratory: Clear to auscultation bilaterally, respiratory effort normal   Cardiovascular: RRR, no murmurs, cap refill brisk   Gastrointestinal: Positive bowel sounds, soft, nontender, nondistended  Musculoskeletal: No LE edema, RLE dressing in place  Psychiatric: Appropriate affect, cooperative  Neurologic: Oriented x 3, moves all extremities, speech clear  Skin: warm, dry, no visible rash     Pertinent  and/or Most Recent Results     LAB RESULTS:      Lab 01/14/23  0402 01/09/23  0256   WBC 9.21 8.53   HEMOGLOBIN 12.6 11.4*   HEMATOCRIT 38.0 34.7   PLATELETS 341 313   MCV 94.8 96.7         Lab 01/14/23  0402 01/09/23  0256   SODIUM 137 137   POTASSIUM 3.6 3.9   CHLORIDE 102 99   CO2 22.0 28.0    ANION GAP 13.0 10.0   BUN 15 15   CREATININE 0.75 1.00   EGFR 90.7 64.2   GLUCOSE 206* 352*   CALCIUM 9.4 9.2         Lab 01/09/23  0256   TOTAL PROTEIN 6.1   ALBUMIN 3.2*   GLOBULIN 2.9   ALT (SGPT) 43*   AST (SGOT) 22   BILIRUBIN 0.6   ALK PHOS 98                     Brief Urine Lab Results     None        Microbiology Results (last 10 days)     Procedure Component Value - Date/Time    Anaerobic Culture - Tissue, Toe, Right [[id number]]  (Normal) Collected: 01/06/23 1832    Lab Status: Final result Specimen: Tissue from Toe, Right Updated: 01/11/23 0754     Anaerobic Culture No anaerobes isolated at 5 days    Tissue / Bone Culture - Tissue, Toe, Right [[id number]]  (Abnormal) Collected: 01/06/23 1832    Lab Status: Final result Specimen: Tissue from Toe, Right Updated: 01/09/23 0914     Tissue Culture Rare Streptococcus mitis / oralis     Gram Stain No WBCs or organisms seen    Narrative:      Refer to culture collected 1/6/2023 1733 for MICs    Anaerobic Culture - Wound, Toe, Right [[id number]]  (Normal) Collected: 01/06/23 1733    Lab Status: Final result Specimen: Wound from Toe, Right Updated: 01/11/23 0754     Anaerobic Culture No anaerobes isolated at 5 days    Wound Culture - Wound, Toe, Right [[id number]]  (Abnormal)  (Susceptibility) Collected: 01/06/23 1733    Lab Status: Final result Specimen: Wound from Toe, Right Updated: 01/09/23 0827     Wound Culture Scant growth (1+) Streptococcus mitis / oralis     Gram Stain No WBCs or organisms seen    Susceptibility      Streptococcus mitis / oralis      VASYL      Penicillin G Susceptible      Vancomycin Susceptible                           MRSA Screen, PCR (Inpatient) - Swab, Nares [[id number]]  (Abnormal) Collected: 01/06/23 0939    Lab Status: Final result Specimen: Swab from Nares Updated: 01/06/23 1407     MRSA PCR Positive    Narrative:      The negative predictive value of this diagnostic test is high and should only be used to consider de-escalating  anti-MRSA therapy. A positive result may indicate colonization with MRSA and must be correlated clinically.    Wound Culture - Swab, Foot, Right [[id number]] Collected: 01/05/23 2201    Lab Status: Final result Specimen: Swab from Foot, Right Updated: 01/08/23 0730     Wound Culture Moderate growth (3+) Normal Skin Kayli     Gram Stain No WBCs or organisms seen    Blood Culture - Blood, Arm, Left [[id number]]  (Normal) Collected: 01/05/23 2105    Lab Status: Final result Specimen: Blood from Arm, Left Updated: 01/10/23 2131     Blood Culture No growth at 5 days    Blood Culture - Blood, Arm, Left [[id number]]  (Normal) Collected: 01/05/23 1758    Lab Status: Final result Specimen: Blood from Arm, Left Updated: 01/10/23 1815     Blood Culture No growth at 5 days          XR Foot 3+ View Right    Result Date: 1/5/2023  XR FOOT 3+ VW RIGHT Date of Exam: 1/5/2023 8:06 PM EST Indication: DM with foot ulcer; rule out gas in tissue. Comparison: None available. Findings: Postoperative changes are noted status post prior resection of the great toe at the level of the distal diaphysis of the proximal phalanx. There is soft tissue swelling within the residual soft tissues of the great toe extending into the soft tissues of the second through fifth digits and surrounding the forefoot. There is also evidence for edema extending towards the midfoot. The findings suggest changes of soft tissue cellulitis. There is suggestion of possible gas production in the soft tissues at the interdigital space between the first and second metatarsophalangeal joints and extending into the base of the residual stump of the first digit as well as the proximal aspect of the second digit. No definitive cortical erosion or destruction is seen.  There is no definitive evidence for osteomyelitis. Changes of arthropathy are seen throughout the midfoot suggesting developing neuropathic arthropathy or Charcot foot.     Impression: 1.Soft tissue swelling  throughout the forefoot and midfoot. The findings suggest changes of soft tissue cellulitis. 2.There is suggestion of gas production in the soft tissues of the residual first digit and extending towards the base of the second digit. 3.No definitive evidence for osteomyelitis. Electronically Signed: Laith Morgan  1/5/2023 8:37 PM EST  Workstation ID: [id number]    MRI Foot Right With & Without Contrast    Result Date: 1/6/2023  MRI OF THE RIGHT FOOT WITH AND WITHOUT INTRAVENOUS CONTRAST HISTORY: Right forefoot pain. TECHNIQUE: Multiplanar and multisequence MR imaging of the right forefoot was performed without the administration of intravenous gadolinium. Imaging sequences include axial and sagittal T1, axial and sagittal proton density, coronal and sagittal fat-suppressed proton density, and axial fat-suppressed T2-weighted images. 20 mL of MultiHance was administered. COMPARISON: None. FINDINGS: There is a large amount of diffuse soft tissue swelling of the forefoot that could be a mixture of cellulitis and dependent edema. There is a wound/ulceration over the plantar aspect of the second metatarsal phalangeal joint with some necrosis and poor enhancement of the plantar soft tissues at this site. There is no abscess there is no soft tissue gas. No evidence of osteomyelitis. Partial amputation of the great toe.     1. No abscess. No osteomyelitis. 2. Plantar wound/ulceration over the second metatarsal phalangeal joint with some adjacent soft tissue necrosis an inflammatory phlegmon but no well-defined fluid collection. 3. Diffuse soft tissue swelling that could be a mixture of cellulitis and dependent edema. Electronically signed by:  Burke Evans M.D.  1/6/2023 2:11 AM Mountain Time                  Plan for Follow-up of Pending Labs/Results: Follow-up as directed    Discharge Details        Discharge Medications      New Medications      Instructions Start Date   cefTRIAXone  Commonly known as: ROCEPHIN   2  g, Intravenous, Every 24 Hours   Start Date: January 15, 2023     saccharomyces boulardii 250 MG capsule  Commonly known as: FLORASTOR   250 mg, Oral, 2 Times Daily         Continue These Medications      Instructions Start Date   artificial tears ophthalmic ointment   Both Eyes, Nightly      aspirin 81 MG chewable tablet   81 mg, Oral, Daily      atorvastatin 10 MG tablet  Commonly known as: LIPITOR   10 mg, Oral, Daily      betamethasone (augmented) 0.05 % cream  Commonly known as: DIPROLENE   1 application, Topical, 2 Times Daily PRN      Biotin 79641 MCG tablet   Oral      calcium carbonate 600 MG tablet  Commonly known as: OS-SUSAN   600 mg, Oral, Daily      carboxymethylcellulose 0.5 % solution  Commonly known as: REFRESH PLUS   3 Times Daily PRN      Claritin 10 MG capsule  Generic drug: Loratadine   Oral      dexlansoprazole 60 MG capsule  Commonly known as: DEXILANT   60 mg, Oral, Daily      fluocinolone 0.01 % cream   1 application, Topical, 2 Times Daily      folic acid 0.5 MG half tablet  Commonly known as: FOLVITE   0.5 mg, Oral, Daily      hydroCHLOROthiazide 25 MG tablet  Commonly known as: HYDRODIURIL   50 mg, Oral, Daily      metFORMIN 500 MG tablet  Commonly known as: GLUCOPHAGE   500 mg, Oral, 2 Times Daily With Meals      Minoxidil 5 % solution   Apply externally      MOMETASONE FUROATE EX   Apply externally      nystatin 989981 UNIT/GM powder  Generic drug: nystatin   Topical, 4 Times Daily      potassium citrate 10 MEQ (1080 MG) CR tablet  Commonly known as: UROCIT-K   10 mEq, Oral, 3 Times Daily With Meals      selenium sulfide 2.5 % lotion  Commonly known as: SELSUN   Topical, Daily PRN      Semaglutide(0.25 or 0.5MG/DOS) 2 MG/1.5ML solution pen-injector  Commonly known as: OZEMPIC   Subcutaneous, Weekly      TRESIBA SC   Subcutaneous, 2 Times Daily, 60 units AM and 80 units PM      Turmeric 500 MG capsule   1,000 mg, Oral      vitamin C 250 MG tablet  Commonly known as: ASCORBIC ACID   500 mg,  Oral, Daily      vitamin D 1.25 MG (16876 UT) capsule capsule  Commonly known as: ERGOCALCIFEROL   50,000 Units, Oral, Weekly      vitamin D3 125 MCG (5000 UT) capsule capsule   5,000 Units, Oral, Daily      Zinc 25 MG tablet   Oral             Allergies   Allergen Reactions   • Ciprofloxacin Provider Review Needed   • Clindamycin/Lincomycin Hives   • Doxycycline Provider Review Needed   • Nsaids Provider Review Needed   • Sulfa Antibiotics Provider Review Needed   • Tetracyclines & Related Provider Review Needed         Discharge Disposition:  Rehab Facility or Unit (DC - External)    Diet:  Hospital:  Diet Order   Procedures   • Diet: Regular/House Diet, Diabetic Diets; Consistent Carbohydrate; Texture: Regular Texture (IDDSI 7); Fluid Consistency: Thin (IDDSI 0)       Activity:  NWB LLE          CODE STATUS:    Code Status and Medical Interventions:   Ordered at: 01/05/23 2963     Level Of Support Discussed With:    Patient     Code Status (Patient has no pulse and is not breathing):    CPR (Attempt to Resuscitate)     Medical Interventions (Patient has pulse or is breathing):    Full Support       No future appointments.    Additional Instructions for the Follow-ups that You Need to Schedule     Discharge Follow-up with PCP   As directed       Currently Documented PCP:    Emanuel Fuentes DPM    PCP Phone Number:    [phone]     Follow Up Details: Follow up with PCP 1 week after discharge from rehab.         Discharge Follow-up with Specified Provider: Follow up with Dr. Raygoza in 1-2 weeks.   As directed      To: Follow up with Dr. Raygoza in 1-2 weeks.                     Tab Elder, JOEY  01/14/23      Time Spent on Discharge:  I spent  37  minutes on this discharge activity which included: face-to-face encounter with the patient, reviewing the data in the system, coordination of the care with the nursing staff as well as consultants, documentation, and entering orders.          Electronically signed by  Tab Elder, APRN at 01/14/23 1049

## 2023-01-19 NOTE — PAYOR COMM NOTE
"Fatoumata Lynn, RN  Utilization Management  P:[phone]  F:[phone]    Auth# [id number]  DC date 1/14. LCD 1/6  Casa Nguyễn (62 y.o. Female)     Date of Birth   1960    Social Security Number       Address   [address] [place] [id number]    Home Phone   [phone]    MRN   [id number]       Jew   Adventism    Marital Status                               Admission Date   1/5/23    Admission Type   Emergency    Admitting Provider   Winter Jeong II, DO    Attending Provider       Department, Room/Bed   [facility] 5G, S551/1       Discharge Date   1/14/2023    Discharge Disposition   Rehab Facility or Unit (DC - External)    Discharge Destination                               Attending Provider: (none)   Allergies: Ciprofloxacin, Clindamycin/lincomycin, Doxycycline, Nsaids, Sulfa Antibiotics, Tetracyclines & Related    Isolation: None   Infection: MRSA (01/06/23)   Code Status: Prior    Ht: 167.6 cm (66\")   Wt: 116 kg (255 lb)    Admission Cmt: None   Principal Problem: Sepsis (HCC) [A41.9]                 Active Insurance as of 1/5/2023     Primary Coverage     Payor Plan Insurance Group Employer/Plan Group    [employer] PPO [id number]     Payor Plan Address Payor Plan Phone Number Payor Plan Fax Number Effective Dates    [address] [phone]  1/1/2023 - None Entered    [place] [address]       Subscriber Name Subscriber Birth Date Member ID       CASA NGUYỄN 1960 [id number]                 Emergency Contacts      (Rel.) Home Phone Work Phone Mobile Phone    Ivonne Gong (Daughter) -- -- [phone]    Dereje Nguyễn (Spouse) -- -- [phone]              Lab Results (last 24 hours)     Procedure Component Value Units Date/Time    POC Glucose Once [[id number]]  (Abnormal) Collected: 01/14/23 1155    Specimen: Blood Updated: 01/14/23 1157     Glucose 292 mg/dL      Comment: Meter: [id number] " : [id number] Tot[place] Rhina       POC Glucose Once [[id number]]  (Abnormal) Collected: 01/14/23 0736    Specimen: Blood Updated: 01/14/23 0742     Glucose 213 mg/dL      Comment: Meter: [id number] : [id number] Marito Lantigua       Basic Metabolic Panel [[id number]]  (Abnormal) Collected: 01/14/23 0402    Specimen: Blood Updated: 01/14/23 0550     Glucose 206 mg/dL      BUN 15 mg/dL      Creatinine 0.75 mg/dL      Sodium 137 mmol/L      Potassium 3.6 mmol/L      Comment: Slight hemolysis detected by analyzer. Results may be affected.        Chloride 102 mmol/L      CO2 22.0 mmol/L      Calcium 9.4 mg/dL      BUN/Creatinine Ratio 20.0     Anion Gap 13.0 mmol/L      eGFR 90.7 mL/min/1.73      Comment: National Kidney Foundation and American Society of Nephrology (ASN) Task Force recommended calculation based on the Chronic Kidney Disease Epidemiology Collaboration (CKD-EPI) equation refit without adjustment for race.       Narrative:      GFR Normal >60  Chronic Kidney Disease <60  Kidney Failure <15      CBC (No Diff) [[id number]]  (Normal) Collected: 01/14/23 0402    Specimen: Blood Updated: 01/14/23 0525     WBC 9.21 10*3/mm3      RBC 4.01 10*6/mm3      Hemoglobin 12.6 g/dL      Hematocrit 38.0 %      MCV 94.8 fL      MCH 31.4 pg      MCHC 33.2 g/dL      RDW 13.2 %      RDW-SD 44.9 fl      MPV 9.3 fL      Platelets 341 10*3/mm3     POC Glucose Once [[id number]]  (Abnormal) Collected: 01/13/23 1949    Specimen: Blood Updated: 01/13/23 1950     Glucose 219 mg/dL      Comment: Meter: [id number] : [id number] Cruzito Canela       POC Glucose Once [[id number]]  (Abnormal) Collected: 01/13/23 1709    Specimen: Blood Updated: 01/13/23 1711     Glucose 244 mg/dL      Comment: Meter: [id number] : [id number] Munir Geovanna                  Discharge Summary      Tab Elder APRN at 01/14/23 1041     Attestation signed by Winter Jeong II, DO at 01/14/23 122    I have reviewed this documentation and  agree.                      [facility]  DISCHARGE SUMMARY    Patient Name: Maura Leon  : 1960  MRN: [id number]    Date of Admission: 2023  8:00 PM  Date of Discharge:  23    Primary Care Physician: Emanuel Fuentes DPM    Consults     Date and Time Order Name Status Description    2023 10:15 AM Inpatient Infectious Diseases Consult Completed     2023 12:34 AM Inpatient Cardiothoracic Surgery Consult Completed           Hospital Course     Presenting Problem:   Sepsis (HCC) [A41.9]    Active Hospital Problems    Diagnosis  POA   • **Sepsis (HCC) [A41.9]  Yes   • Ulcer of right foot (HCC) [L97.519]  Yes   • Type 2 diabetes mellitus (HCC) [E11.9]  Yes   • Psoriatic arthritis (HCC) [L40.50]  Yes   • Immunocompromised (HCC) [D84.9]  Yes   • GERD (gastroesophageal reflux disease) [K21.9]  Yes   • Cellulitis of right foot [L03.115]  Unknown      Resolved Hospital Problems   No resolved problems to display.          Hospital Course:  Maura Leon is a 62 y.o. female  with history of type 2 diabetes, psoriatic arthritis on immunosuppressive medications who presents with a right foot ulcer.  Patient was noted to have a leukocytosis of 20,000, and elevated procalcitonin and elevated CRP.  X-ray of right foot showed soft tissue swelling throughout the forefoot and midfoot jesting cellulitis, suggestion of gas production the soft tissues of the first digit and extending towards the second digit. MRI of the right foot no abscess or osteomyelitis. She underwent R 2nd toe transmetatarsal amputation with Dr. Raygoza on 2023. Infectious disease was consulted and recommended to continue IV Rocephin x6 weeks. PT/OT recommended inpatient rehab. CM has arranged for rehab at [facility]. She will be discharged today in stable condition.     Sepsis secondary to right foot ulcer with cellulitis in the setting of type 2 diabetes, Immunocompromised state  -S/p R 2nd toe  transmetatarsal amputation with Dr. Raygoza on 1/6/2023  -Continue Rocephin, plan to continue x6 weeks  -MRSA PCR +  -Wound culture with alpha strep   --Wound VAC in place.  --Holding methotrexate  --PICC order placed  --Transfer to [facility]     T2DM w/ A1c 8.2  --Resume home regimen    Acute anemia-stable  --D/t postop blood loss; admitting Hgb 13.1  --Hgb 11.4 at last check      Psoriatic arthritis   -on methotrexate: hold for now      GERD  -PPI      Hyperlipidemia  -continue statin       Discharge Follow Up Recommendations for outpatient labs/diagnostics:  Follow up with PCP 1 week after discharge from rehab.   Follow up with Dr. Raygoza in 1-2 weeks.  ID Dr. Abdul to follow at [facility].     Day of Discharge     HPI:   Patient seen resting in bed no apparent distress.  No acute events overnight per nursing.  She is feeling well and is excited to go to rehab at [facility] today.  We discussed the importance of taking all medications as prescribed and keeping all recommended follow-up appointments.  She expressed no additional concerns at this time.    Review of Systems  Gen- No fevers, chills  CV- No chest pain, palpitations  Resp- No cough, dyspnea  GI- No N/V/D, abd pain    Vital Signs:   Temp:  [96.5 °F (35.8 °C)-98.5 °F (36.9 °C)] 97.7 °F (36.5 °C)  Heart Rate:  [] 113  Resp:  [16-19] 19  BP: (127-142)/(66-91) 142/91      Physical Exam:  Constitutional: No acute distress, awake, alert  HENT: NCAT, mucous membranes moist  Respiratory: Clear to auscultation bilaterally, respiratory effort normal   Cardiovascular: RRR, no murmurs, cap refill brisk   Gastrointestinal: Positive bowel sounds, soft, nontender, nondistended  Musculoskeletal: No LE edema, RLE dressing in place  Psychiatric: Appropriate affect, cooperative  Neurologic: Oriented x 3, moves all extremities, speech clear  Skin: warm, dry, no visible rash     Pertinent  and/or Most Recent Results     LAB RESULTS:      Lab 01/14/23  0402 01/09/23  0256   WBC  9.21 8.53   HEMOGLOBIN 12.6 11.4*   HEMATOCRIT 38.0 34.7   PLATELETS 341 313   MCV 94.8 96.7         Lab 01/14/23  0402 01/09/23  0256   SODIUM 137 137   POTASSIUM 3.6 3.9   CHLORIDE 102 99   CO2 22.0 28.0   ANION GAP 13.0 10.0   BUN 15 15   CREATININE 0.75 1.00   EGFR 90.7 64.2   GLUCOSE 206* 352*   CALCIUM 9.4 9.2         Lab 01/09/23  0256   TOTAL PROTEIN 6.1   ALBUMIN 3.2*   GLOBULIN 2.9   ALT (SGPT) 43*   AST (SGOT) 22   BILIRUBIN 0.6   ALK PHOS 98                     Brief Urine Lab Results     None        Microbiology Results (last 10 days)     Procedure Component Value - Date/Time    Anaerobic Culture - Tissue, Toe, Right [[id number]]  (Normal) Collected: 01/06/23 1832    Lab Status: Final result Specimen: Tissue from Toe, Right Updated: 01/11/23 0754     Anaerobic Culture No anaerobes isolated at 5 days    Tissue / Bone Culture - Tissue, Toe, Right [[id number]]  (Abnormal) Collected: 01/06/23 1832    Lab Status: Final result Specimen: Tissue from Toe, Right Updated: 01/09/23 0914     Tissue Culture Rare Streptococcus mitis / oralis     Gram Stain No WBCs or organisms seen    Narrative:      Refer to culture collected 1/6/2023 1733 for MICs    Anaerobic Culture - Wound, Toe, Right [[id number]]  (Normal) Collected: 01/06/23 1733    Lab Status: Final result Specimen: Wound from Toe, Right Updated: 01/11/23 0754     Anaerobic Culture No anaerobes isolated at 5 days    Wound Culture - Wound, Toe, Right [[id number]]  (Abnormal)  (Susceptibility) Collected: 01/06/23 1733    Lab Status: Final result Specimen: Wound from Toe, Right Updated: 01/09/23 0827     Wound Culture Scant growth (1+) Streptococcus mitis / oralis     Gram Stain No WBCs or organisms seen    Susceptibility      Streptococcus mitis / oralis      VASYL      Penicillin G Susceptible      Vancomycin Susceptible                           MRSA Screen, PCR (Inpatient) - Swab, Nares [[id number]]  (Abnormal) Collected: 01/06/23 0939    Lab Status: Final result  Specimen: Swab from Nares Updated: 01/06/23 1407     MRSA PCR Positive    Narrative:      The negative predictive value of this diagnostic test is high and should only be used to consider de-escalating anti-MRSA therapy. A positive result may indicate colonization with MRSA and must be correlated clinically.    Wound Culture - Swab, Foot, Right [[id number]] Collected: 01/05/23 2201    Lab Status: Final result Specimen: Swab from Foot, Right Updated: 01/08/23 0730     Wound Culture Moderate growth (3+) Normal Skin Kayli     Gram Stain No WBCs or organisms seen    Blood Culture - Blood, Arm, Left [[id number]]  (Normal) Collected: 01/05/23 2105    Lab Status: Final result Specimen: Blood from Arm, Left Updated: 01/10/23 2131     Blood Culture No growth at 5 days    Blood Culture - Blood, Arm, Left [[id number]]  (Normal) Collected: 01/05/23 1758    Lab Status: Final result Specimen: Blood from Arm, Left Updated: 01/10/23 1815     Blood Culture No growth at 5 days          XR Foot 3+ View Right    Result Date: 1/5/2023  XR FOOT 3+ VW RIGHT Date of Exam: 1/5/2023 8:06 PM EST Indication: DM with foot ulcer; rule out gas in tissue. Comparison: None available. Findings: Postoperative changes are noted status post prior resection of the great toe at the level of the distal diaphysis of the proximal phalanx. There is soft tissue swelling within the residual soft tissues of the great toe extending into the soft tissues of the second through fifth digits and surrounding the forefoot. There is also evidence for edema extending towards the midfoot. The findings suggest changes of soft tissue cellulitis. There is suggestion of possible gas production in the soft tissues at the interdigital space between the first and second metatarsophalangeal joints and extending into the base of the residual stump of the first digit as well as the proximal aspect of the second digit. No definitive cortical erosion or destruction is seen.  There is  no definitive evidence for osteomyelitis. Changes of arthropathy are seen throughout the midfoot suggesting developing neuropathic arthropathy or Charcot foot.     Impression: 1.Soft tissue swelling throughout the forefoot and midfoot. The findings suggest changes of soft tissue cellulitis. 2.There is suggestion of gas production in the soft tissues of the residual first digit and extending towards the base of the second digit. 3.No definitive evidence for osteomyelitis. Electronically Signed: Laith Shahlett  1/5/2023 8:37 PM EST  Workstation ID: [id number]    MRI Foot Right With & Without Contrast    Result Date: 1/6/2023  MRI OF THE RIGHT FOOT WITH AND WITHOUT INTRAVENOUS CONTRAST HISTORY: Right forefoot pain. TECHNIQUE: Multiplanar and multisequence MR imaging of the right forefoot was performed without the administration of intravenous gadolinium. Imaging sequences include axial and sagittal T1, axial and sagittal proton density, coronal and sagittal fat-suppressed proton density, and axial fat-suppressed T2-weighted images. 20 mL of MultiHance was administered. COMPARISON: None. FINDINGS: There is a large amount of diffuse soft tissue swelling of the forefoot that could be a mixture of cellulitis and dependent edema. There is a wound/ulceration over the plantar aspect of the second metatarsal phalangeal joint with some necrosis and poor enhancement of the plantar soft tissues at this site. There is no abscess there is no soft tissue gas. No evidence of osteomyelitis. Partial amputation of the great toe.     1. No abscess. No osteomyelitis. 2. Plantar wound/ulceration over the second metatarsal phalangeal joint with some adjacent soft tissue necrosis an inflammatory phlegmon but no well-defined fluid collection. 3. Diffuse soft tissue swelling that could be a mixture of cellulitis and dependent edema. Electronically signed by:  Burke Evans M.D.  1/6/2023 2:11 AM Mountain Time                  Plan for  Follow-up of Pending Labs/Results: Follow-up as directed    Discharge Details        Discharge Medications      New Medications      Instructions Start Date   cefTRIAXone  Commonly known as: ROCEPHIN   2 g, Intravenous, Every 24 Hours   Start Date: January 15, 2023     saccharomyces boulardii 250 MG capsule  Commonly known as: FLORASTOR   250 mg, Oral, 2 Times Daily         Continue These Medications      Instructions Start Date   artificial tears ophthalmic ointment   Both Eyes, Nightly      aspirin 81 MG chewable tablet   81 mg, Oral, Daily      atorvastatin 10 MG tablet  Commonly known as: LIPITOR   10 mg, Oral, Daily      betamethasone (augmented) 0.05 % cream  Commonly known as: DIPROLENE   1 application, Topical, 2 Times Daily PRN      Biotin 89045 MCG tablet   Oral      calcium carbonate 600 MG tablet  Commonly known as: OS-SUSAN   600 mg, Oral, Daily      carboxymethylcellulose 0.5 % solution  Commonly known as: REFRESH PLUS   3 Times Daily PRN      Claritin 10 MG capsule  Generic drug: Loratadine   Oral      dexlansoprazole 60 MG capsule  Commonly known as: DEXILANT   60 mg, Oral, Daily      fluocinolone 0.01 % cream   1 application, Topical, 2 Times Daily      folic acid 0.5 MG half tablet  Commonly known as: FOLVITE   0.5 mg, Oral, Daily      hydroCHLOROthiazide 25 MG tablet  Commonly known as: HYDRODIURIL   50 mg, Oral, Daily      metFORMIN 500 MG tablet  Commonly known as: GLUCOPHAGE   500 mg, Oral, 2 Times Daily With Meals      Minoxidil 5 % solution   Apply externally      MOMETASONE FUROATE EX   Apply externally      nystatin 538631 UNIT/GM powder  Generic drug: nystatin   Topical, 4 Times Daily      potassium citrate 10 MEQ (1080 MG) CR tablet  Commonly known as: UROCIT-K   10 mEq, Oral, 3 Times Daily With Meals      selenium sulfide 2.5 % lotion  Commonly known as: SELSUN   Topical, Daily PRN      Semaglutide(0.25 or 0.5MG/DOS) 2 MG/1.5ML solution pen-injector  Commonly known as: OZEMPIC    Subcutaneous, Weekly      TRESIBA SC   Subcutaneous, 2 Times Daily, 60 units AM and 80 units PM      Turmeric 500 MG capsule   1,000 mg, Oral      vitamin C 250 MG tablet  Commonly known as: ASCORBIC ACID   500 mg, Oral, Daily      vitamin D 1.25 MG (25141 UT) capsule capsule  Commonly known as: ERGOCALCIFEROL   50,000 Units, Oral, Weekly      vitamin D3 125 MCG (5000 UT) capsule capsule   5,000 Units, Oral, Daily      Zinc 25 MG tablet   Oral             Allergies   Allergen Reactions   • Ciprofloxacin Provider Review Needed   • Clindamycin/Lincomycin Hives   • Doxycycline Provider Review Needed   • Nsaids Provider Review Needed   • Sulfa Antibiotics Provider Review Needed   • Tetracyclines & Related Provider Review Needed         Discharge Disposition:  Rehab Facility or Unit (DC - External)    Diet:  Hospital:  Diet Order   Procedures   • Diet: Regular/House Diet, Diabetic Diets; Consistent Carbohydrate; Texture: Regular Texture (IDDSI 7); Fluid Consistency: Thin (IDDSI 0)       Activity:  NWB LLE          CODE STATUS:    Code Status and Medical Interventions:   Ordered at: 01/05/23 0692     Level Of Support Discussed With:    Patient     Code Status (Patient has no pulse and is not breathing):    CPR (Attempt to Resuscitate)     Medical Interventions (Patient has pulse or is breathing):    Full Support       No future appointments.    Additional Instructions for the Follow-ups that You Need to Schedule     Discharge Follow-up with PCP   As directed       Currently Documented PCP:    Emanuel Fuentes DPM    PCP Phone Number:    [phone]     Follow Up Details: Follow up with PCP 1 week after discharge from rehab.         Discharge Follow-up with Specified Provider: Follow up with Dr. Raygoza in 1-2 weeks.   As directed      To: Follow up with Dr. Raygoza in 1-2 weeks.                     JOEY Butler  01/14/23      Time Spent on Discharge:  I spent  37  minutes on this discharge activity which included:  face-to-face encounter with the patient, reviewing the data in the system, coordination of the care with the nursing staff as well as consultants, documentation, and entering orders.          Electronically signed by Winter Jeong II, DO at 01/14/23 3205

## 2023-01-20 NOTE — PROGRESS NOTES
"Enter Query Response Below      Query Response:     I agree with the pathological diagnoses  Electronically signed by Javon Raygoza MD, 23, 5:42 AM EST.           If applicable, please update the problem list.        Patient: Maura Leon        : 1960  Account: [id number]           Admit Date: 2023        How to Respond to this query:       a. Click New Note     b. Answer query within the yellow box.                c. Update the Problem List, if applicable.      If you have any questions about this query contact me at: [email or web address]     Dr. Raygoza,    Based on Medicare billing guidelines [facility] hospitals are not allowed to use diagnoses from pathology reports as the sole basis for billing. Any findings noted on a pathology report must be affirmed by the treating physician before billing.    The pathologist reported that the specimen shows:    \"Final Diagnosis -- -- --  [facility]  Result:   1.  SKIN FROM PLANTAR SURFACE RIGHT SECOND TOE, EXCISION:                 Ulceration with underlying necrosis and severe acute cellulitis                 Negative for neoplasm    2.  RIGHT SECOND TOE, AMPUTATION:                 Bone with focal necrosis and inflammation compatible with osteomyelitis                 Skin, soft tissue, and bone from margin of amputation without significant acute inflammation    3.  DISTAL THIRD OF SECOND METATARSAL, EXCISION:                 Representative bone without evidence of inflammation    4.  SOFT TISSUE FROM RIGHT GREAT TOE, DEBRIDEMENT:                 Acute cellulitis with necrosis within the deep soft tissue\"    Is this finding consistent with your clinical impression and treatment plan for this patient?    - Yes  - No  - Other explanation for clinical impression and treatment plan_________  - Clinically indeterminable    By submitting this query, we are merely seeking further clarification of documentation to accurately reflect all conditions that " you are monitoring, evaluating, treating or that extend the hospitalization or utilize additional resources of care. Please utilize your independent clinical judgment when addressing the question(s) above.     This query and your response, once completed, will be entered into the legal medical record.    Sincerely,  Kaylin Calderon RN [facility]  Clinical Documentation Integrity Program

## 2023-01-27 ENCOUNTER — TELEPHONE (OUTPATIENT)
Dept: CARDIAC SURGERY | Facility: CLINIC | Age: 63
End: 2023-01-27
Payer: COMMERCIAL

## 2023-01-27 NOTE — TELEPHONE ENCOUNTER
[facility] from [facility].    She does still have wound vac in place.  Nurse feels she is progressing as expected but is unsure of drainage amount from vac at this time.    Patient has appt with Dr. Raygoza 3/1/23.  Encouraged that we would follow patient and continue her care upon discharge and she should call our office with any concerns.    At this point awaiting pts discharge to see if she needs sooner appt or different care management.

## 2023-02-22 ENCOUNTER — TELEPHONE (OUTPATIENT)
Dept: CARDIAC SURGERY | Facility: CLINIC | Age: 63
End: 2023-02-22
Payer: COMMERCIAL

## 2023-02-22 NOTE — TELEPHONE ENCOUNTER
Provider: MADELINE  Caller: CASA NGUYỄN  Relationship to Patient: SELF  Phone Number: [phone]  Reason for Call:   PT WANTING MADELINE TO KNOW SHE WONT BE CONTINUING CARE DUE TO DISTANCE    PT WILL BE CONTINUING CARE WITH SCOTT MENDOZA [facility]

## 2023-06-09 ENCOUNTER — TELEPHONE (OUTPATIENT)
Dept: CARDIAC SURGERY | Facility: CLINIC | Age: 63
End: 2023-06-09
Payer: COMMERCIAL

## 2023-06-09 NOTE — TELEPHONE ENCOUNTER
Reyes Loera, with [facility], called about some orders for patient. However, upon further review patient has not been seen by Dr. Raygoza since she was in the hospital in January 2023 and there is also a telephone encounter on file stating that she will not be continuing care with Dr. Raygoza due to the distance and will instead be seeing Dr. Mian Chappell at [facility]. I made Reyes aware of this and let him know that Dr. Raygoza cannot continue signing orders for the patient. He verbalized understanding, agreed, and stated that he would call Dr. Chappell.

## (undated) DEVICE — GLV SURG PREMIERPRO MIC LTX PF SZ7 BRN

## (undated) DEVICE — SPNG GZ WOVN 4X4IN 12PLY 10/BX STRL

## (undated) DEVICE — 450 ML BOTTLE OF 0.05% CHLORHEXIDINE GLUCONATE IN 99.95% STERILE WATER FOR IRRIGATION, USP AND APPLICATOR.: Brand: IRRISEPT ANTIMICROBIAL WOUND LAVAGE

## (undated) DEVICE — BANDAGE,GAUZE,BULKEE II,4.5"X4.1YD,STRL: Brand: MEDLINE

## (undated) DEVICE — 3M™ STERI-DRAPE™ ISOLATION BAG, 10 PER CARTON / 4 CARTONS PER CASE, 1003: Brand: 3M™ STERI-DRAPE™

## (undated) DEVICE — TBG PENCL TELESCP MEGADYNE SMOKE EVAC 10FT

## (undated) DEVICE — ELECTRD BLD EZ CLN MOD XLNG 2.75IN

## (undated) DEVICE — DRSNG GZ PETROLTM XEROFORM CURAD 4X4IN STRL

## (undated) DEVICE — PATIENT RETURN ELECTRODE, SINGLE-USE, CONTACT QUALITY MONITORING, ADULT, WITH 9FT CORD, FOR PATIENTS WEIGING OVER 33LBS. (15KG): Brand: MEGADYNE

## (undated) DEVICE — PK EXTREM LOWR 10

## (undated) DEVICE — DRAPE,TOP,102X53,STERILE: Brand: MEDLINE

## (undated) DEVICE — SUT ETHLN 2/0 PS 18IN 585H

## (undated) DEVICE — SUT ETHLN 3/0 FS1 30IN 669H

## (undated) DEVICE — ANTIBACTERIAL UNDYED BRAIDED (POLYGLACTIN 910), SYNTHETIC ABSORBABLE SUTURE: Brand: COATED VICRYL

## (undated) DEVICE — CONTAINER,SPECIMEN,OR STERILE,4OZ: Brand: MEDLINE

## (undated) DEVICE — TRAP FLD MINIVAC MEGADYNE 100ML